# Patient Record
Sex: FEMALE | Race: BLACK OR AFRICAN AMERICAN | Employment: UNEMPLOYED | URBAN - METROPOLITAN AREA
[De-identification: names, ages, dates, MRNs, and addresses within clinical notes are randomized per-mention and may not be internally consistent; named-entity substitution may affect disease eponyms.]

---

## 2022-08-02 ENCOUNTER — TELEPHONE (OUTPATIENT)
Dept: ORTHOPEDICS | Facility: CLINIC | Age: 42
End: 2022-08-02
Payer: MEDICAID

## 2022-08-02 NOTE — TELEPHONE ENCOUNTER
Attempted to call pt x2 and each time a fax machine answered. Sent fax requesting call back with direct number. Fax attempt was abandoned. Unable to obtain records prior to visit with Dr Scott.

## 2022-08-03 ENCOUNTER — HOSPITAL ENCOUNTER (OUTPATIENT)
Dept: RADIOLOGY | Facility: HOSPITAL | Age: 42
Discharge: HOME | End: 2022-08-03
Attending: ORTHOPAEDIC SURGERY
Payer: MEDICAID

## 2022-08-03 ENCOUNTER — OFFICE VISIT (OUTPATIENT)
Dept: ORTHOPEDICS | Facility: CLINIC | Age: 42
End: 2022-08-03
Payer: MEDICAID

## 2022-08-03 VITALS — WEIGHT: 177.13 LBS | BODY MASS INDEX: 33.44 KG/M2 | HEIGHT: 61 IN

## 2022-08-03 DIAGNOSIS — M62.838 MUSCLE SPASMS OF BOTH LOWER EXTREMITIES: ICD-10-CM

## 2022-08-03 DIAGNOSIS — M25.572 BILATERAL ANKLE PAIN, UNSPECIFIED CHRONICITY: Primary | ICD-10-CM

## 2022-08-03 DIAGNOSIS — M25.571 BILATERAL ANKLE PAIN, UNSPECIFIED CHRONICITY: Primary | ICD-10-CM

## 2022-08-03 DIAGNOSIS — M79.672 BILATERAL FOOT PAIN: Primary | ICD-10-CM

## 2022-08-03 DIAGNOSIS — M25.571 BILATERAL ANKLE PAIN, UNSPECIFIED CHRONICITY: ICD-10-CM

## 2022-08-03 DIAGNOSIS — M79.672 BILATERAL FOOT PAIN: ICD-10-CM

## 2022-08-03 DIAGNOSIS — M79.671 BILATERAL FOOT PAIN: Primary | ICD-10-CM

## 2022-08-03 DIAGNOSIS — G57.93 NEUROPATHIC PAIN OF BOTH LEGS: ICD-10-CM

## 2022-08-03 DIAGNOSIS — M79.671 BILATERAL FOOT PAIN: ICD-10-CM

## 2022-08-03 DIAGNOSIS — M25.572 BILATERAL ANKLE PAIN, UNSPECIFIED CHRONICITY: ICD-10-CM

## 2022-08-03 PROCEDURE — 1160F RVW MEDS BY RX/DR IN RCRD: CPT | Mod: CPTII,,, | Performed by: ORTHOPAEDIC SURGERY

## 2022-08-03 PROCEDURE — 1160F PR REVIEW ALL MEDS BY PRESCRIBER/CLIN PHARMACIST DOCUMENTED: ICD-10-PCS | Mod: CPTII,,, | Performed by: ORTHOPAEDIC SURGERY

## 2022-08-03 PROCEDURE — 99999 PR PBB SHADOW E&M-EST. PATIENT-LVL III: ICD-10-PCS | Mod: PBBFAC,,, | Performed by: ORTHOPAEDIC SURGERY

## 2022-08-03 PROCEDURE — 73610 XR ANKLE COMPLETE 3 VIEW BILATERAL: ICD-10-PCS | Mod: 26,RT,, | Performed by: RADIOLOGY

## 2022-08-03 PROCEDURE — 73630 X-RAY EXAM OF FOOT: CPT | Mod: 26,LT,, | Performed by: RADIOLOGY

## 2022-08-03 PROCEDURE — 1159F PR MEDICATION LIST DOCUMENTED IN MEDICAL RECORD: ICD-10-PCS | Mod: CPTII,,, | Performed by: ORTHOPAEDIC SURGERY

## 2022-08-03 PROCEDURE — 3008F PR BODY MASS INDEX (BMI) DOCUMENTED: ICD-10-PCS | Mod: CPTII,,, | Performed by: ORTHOPAEDIC SURGERY

## 2022-08-03 PROCEDURE — 73630 X-RAY EXAM OF FOOT: CPT | Mod: TC,50

## 2022-08-03 PROCEDURE — 99203 OFFICE O/P NEW LOW 30 MIN: CPT | Mod: S$PBB,,, | Performed by: ORTHOPAEDIC SURGERY

## 2022-08-03 PROCEDURE — 99203 PR OFFICE/OUTPT VISIT, NEW, LEVL III, 30-44 MIN: ICD-10-PCS | Mod: S$PBB,,, | Performed by: ORTHOPAEDIC SURGERY

## 2022-08-03 PROCEDURE — 99213 OFFICE O/P EST LOW 20 MIN: CPT | Mod: PBBFAC | Performed by: ORTHOPAEDIC SURGERY

## 2022-08-03 PROCEDURE — 73610 X-RAY EXAM OF ANKLE: CPT | Mod: 26,RT,, | Performed by: RADIOLOGY

## 2022-08-03 PROCEDURE — 1159F MED LIST DOCD IN RCRD: CPT | Mod: CPTII,,, | Performed by: ORTHOPAEDIC SURGERY

## 2022-08-03 PROCEDURE — 3008F BODY MASS INDEX DOCD: CPT | Mod: CPTII,,, | Performed by: ORTHOPAEDIC SURGERY

## 2022-08-03 PROCEDURE — 73610 X-RAY EXAM OF ANKLE: CPT | Mod: TC,50

## 2022-08-03 PROCEDURE — 99999 PR PBB SHADOW E&M-EST. PATIENT-LVL III: CPT | Mod: PBBFAC,,, | Performed by: ORTHOPAEDIC SURGERY

## 2022-08-03 PROCEDURE — 73630 X-RAY EXAM OF FOOT: CPT | Mod: 26,RT,, | Performed by: RADIOLOGY

## 2022-08-03 PROCEDURE — 73610 X-RAY EXAM OF ANKLE: CPT | Mod: 26,LT,, | Performed by: RADIOLOGY

## 2022-08-03 PROCEDURE — 73630 PR  X-RAY FOOT 3+ VW: ICD-10-PCS | Mod: 26,LT,, | Performed by: RADIOLOGY

## 2022-08-03 NOTE — PROGRESS NOTES
"[facility]    SUBJECTIVE:     Chief Complaint: Bilateral foot pain     History of Present Illness:  Patient is a 42 y.o. female with a history of bilateral bunionectomies, who presents with bilateral foot pain and associated spasms.  Reports having these symptoms since she was around 20 y.o.  She describes pain that begins as pins/needles and progresses to tingling with burning pain.  The pain goes from the dorsum of her proximal foot up to her anterior knee.  Her pain is exacerbated by standing or walking long distances and has been progressively worsening over the past few years.  Associated with this pain are spasms that she describes as "locking up," which have also become more frequent during the past few years.  Also experiences similar spasms in her hands and arms bilaterally.  Certain movements will trigger these spasms, such as writing or stretching her feet at the end of a long workday, but they also sometimes occur randomly at night while she's sleeping.  Says her most recent episode was last weekend, and they occur sporadically with days or weeks in between episodes.  Reports also having lower back pain for years but believes it is due to multiple pregnancies and working on her feet all day.  She is able to ambulate unassisted, but feels her pain is limiting her ability to do daily tasks, such as cleaning her house, because she needs frequent breaks to alleviate her pain.  Denies taking NSAIDs or using inserts.         Review of patient's allergies indicates:  No Known Allergies    History reviewed. No pertinent past medical history.  History reviewed. No pertinent surgical history.  History reviewed. No pertinent family history.  Social History     Tobacco Use    Smoking status: Never Smoker    Smokeless tobacco: Never Used        Review of Systems:  Constitution: Negative for chills, fever  HENT: Negative for congestion  Cardiovascular: Negative for chest pain, " "palpitations  Respiratory: Negative for cough, shortness of breath  Hematologic/Lymphatic: Negative for easy bruising/bleeding  Skin: Negative for rash, suspicious lesions  Gastrointestinal: Negative for nausea, vomiting, bowel incontinence  Genitourinary: Negative for bladder incontinence  Neurological: Negative for numbness, paresthesias, sensory change  Musculoskeletal: see HPI      OBJECTIVE:     Vital Signs:  Ht 5' 1" (1.549 m)   Wt 80.3 kg (177 lb 2.2 oz)   BMI 33.47 kg/m²     Physical Exam:  General:  NAD, WDWN, Mood is pleasant  HENT:  NCAT, Bilateral ears/eyes normal  CV:  Normal pulses, color, and cap refill  Lungs:  Normal respiratory effort  Psych:  Alert and oriented x 3    MSK: Bilateral Foot and Ankle Exam:  Inspection: No swelling or ecchymosis present.  Standing examination demonstrates Valgus hindfoot/forefoot alignment. Medial longitudinal arch is pes planus, slightly pronated and symmetric.   Positive [id number]-[id number] sign symmetric.  Semi-flexible crossover toe present.   Palpation: No TTP in any specific area. Hindfoot is flexible.    ROM: Ankle ROM is normal; df15 deg, pf35 deg. and equal bilaterally   Neurovascular: Able to perform double limb heel rise. Unable to perform single limb heel rise.   4/5 PTT with pain. Otherwise fires TA/GSC/peroneals.  SILT SP/DP/PT and able to localize. Palpable DP and PT pulses.        XRAYS:  Standing 3v bilateral foot demonstrate    - Bilateral hallux valgus   - Bilateral pes planus   - No evidence of any fracture or dislocation.    - The osseous structures appear well mineralized and well aligned.   - No mortise displacement.    All other pertinent labs and imaging were reviewed.      ASSESSMENT:     Janay Mathis is a 42 y.o. old female with with a history of bilateral bunionectomies, who presents with bilateral foot pain and associated spasms.    1. Bilateral foot pain    2. Muscle spasms of both lower extremities    3. Neuropathic pain of both legs  "     .  PLAN:     - Seen and examined with Dr. Scott   Upper and lower extremity spasms concerning for Upper Motor Neuron pathology   Referring to neurology for continued evaluation   Follow up as needed       Signed:  SD Santana M.D.   Orthopaedic Surgery, PGY1    I have personally taken the history and examined this patient and agree with the residents note as stated above.  While this patient does have flat feet and recurrent bunions, I do not believe problems are the source of her symptoms.  I would recommend a Neurology, and or spine clinic evaluation to rule out other causes of her muscle spasms and other neuropathic symptoms

## 2022-08-08 ENCOUNTER — HOSPITAL ENCOUNTER (OUTPATIENT)
Facility: HOSPITAL | Age: 42
Discharge: LEFT BEFORE DISCHARGE | End: 2022-08-09
Attending: EMERGENCY MEDICINE | Admitting: EMERGENCY MEDICINE
Payer: MEDICAID

## 2022-08-08 DIAGNOSIS — D64.9 ANEMIA, UNSPECIFIED TYPE: Primary | ICD-10-CM

## 2022-08-08 DIAGNOSIS — R07.9 CHEST PAIN: ICD-10-CM

## 2022-08-08 PROBLEM — D50.9 MICROCYTIC ANEMIA: Status: ACTIVE | Noted: 2022-08-08

## 2022-08-08 PROBLEM — F31.9 BIPOLAR AFFECTIVE DISORDER, CURRENTLY ACTIVE: Status: ACTIVE | Noted: 2022-08-08

## 2022-08-08 PROBLEM — D62 ACUTE BLOOD LOSS ANEMIA: Status: ACTIVE | Noted: 2022-08-08

## 2022-08-08 LAB
ABO + RH BLD: NORMAL
ALBUMIN SERPL BCP-MCNC: 3.8 G/DL (ref 3.5–5.2)
ALP SERPL-CCNC: 85 U/L (ref 55–135)
ALT SERPL W/O P-5'-P-CCNC: 23 U/L (ref 10–44)
ANION GAP SERPL CALC-SCNC: 6 MMOL/L (ref 8–16)
AST SERPL-CCNC: 19 U/L (ref 10–40)
B-HCG UR QL: NEGATIVE
BASOPHILS # BLD AUTO: 0.02 K/UL (ref 0–0.2)
BASOPHILS NFR BLD: 0.3 % (ref 0–1.9)
BILIRUB SERPL-MCNC: 0.5 MG/DL (ref 0.1–1)
BLD GP AB SCN CELLS X3 SERPL QL: NORMAL
BLD PROD TYP BPU: NORMAL
BLOOD UNIT EXPIRATION DATE: NORMAL
BLOOD UNIT TYPE CODE: 5100
BLOOD UNIT TYPE: NORMAL
BUN SERPL-MCNC: 7 MG/DL (ref 6–20)
CALCIUM SERPL-MCNC: 8.7 MG/DL (ref 8.7–10.5)
CHLORIDE SERPL-SCNC: 106 MMOL/L (ref 95–110)
CO2 SERPL-SCNC: 25 MMOL/L (ref 23–29)
CODING SYSTEM: NORMAL
CREAT SERPL-MCNC: 0.7 MG/DL (ref 0.5–1.4)
CTP QC/QA: YES
DIFFERENTIAL METHOD: ABNORMAL
DISPENSE STATUS: NORMAL
EOSINOPHIL # BLD AUTO: 0 K/UL (ref 0–0.5)
EOSINOPHIL NFR BLD: 0.3 % (ref 0–8)
ERYTHROCYTE [DISTWIDTH] IN BLOOD BY AUTOMATED COUNT: 20.9 % (ref 11.5–14.5)
EST. GFR  (NO RACE VARIABLE): >60 ML/MIN/1.73 M^2
FERRITIN SERPL-MCNC: 5 NG/ML (ref 20–300)
GLUCOSE SERPL-MCNC: 87 MG/DL (ref 70–110)
HCT VFR BLD AUTO: 24.3 % (ref 37–48.5)
HGB BLD-MCNC: 6.2 G/DL (ref 12–16)
IMM GRANULOCYTES # BLD AUTO: 0.02 K/UL (ref 0–0.04)
IMM GRANULOCYTES NFR BLD AUTO: 0.3 % (ref 0–0.5)
INR PPP: 1 (ref 0.8–1.2)
IRON SERPL-MCNC: 11 UG/DL (ref 30–160)
LYMPHOCYTES # BLD AUTO: 1.6 K/UL (ref 1–4.8)
LYMPHOCYTES NFR BLD: 20.5 % (ref 18–48)
MCH RBC QN AUTO: 18 PG (ref 27–31)
MCHC RBC AUTO-ENTMCNC: 25.5 G/DL (ref 32–36)
MCV RBC AUTO: 70 FL (ref 82–98)
MONOCYTES # BLD AUTO: 0.6 K/UL (ref 0.3–1)
MONOCYTES NFR BLD: 8.1 % (ref 4–15)
NEUTROPHILS # BLD AUTO: 5.5 K/UL (ref 1.8–7.7)
NEUTROPHILS NFR BLD: 70.5 % (ref 38–73)
NRBC BLD-RTO: 0 /100 WBC
PLATELET # BLD AUTO: 260 K/UL (ref 150–450)
PMV BLD AUTO: 11 FL (ref 9.2–12.9)
POTASSIUM SERPL-SCNC: 3.6 MMOL/L (ref 3.5–5.1)
PROT SERPL-MCNC: 6.9 G/DL (ref 6–8.4)
PROTHROMBIN TIME: 10.4 SEC (ref 9–12.5)
RBC # BLD AUTO: 3.45 M/UL (ref 4–5.4)
SARS-COV-2 RDRP RESP QL NAA+PROBE: NEGATIVE
SATURATED IRON: 2 % (ref 20–50)
SODIUM SERPL-SCNC: 137 MMOL/L (ref 136–145)
TOTAL IRON BINDING CAPACITY: 558 UG/DL (ref 250–450)
TRANS ERYTHROCYTES VOL PATIENT: NORMAL ML
TRANSFERRIN SERPL-MCNC: 377 MG/DL (ref 200–375)
WBC # BLD AUTO: 7.79 K/UL (ref 3.9–12.7)

## 2022-08-08 PROCEDURE — 85610 PROTHROMBIN TIME: CPT | Performed by: STUDENT IN AN ORGANIZED HEALTH CARE EDUCATION/TRAINING PROGRAM

## 2022-08-08 PROCEDURE — P9021 RED BLOOD CELLS UNIT: HCPCS | Performed by: EMERGENCY MEDICINE

## 2022-08-08 PROCEDURE — 87389 HIV-1 AG W/HIV-1&-2 AB AG IA: CPT | Performed by: PHYSICIAN ASSISTANT

## 2022-08-08 PROCEDURE — 82728 ASSAY OF FERRITIN: CPT | Performed by: EMERGENCY MEDICINE

## 2022-08-08 PROCEDURE — 85025 COMPLETE CBC W/AUTO DIFF WBC: CPT | Performed by: EMERGENCY MEDICINE

## 2022-08-08 PROCEDURE — 99220 PR INITIAL OBSERVATION CARE,LEVL III: ICD-10-PCS | Mod: ,,, | Performed by: STUDENT IN AN ORGANIZED HEALTH CARE EDUCATION/TRAINING PROGRAM

## 2022-08-08 PROCEDURE — 25000242 PHARM REV CODE 250 ALT 637 W/ HCPCS: Performed by: STUDENT IN AN ORGANIZED HEALTH CARE EDUCATION/TRAINING PROGRAM

## 2022-08-08 PROCEDURE — 99284 EMERGENCY DEPT VISIT MOD MDM: CPT | Mod: ,,, | Performed by: EMERGENCY MEDICINE

## 2022-08-08 PROCEDURE — 36430 TRANSFUSION BLD/BLD COMPNT: CPT

## 2022-08-08 PROCEDURE — 99284 PR EMERGENCY DEPT VISIT,LEVEL IV: ICD-10-PCS | Mod: ,,, | Performed by: EMERGENCY MEDICINE

## 2022-08-08 PROCEDURE — 86920 COMPATIBILITY TEST SPIN: CPT | Performed by: EMERGENCY MEDICINE

## 2022-08-08 PROCEDURE — 99220 PR INITIAL OBSERVATION CARE,LEVL III: CPT | Mod: ,,, | Performed by: STUDENT IN AN ORGANIZED HEALTH CARE EDUCATION/TRAINING PROGRAM

## 2022-08-08 PROCEDURE — G0378 HOSPITAL OBSERVATION PER HR: HCPCS

## 2022-08-08 PROCEDURE — U0002 COVID-19 LAB TEST NON-CDC: HCPCS | Performed by: EMERGENCY MEDICINE

## 2022-08-08 PROCEDURE — 99285 EMERGENCY DEPT VISIT HI MDM: CPT | Mod: 25

## 2022-08-08 PROCEDURE — 80053 COMPREHEN METABOLIC PANEL: CPT | Performed by: EMERGENCY MEDICINE

## 2022-08-08 PROCEDURE — 86803 HEPATITIS C AB TEST: CPT | Performed by: PHYSICIAN ASSISTANT

## 2022-08-08 PROCEDURE — 81025 URINE PREGNANCY TEST: CPT | Performed by: EMERGENCY MEDICINE

## 2022-08-08 PROCEDURE — 84466 ASSAY OF TRANSFERRIN: CPT | Performed by: EMERGENCY MEDICINE

## 2022-08-08 PROCEDURE — 86850 RBC ANTIBODY SCREEN: CPT | Performed by: EMERGENCY MEDICINE

## 2022-08-08 RX ORDER — TALC
6 POWDER (GRAM) TOPICAL NIGHTLY PRN
Status: DISCONTINUED | OUTPATIENT
Start: 2022-08-08 | End: 2022-08-08

## 2022-08-08 RX ORDER — PRAZOSIN HYDROCHLORIDE 5 MG/1
5 CAPSULE ORAL NIGHTLY
Status: DISCONTINUED | OUTPATIENT
Start: 2022-08-08 | End: 2022-08-09 | Stop reason: HOSPADM

## 2022-08-08 RX ORDER — PROCHLORPERAZINE EDISYLATE 5 MG/ML
5 INJECTION INTRAMUSCULAR; INTRAVENOUS EVERY 6 HOURS PRN
Status: DISCONTINUED | OUTPATIENT
Start: 2022-08-08 | End: 2022-08-09 | Stop reason: HOSPADM

## 2022-08-08 RX ORDER — IBUPROFEN 200 MG
16 TABLET ORAL
Status: DISCONTINUED | OUTPATIENT
Start: 2022-08-08 | End: 2022-08-09 | Stop reason: HOSPADM

## 2022-08-08 RX ORDER — IPRATROPIUM BROMIDE AND ALBUTEROL SULFATE 2.5; .5 MG/3ML; MG/3ML
3 SOLUTION RESPIRATORY (INHALATION) EVERY 6 HOURS PRN
Status: DISCONTINUED | OUTPATIENT
Start: 2022-08-08 | End: 2022-08-09 | Stop reason: HOSPADM

## 2022-08-08 RX ORDER — ONDANSETRON 2 MG/ML
4 INJECTION INTRAMUSCULAR; INTRAVENOUS EVERY 8 HOURS PRN
Status: DISCONTINUED | OUTPATIENT
Start: 2022-08-08 | End: 2022-08-09 | Stop reason: HOSPADM

## 2022-08-08 RX ORDER — IBUPROFEN 200 MG
24 TABLET ORAL
Status: DISCONTINUED | OUTPATIENT
Start: 2022-08-08 | End: 2022-08-09 | Stop reason: HOSPADM

## 2022-08-08 RX ORDER — SODIUM CHLORIDE 0.9 % (FLUSH) 0.9 %
10 SYRINGE (ML) INJECTION
Status: DISCONTINUED | OUTPATIENT
Start: 2022-08-08 | End: 2022-08-09 | Stop reason: HOSPADM

## 2022-08-08 RX ORDER — FLUOXETINE 10 MG/1
10 CAPSULE ORAL DAILY
Status: DISCONTINUED | OUTPATIENT
Start: 2022-08-09 | End: 2022-08-09 | Stop reason: HOSPADM

## 2022-08-08 RX ORDER — SIMETHICONE 80 MG
1 TABLET,CHEWABLE ORAL 4 TIMES DAILY PRN
Status: DISCONTINUED | OUTPATIENT
Start: 2022-08-08 | End: 2022-08-09 | Stop reason: HOSPADM

## 2022-08-08 RX ORDER — AMOXICILLIN 250 MG
1 CAPSULE ORAL 2 TIMES DAILY
Status: DISCONTINUED | OUTPATIENT
Start: 2022-08-08 | End: 2022-08-09 | Stop reason: HOSPADM

## 2022-08-08 RX ORDER — MAG HYDROX/ALUMINUM HYD/SIMETH 200-200-20
30 SUSPENSION, ORAL (FINAL DOSE FORM) ORAL 4 TIMES DAILY PRN
Status: DISCONTINUED | OUTPATIENT
Start: 2022-08-08 | End: 2022-08-09 | Stop reason: HOSPADM

## 2022-08-08 RX ORDER — NALOXONE HCL 0.4 MG/ML
0.02 VIAL (ML) INJECTION
Status: DISCONTINUED | OUTPATIENT
Start: 2022-08-08 | End: 2022-08-09 | Stop reason: HOSPADM

## 2022-08-08 RX ORDER — HYDROCODONE BITARTRATE AND ACETAMINOPHEN 500; 5 MG/1; MG/1
TABLET ORAL
Status: DISCONTINUED | OUTPATIENT
Start: 2022-08-08 | End: 2022-08-09 | Stop reason: HOSPADM

## 2022-08-08 RX ORDER — SODIUM CHLORIDE 0.9 % (FLUSH) 0.9 %
10 SYRINGE (ML) INJECTION EVERY 8 HOURS PRN
Status: DISCONTINUED | OUTPATIENT
Start: 2022-08-08 | End: 2022-08-09 | Stop reason: HOSPADM

## 2022-08-08 RX ORDER — TALC
6 POWDER (GRAM) TOPICAL NIGHTLY PRN
Status: DISCONTINUED | OUTPATIENT
Start: 2022-08-08 | End: 2022-08-09 | Stop reason: HOSPADM

## 2022-08-08 RX ORDER — LANOLIN ALCOHOL/MO/W.PET/CERES
1 CREAM (GRAM) TOPICAL DAILY
Status: DISCONTINUED | OUTPATIENT
Start: 2022-08-09 | End: 2022-08-09 | Stop reason: HOSPADM

## 2022-08-08 RX ORDER — GLUCAGON 1 MG
1 KIT INJECTION
Status: DISCONTINUED | OUTPATIENT
Start: 2022-08-08 | End: 2022-08-09 | Stop reason: HOSPADM

## 2022-08-08 RX ORDER — ACETAMINOPHEN 325 MG/1
650 TABLET ORAL EVERY 8 HOURS PRN
Status: DISCONTINUED | OUTPATIENT
Start: 2022-08-08 | End: 2022-08-09 | Stop reason: HOSPADM

## 2022-08-08 RX ADMIN — PRAZOSIN HYDROCHLORIDE 5 MG: 5 CAPSULE ORAL at 11:08

## 2022-08-08 NOTE — ED PROVIDER NOTES
Encounter Date: 8/8/2022    SCRIBE #1 NOTE: I, Kelli Fernandez, am scribing for, and in the presence of,  Martha Sexton MD. I have scribed the following portions of the note - Other sections scribed: HPI, ROS, PE.       History     Chief Complaint   Patient presents with    Rectal Bleeding     Been having rectal bleeding but has increased     Time patient was seen by the provider: 3:52 PM      The patient is a 42 y.o. female with past medical history of rectal bleeding who presents to the ED with a complaint of rectal bleeding every day for years. She reports associated abdominal cramping and bloody diarrhea.    The history is provided by the patient and medical records. No  was used.     Review of patient's allergies indicates:  No Known Allergies  History reviewed. No pertinent past medical history.  History reviewed. No pertinent surgical history.  History reviewed. No pertinent family history.  Social History     Tobacco Use    Smoking status: Never Smoker    Smokeless tobacco: Never Used     Review of Systems   Gastrointestinal: Positive for abdominal pain, anal bleeding, blood in stool and diarrhea.       Physical Exam     Initial Vitals [08/08/22 1421]   BP Pulse Resp Temp SpO2   133/83 94 18 98.4 °F (36.9 °C) 100 %      MAP       --         Physical Exam    Nursing note and vitals reviewed.  Constitutional: Vital signs are normal. She appears well-developed and well-nourished.  Non-toxic appearance. She does not appear ill.   HENT:   Head: Normocephalic and atraumatic.   Mouth/Throat: Mucous membranes are normal. Mucous membranes are not dry.   Eyes: Conjunctivae and lids are normal.   No conjunctival pallor   Neck: Neck supple.   Normal range of motion.  Cardiovascular: Normal rate.   Abdominal: Abdomen is soft. There is no abdominal tenderness.   Genitourinary: Rectum:      Guaiac result negative.   Guaiac negative stool.    Genitourinary Comments: External non-bleeding  hemorrhoids      Musculoskeletal:      Cervical back: Normal range of motion and neck supple.     Neurological: She is alert and oriented to person, place, and time.   Skin: Skin is dry and intact. No pallor.   Psychiatric: She has a normal mood and affect. Her speech is normal and behavior is normal.         ED Course   Procedures  Labs Reviewed   CBC W/ AUTO DIFFERENTIAL - Abnormal; Notable for the following components:       Result Value    RBC 3.45 (*)     Hemoglobin 6.2 (*)     Hematocrit 24.3 (*)     MCV 70 (*)     MCH 18.0 (*)     MCHC 25.5 (*)     RDW 20.9 (*)     All other components within normal limits   COMPREHENSIVE METABOLIC PANEL - Abnormal; Notable for the following components:    Anion Gap 6 (*)     All other components within normal limits   FERRITIN - Abnormal; Notable for the following components:    Ferritin 5 (*)     All other components within normal limits    Narrative:     ADD ON IRONP SAMUEL ORD#[id number] PER MD BUCKLEY 08/08/22 @1726   IRON AND TIBC - Abnormal; Notable for the following components:    Iron 11 (*)     Transferrin 377 (*)     TIBC 558 (*)     Saturated Iron 2 (*)     All other components within normal limits    Narrative:     ADD ON IRONP SAMUEL ORD#[id number] PER MD BUCKLEY 08/08/22 @1726   SARS-COV-2 RNA AMPLIFICATION, QUAL   IRON AND TIBC   FERRITIN   PROTIME-INR   HIV 1 / 2 ANTIBODY   HEPATITIS C ANTIBODY   POCT URINE PREGNANCY   TYPE & SCREEN   PREPARE RBC SOFT          Imaging Results    None          Medications - No data to display  Medical Decision Making:   History:   Old Medical Records: I decided to obtain old medical records.  Initial Assessment:   Complicated past medical history, on on review of patient's EMR from [facility] it seems that she has recurrent admissions therefore severe anemia in the setting of heavy vaginal bleeding.  She has also noted hematochezia in that setting as well though it seems that OB and Medicine all feel that her severe anemia is secondary to  dysfunctional uterine bleeding.    On her exam today her vital signs are stable, she is not appear pale and her abdominal exam is benign and her rectal exam is negative for blood and guaiac negative.  Differential Diagnosis:   Possible lower GI bleed?  If present, patient reports that these symptoms have been present for years which would raise concern for something like inflammatory bowel disease or hemorrhoids.    General pelvic bleeding that is vaginal/uterine in origin?  Clinical Tests:   Lab Tests: Ordered and Reviewed  ED Management:  Will check labs given patient's history of severe anemia  If lab stable, patient is stable and could be further worked up for these symptoms and lower GI bleed as an outpatient with GI          Scribe Attestation:   Scribe #1: I performed the above scribed service and the documentation accurately describes the services I performed. I attest to the accuracy of the note.               I, Dr. Martha Sexton, personally performed the services described in this documentation. All medical record entries made by the scribe were at my direction and in my presence.  I have reviewed the chart and agree that the record reflects my personal performance and is accurate and complete. Martha Sexton MD.  4:15 PM 08/09/2022      Clinical Impression:   Final diagnoses:  [D64.9] Anemia, unspecified type (Primary)          ED Disposition Condition    JOHN Sexton MD  08/09/22 8003

## 2022-08-08 NOTE — ASSESSMENT & PLAN NOTE
Reports home meds are Prozac 10mg, Prazosin 5mg nightly, Ambien 10mg PRN. Was also recently prescribed Divalproex 750mg BID and risperidone 2mg but she states that she has no intentions of taking them  - Will restart prozac 10mg and prazosin 5mg nightly here

## 2022-08-08 NOTE — ED NOTES
Called at home upset states been coming to hosp for 2 yrs for same thing , keep loosing blood , I click out and they put in psych hosp, states returing

## 2022-08-08 NOTE — H&P
"[facility] - Emergency Dept  [facility]  History & Physical    Patient Name: Janay Mathis  MRN: [id number]  Patient Class: OP- Observation  Admission Date: 8/8/2022  Attending Physician: Pam Barrios MD  Primary Care Provider: Rahul Del Rio MD         Patient information was obtained from patient and ER records.     Subjective:     Principal Problem:<principal problem not specified>    Chief Complaint:   Chief Complaint   Patient presents with    Rectal Bleeding     Been having rectal bleeding but has increased        HPI: Ms. Mathis is a 41 y/o F with a PMH of anemia 2/2 menorrhagia, bipolar disorder, and hypertension who presents with complaints of rectal bleeding.     Though having a history of heavy mensis, patient lately has been complaining of a diarrhea-like feeling associated with bright red blood per rectum. She was recently hospitalized at [facility] around 4/29 with hemoglobin of 4.9, presumably secondary to uterine bleeding. She received 2u PRBC and IV iron and left AMA at that time since she felt hey were disregarding her concerns of a GIB. Since then she reports continuing to have blood occasionally gushing from her. She states that she will feel a rectal fullness, then run to the bathroom to have a BM and "its just blood" per her words. Denies any history of melena or hematemesis. Denies family history of IBD.     Received US of her uterus 12/21 and it was normal.       History reviewed. No pertinent past medical history.    History reviewed. No pertinent surgical history.    Review of patient's allergies indicates:  No Known Allergies    No current facility-administered medications on file prior to encounter.     No current outpatient medications on file prior to encounter.     Family History    None       Tobacco Use    Smoking status: Never Smoker    Smokeless tobacco: Never Used   Substance and Sexual Activity    Alcohol use: Not on file    Drug use: Not on file    Sexual activity: " Not on file     Review of Systems   Constitutional:  Positive for fatigue. Negative for activity change, appetite change, chills, diaphoresis, fever and unexpected weight change.   HENT:  Negative for congestion, dental problem, nosebleeds, rhinorrhea, sneezing and sore throat.    Eyes:  Negative for photophobia and visual disturbance.   Respiratory:  Negative for cough, chest tightness, shortness of breath, wheezing and stridor.    Cardiovascular:  Negative for chest pain, palpitations and leg swelling.   Gastrointestinal:  Positive for anal bleeding, blood in stool and diarrhea. Negative for abdominal distention, abdominal pain, constipation, nausea, rectal pain and vomiting.   Endocrine: Negative for cold intolerance, heat intolerance, polydipsia and polyuria.   Genitourinary:  Negative for dysuria, frequency, genital sores, hematuria, menstrual problem, pelvic pain, urgency, vaginal bleeding and vaginal discharge.   Musculoskeletal:  Negative for arthralgias, joint swelling, myalgias and neck stiffness.   Skin:  Negative for rash and wound.   Allergic/Immunologic: Negative for environmental allergies, food allergies and immunocompromised state.   Neurological:  Negative for dizziness, tremors, seizures, syncope, speech difficulty, weakness, light-headedness, numbness and headaches.   Hematological:  Negative for adenopathy. Does not bruise/bleed easily.   Psychiatric/Behavioral:  Negative for agitation, confusion, decreased concentration, dysphoric mood, hallucinations, sleep disturbance and suicidal ideas. The patient is not nervous/anxious.    Objective:     Vital Signs (Most Recent):  Temp: 98.4 °F (36.9 °C) (08/08/22 1421)  Pulse: 94 (08/08/22 1421)  Resp: 18 (08/08/22 1421)  BP: 133/83 (08/08/22 1421)  SpO2: 100 % (08/08/22 1421) Vital Signs (24h Range):  Temp:  [98.4 °F (36.9 °C)] 98.4 °F (36.9 °C)  Pulse:  [94] 94  Resp:  [18] 18  SpO2:  [100 %] 100 %  BP: (133)/(83) 133/83     Weight: 80.3 kg (177  lb)  Body mass index is 33.44 kg/m².    Physical Exam  Constitutional:       Appearance: She is well-developed.   HENT:      Head: Normocephalic and atraumatic.      Nose: Nose normal.   Eyes:      General: No scleral icterus.        Right eye: No discharge.         Left eye: No discharge.      Conjunctiva/sclera: Conjunctivae normal.      Pupils: Pupils are equal, round, and reactive to light.   Neck:      Thyroid: No thyromegaly.      Vascular: No JVD.      Trachea: No tracheal deviation.   Cardiovascular:      Rate and Rhythm: Normal rate and regular rhythm.      Heart sounds: Normal heart sounds. No murmur heard.    No friction rub. No gallop.   Pulmonary:      Effort: Pulmonary effort is normal. No respiratory distress.      Breath sounds: Normal breath sounds. No stridor. No wheezing or rales.   Abdominal:      General: Bowel sounds are normal. There is no distension.      Palpations: Abdomen is soft. There is no mass.      Tenderness: There is no abdominal tenderness. There is no guarding or rebound.      Hernia: No hernia is present.   Musculoskeletal:         General: No tenderness or deformity. Normal range of motion.      Cervical back: Neck supple.   Lymphadenopathy:      Cervical: No cervical adenopathy.   Skin:     General: Skin is warm and dry.      Findings: No erythema or rash.   Neurological:      Mental Status: She is alert and oriented to person, place, and time.      Cranial Nerves: No cranial nerve deficit.      Motor: No abnormal muscle tone.      Coordination: Coordination normal.      Deep Tendon Reflexes: Reflexes normal.   Psychiatric:         Behavior: Behavior normal.         Thought Content: Thought content normal.         Judgment: Judgment normal.         CRANIAL NERVES     CN III, IV, VI   Pupils are equal, round, and reactive to light.     Significant Labs: All pertinent labs within the past 24 hours have been reviewed.  CBC:   Recent Labs   Lab 08/08/22  1605   WBC 7.79   HGB 6.2*    HCT 24.3*        CMP:   Recent Labs   Lab 08/08/22  1605      K 3.6      CO2 25   GLU 87   BUN 7   CREATININE 0.7   CALCIUM 8.7   PROT 6.9   ALBUMIN 3.8   BILITOT 0.5   ALKPHOS 85   AST 19   ALT 23   ANIONGAP 6*       Significant Imaging: I have reviewed all pertinent imaging results/findings within the past 24 hours.    Assessment/Plan:     Microcytic anemia  Start iron supplementation this inpatient stay.     Bipolar affective disorder, currently active  Reports home meds are Prozac 10mg, Prazosin 5mg nightly, Ambien 10mg PRN. Was also recently prescribed Divalproex 750mg BID and risperidone 2mg but she states that she has no intentions of taking them  - Will restart prozac 10mg and prazosin 5mg nightly here        Acute blood loss anemia  Likely secondary to hemorrhoids, diverticulosis vs uterine bleeding. Patient's history is fairly confusing and she remains adamant that she is not having any uterine/vaginal bleeding at this time. Transfusion ordered in ED.  - Will consult GI for possible flex/sig. Make patient NPO @ [facility]  - Check iron/tibc/ferritin  - f/u hgb after transfusion        VTE Risk Mitigation (From admission, onward)         Ordered     IP VTE HIGH RISK PATIENT  Once         08/08/22 1702     Place sequential compression device  Until discontinued         08/08/22 1702     Place sequential compression device  Until discontinued         08/08/22 1702     Reason for No Pharmacological VTE Prophylaxis  Once        Question:  Reasons:  Answer:  Risk of Bleeding    08/08/22 1702                   Pam Barrios MD  Department of Hospital Medicine   [facility] - Emergency Dept

## 2022-08-08 NOTE — Clinical Note
Diagnosis: Anemia, unspecified type [[id number]]   Future Attending Provider: ANDREA BUCKLEY [[id number]]   Admitting Provider:: SAGAR CHIANG [[id number]]

## 2022-08-08 NOTE — ASSESSMENT & PLAN NOTE
Likely secondary to hemorrhoids, diverticulosis vs uterine bleeding. Patient's history is fairly confusing and she remains adamant that she is not having any uterine/vaginal bleeding at this time. Transfusion ordered in ED.  - Will consult GI for possible flex/sig. Make patient NPO @ MN  - Check iron/tibc/ferritin  - f/u hgb after transfusion

## 2022-08-08 NOTE — ED TRIAGE NOTES
"Janay Mathis, a 42 y.o. female presents to the ED w/ complaint of rectal bleeding. Pt states bleeding has been occurring for a couple months, has gradually gotten worse. States that when she goes to have a bowel movement, feels like she is having diarrhea but only "bright red blood is coming out." Hx of rectal bleeding, has required blood transfusions in the past.     Triage note:  Chief Complaint   Patient presents with    Rectal Bleeding     Been having rectal bleeding but has increased     Review of patient's allergies indicates:  No Known Allergies  No past medical history on file.    "

## 2022-08-08 NOTE — SUBJECTIVE & OBJECTIVE
History reviewed. No pertinent past medical history.    History reviewed. No pertinent surgical history.    Review of patient's allergies indicates:  No Known Allergies    No current facility-administered medications on file prior to encounter.     No current outpatient medications on file prior to encounter.     Family History    None       Tobacco Use    Smoking status: Never Smoker    Smokeless tobacco: Never Used   Substance and Sexual Activity    Alcohol use: Not on file    Drug use: Not on file    Sexual activity: Not on file     Review of Systems   Constitutional:  Positive for fatigue. Negative for activity change, appetite change, chills, diaphoresis, fever and unexpected weight change.   HENT:  Negative for congestion, dental problem, nosebleeds, rhinorrhea, sneezing and sore throat.    Eyes:  Negative for photophobia and visual disturbance.   Respiratory:  Negative for cough, chest tightness, shortness of breath, wheezing and stridor.    Cardiovascular:  Negative for chest pain, palpitations and leg swelling.   Gastrointestinal:  Positive for anal bleeding, blood in stool and diarrhea. Negative for abdominal distention, abdominal pain, constipation, nausea, rectal pain and vomiting.   Endocrine: Negative for cold intolerance, heat intolerance, polydipsia and polyuria.   Genitourinary:  Negative for dysuria, frequency, genital sores, hematuria, menstrual problem, pelvic pain, urgency, vaginal bleeding and vaginal discharge.   Musculoskeletal:  Negative for arthralgias, joint swelling, myalgias and neck stiffness.   Skin:  Negative for rash and wound.   Allergic/Immunologic: Negative for environmental allergies, food allergies and immunocompromised state.   Neurological:  Negative for dizziness, tremors, seizures, syncope, speech difficulty, weakness, light-headedness, numbness and headaches.   Hematological:  Negative for adenopathy. Does not bruise/bleed easily.   Psychiatric/Behavioral:  Negative for  agitation, confusion, decreased concentration, dysphoric mood, hallucinations, sleep disturbance and suicidal ideas. The patient is not nervous/anxious.    Objective:     Vital Signs (Most Recent):  Temp: 98.4 °F (36.9 °C) (08/08/22 1421)  Pulse: 94 (08/08/22 1421)  Resp: 18 (08/08/22 1421)  BP: 133/83 (08/08/22 1421)  SpO2: 100 % (08/08/22 1421) Vital Signs (24h Range):  Temp:  [98.4 °F (36.9 °C)] 98.4 °F (36.9 °C)  Pulse:  [94] 94  Resp:  [18] 18  SpO2:  [100 %] 100 %  BP: (133)/(83) 133/83     Weight: 80.3 kg (177 lb)  Body mass index is 33.44 kg/m².    Physical Exam  Constitutional:       Appearance: She is well-developed.   HENT:      Head: Normocephalic and atraumatic.      Nose: Nose normal.   Eyes:      General: No scleral icterus.        Right eye: No discharge.         Left eye: No discharge.      Conjunctiva/sclera: Conjunctivae normal.      Pupils: Pupils are equal, round, and reactive to light.   Neck:      Thyroid: No thyromegaly.      Vascular: No JVD.      Trachea: No tracheal deviation.   Cardiovascular:      Rate and Rhythm: Normal rate and regular rhythm.      Heart sounds: Normal heart sounds. No murmur heard.    No friction rub. No gallop.   Pulmonary:      Effort: Pulmonary effort is normal. No respiratory distress.      Breath sounds: Normal breath sounds. No stridor. No wheezing or rales.   Abdominal:      General: Bowel sounds are normal. There is no distension.      Palpations: Abdomen is soft. There is no mass.      Tenderness: There is no abdominal tenderness. There is no guarding or rebound.      Hernia: No hernia is present.   Musculoskeletal:         General: No tenderness or deformity. Normal range of motion.      Cervical back: Neck supple.   Lymphadenopathy:      Cervical: No cervical adenopathy.   Skin:     General: Skin is warm and dry.      Findings: No erythema or rash.   Neurological:      Mental Status: She is alert and oriented to person, place, and time.      Cranial Nerves:  No cranial nerve deficit.      Motor: No abnormal muscle tone.      Coordination: Coordination normal.      Deep Tendon Reflexes: Reflexes normal.   Psychiatric:         Behavior: Behavior normal.         Thought Content: Thought content normal.         Judgment: Judgment normal.         CRANIAL NERVES     CN III, IV, VI   Pupils are equal, round, and reactive to light.     Significant Labs: All pertinent labs within the past 24 hours have been reviewed.  CBC:   Recent Labs   Lab 08/08/22  1605   WBC 7.79   HGB 6.2*   HCT 24.3*        CMP:   Recent Labs   Lab 08/08/22  1605      K 3.6      CO2 25   GLU 87   BUN 7   CREATININE 0.7   CALCIUM 8.7   PROT 6.9   ALBUMIN 3.8   BILITOT 0.5   ALKPHOS 85   AST 19   ALT 23   ANIONGAP 6*       Significant Imaging: I have reviewed all pertinent imaging results/findings within the past 24 hours.   No

## 2022-08-08 NOTE — HPI
"Ms. Mathis is a 43 y/o F with a PMH of anemia 2/2 menorrhagia, bipolar disorder, and hypertension who presents with complaints of rectal bleeding.     Though having a history of heavy mensis, patient lately has been complaining of a diarrhea-like feeling associated with bright red blood per rectum. She was recently hospitalized at [facility] around 4/29 with hemoglobin of 4.9, presumably secondary to uterine bleeding. She received 2u PRBC and IV iron and left AMA at that time since she felt hey were disregarding her concerns of a GIB. Since then she reports continuing to have blood occasionally gushing from her. She states that she will feel a rectal fullness, then run to the bathroom to have a BM and "its just blood" per her words. Denies any history of melena or hematemesis. Denies family history of IBD.     Received US of her uterus 12/21 and it was normal.   "

## 2022-08-09 VITALS
HEART RATE: 66 BPM | DIASTOLIC BLOOD PRESSURE: 69 MMHG | WEIGHT: 177 LBS | OXYGEN SATURATION: 100 % | BODY MASS INDEX: 33.42 KG/M2 | RESPIRATION RATE: 18 BRPM | TEMPERATURE: 99 F | SYSTOLIC BLOOD PRESSURE: 135 MMHG | HEIGHT: 61 IN

## 2022-08-09 LAB
HCV AB SERPL QL IA: NEGATIVE
HIV 1+2 AB+HIV1 P24 AG SERPL QL IA: NEGATIVE

## 2022-08-09 PROCEDURE — G0378 HOSPITAL OBSERVATION PER HR: HCPCS

## 2022-08-09 NOTE — ED NOTES
"Patient ambulated to nurses station and stated "Get these IVs out, I'm ready to go." Pt advised of admission and MD hope to keep patient over night for observation. Pt states "I don't care, I'm leaving." AMA form printed, Med X paged.    Janay Mathis was triaged, educated on current status, & monitored. Dr. Perez notifed and report given. MD ordered for extended observation. POC discussed with Janay Mathis. After discussion, she verbalized understanding of POC, but now expresses desire to leave. Instructed her that if she leaves it would be ill advised and against medical advice. Janay Mathis verbalized understanding of these instructions.    The [facility] Refusal of Advised Medical Care/ Advice form was explained, discussed, & completed. Risks/ Complications of leaving the facility at this time were expressed & explained. Janay Mathis verbalized understanding of these instructions.    The patient signed the release portion of the form. She was instructed to return to [facility] if she were to change her mind or need further medical care.     Janay Mathis refused further care.  Condition when leaving: Stable    She left via ambulation at  08/09/2022 1:30 AM    Amilcar Hallman RN 8/9/2022     "

## 2023-03-21 ENCOUNTER — HOSPITAL ENCOUNTER (OUTPATIENT)
Facility: HOSPITAL | Age: 43
Discharge: LEFT BEFORE DISCHARGE | End: 2023-03-22
Attending: EMERGENCY MEDICINE | Admitting: EMERGENCY MEDICINE
Payer: MEDICAID

## 2023-03-21 DIAGNOSIS — R06.02 SOB (SHORTNESS OF BREATH): ICD-10-CM

## 2023-03-21 DIAGNOSIS — R07.9 CHEST PAIN: ICD-10-CM

## 2023-03-21 DIAGNOSIS — D64.9 SYMPTOMATIC ANEMIA: Primary | ICD-10-CM

## 2023-03-21 LAB
ABO + RH BLD: NORMAL
ALBUMIN SERPL BCP-MCNC: 3.7 G/DL (ref 3.5–5.2)
ALP SERPL-CCNC: 73 U/L (ref 55–135)
ALT SERPL W/O P-5'-P-CCNC: 13 U/L (ref 10–44)
ANION GAP SERPL CALC-SCNC: 6 MMOL/L (ref 8–16)
ANISOCYTOSIS BLD QL SMEAR: ABNORMAL
AST SERPL-CCNC: 14 U/L (ref 10–40)
B-HCG UR QL: NEGATIVE
BACTERIA #/AREA URNS AUTO: ABNORMAL /HPF
BASOPHILS # BLD AUTO: 0.01 K/UL (ref 0–0.2)
BASOPHILS # BLD AUTO: 0.02 K/UL (ref 0–0.2)
BASOPHILS NFR BLD: 0.2 % (ref 0–1.9)
BASOPHILS NFR BLD: 0.2 % (ref 0–1.9)
BILIRUB SERPL-MCNC: 0.5 MG/DL (ref 0.1–1)
BILIRUB UR QL STRIP: NEGATIVE
BLD GP AB SCN CELLS X3 SERPL QL: NORMAL
BLD PROD TYP BPU: NORMAL
BLD PROD TYP BPU: NORMAL
BLOOD UNIT EXPIRATION DATE: NORMAL
BLOOD UNIT EXPIRATION DATE: NORMAL
BLOOD UNIT TYPE CODE: 5100
BLOOD UNIT TYPE CODE: 5100
BLOOD UNIT TYPE: NORMAL
BLOOD UNIT TYPE: NORMAL
BUN SERPL-MCNC: 8 MG/DL (ref 6–20)
CALCIUM SERPL-MCNC: 8.9 MG/DL (ref 8.7–10.5)
CHLORIDE SERPL-SCNC: 108 MMOL/L (ref 95–110)
CLARITY UR REFRACT.AUTO: ABNORMAL
CO2 SERPL-SCNC: 24 MMOL/L (ref 23–29)
CODING SYSTEM: NORMAL
CODING SYSTEM: NORMAL
COLOR UR AUTO: YELLOW
CREAT SERPL-MCNC: 0.8 MG/DL (ref 0.5–1.4)
CROSSMATCH INTERPRETATION: NORMAL
CROSSMATCH INTERPRETATION: NORMAL
CTP QC/QA: YES
DACRYOCYTES BLD QL SMEAR: ABNORMAL
DIFFERENTIAL METHOD: ABNORMAL
DIFFERENTIAL METHOD: ABNORMAL
DISPENSE STATUS: NORMAL
DISPENSE STATUS: NORMAL
EOSINOPHIL # BLD AUTO: 0 K/UL (ref 0–0.5)
EOSINOPHIL # BLD AUTO: 0.1 K/UL (ref 0–0.5)
EOSINOPHIL NFR BLD: 0.5 % (ref 0–8)
EOSINOPHIL NFR BLD: 0.6 % (ref 0–8)
ERYTHROCYTE [DISTWIDTH] IN BLOOD BY AUTOMATED COUNT: 23.8 % (ref 11.5–14.5)
ERYTHROCYTE [DISTWIDTH] IN BLOOD BY AUTOMATED COUNT: 27.2 % (ref 11.5–14.5)
EST. GFR  (NO RACE VARIABLE): >60 ML/MIN/1.73 M^2
GLUCOSE SERPL-MCNC: 99 MG/DL (ref 70–110)
GLUCOSE UR QL STRIP: NEGATIVE
HCT VFR BLD AUTO: 18.7 % (ref 37–48.5)
HCT VFR BLD AUTO: 27.2 % (ref 37–48.5)
HGB BLD-MCNC: 4.7 G/DL (ref 12–16)
HGB BLD-MCNC: 7.7 G/DL (ref 12–16)
HGB UR QL STRIP: ABNORMAL
HYPOCHROMIA BLD QL SMEAR: ABNORMAL
IMM GRANULOCYTES # BLD AUTO: 0.01 K/UL (ref 0–0.04)
IMM GRANULOCYTES # BLD AUTO: 0.02 K/UL (ref 0–0.04)
IMM GRANULOCYTES NFR BLD AUTO: 0.2 % (ref 0–0.5)
IMM GRANULOCYTES NFR BLD AUTO: 0.2 % (ref 0–0.5)
INR PPP: 1 (ref 0.8–1.2)
KETONES UR QL STRIP: NEGATIVE
LEUKOCYTE ESTERASE UR QL STRIP: ABNORMAL
LYMPHOCYTES # BLD AUTO: 1.2 K/UL (ref 1–4.8)
LYMPHOCYTES # BLD AUTO: 1.3 K/UL (ref 1–4.8)
LYMPHOCYTES NFR BLD: 11.9 % (ref 18–48)
LYMPHOCYTES NFR BLD: 21.1 % (ref 18–48)
MCH RBC QN AUTO: 16.7 PG (ref 27–31)
MCH RBC QN AUTO: 20.3 PG (ref 27–31)
MCHC RBC AUTO-ENTMCNC: 25.1 G/DL (ref 32–36)
MCHC RBC AUTO-ENTMCNC: 28.3 G/DL (ref 32–36)
MCV RBC AUTO: 66 FL (ref 82–98)
MCV RBC AUTO: 72 FL (ref 82–98)
MICROSCOPIC COMMENT: ABNORMAL
MONOCYTES # BLD AUTO: 0.5 K/UL (ref 0.3–1)
MONOCYTES # BLD AUTO: 0.6 K/UL (ref 0.3–1)
MONOCYTES NFR BLD: 5.5 % (ref 4–15)
MONOCYTES NFR BLD: 8.3 % (ref 4–15)
NEUTROPHILS # BLD AUTO: 3.9 K/UL (ref 1.8–7.7)
NEUTROPHILS # BLD AUTO: 8.7 K/UL (ref 1.8–7.7)
NEUTROPHILS NFR BLD: 69.7 % (ref 38–73)
NEUTROPHILS NFR BLD: 81.6 % (ref 38–73)
NITRITE UR QL STRIP: NEGATIVE
NRBC BLD-RTO: 0 /100 WBC
NRBC BLD-RTO: 0 /100 WBC
OVALOCYTES BLD QL SMEAR: ABNORMAL
PH UR STRIP: 6 [PH] (ref 5–8)
PLATELET # BLD AUTO: 379 K/UL (ref 150–450)
PLATELET # BLD AUTO: 399 K/UL (ref 150–450)
PLATELET BLD QL SMEAR: ABNORMAL
PMV BLD AUTO: 9.4 FL (ref 9.2–12.9)
PMV BLD AUTO: 9.4 FL (ref 9.2–12.9)
POCT GLUCOSE: 127 MG/DL (ref 70–110)
POCT GLUCOSE: 85 MG/DL (ref 70–110)
POIKILOCYTOSIS BLD QL SMEAR: SLIGHT
POTASSIUM SERPL-SCNC: 4 MMOL/L (ref 3.5–5.1)
PROT SERPL-MCNC: 6.8 G/DL (ref 6–8.4)
PROT UR QL STRIP: NEGATIVE
PROTHROMBIN TIME: 10.7 SEC (ref 9–12.5)
RBC # BLD AUTO: 2.82 M/UL (ref 4–5.4)
RBC # BLD AUTO: 3.79 M/UL (ref 4–5.4)
RBC #/AREA URNS AUTO: 9 /HPF (ref 0–4)
SCHISTOCYTES BLD QL SMEAR: ABNORMAL
SCHISTOCYTES BLD QL SMEAR: PRESENT
SODIUM SERPL-SCNC: 138 MMOL/L (ref 136–145)
SP GR UR STRIP: 1.02 (ref 1–1.03)
SPECIMEN OUTDATE: NORMAL
SPHEROCYTES BLD QL SMEAR: ABNORMAL
SQUAMOUS #/AREA URNS AUTO: 52 /HPF
TRANS ERYTHROCYTES VOL PATIENT: NORMAL ML
TRANS ERYTHROCYTES VOL PATIENT: NORMAL ML
URN SPEC COLLECT METH UR: ABNORMAL
WBC # BLD AUTO: 10.67 K/UL (ref 3.9–12.7)
WBC # BLD AUTO: 5.65 K/UL (ref 3.9–12.7)
WBC #/AREA URNS AUTO: 23 /HPF (ref 0–5)

## 2023-03-21 PROCEDURE — 36430 TRANSFUSION BLD/BLD COMPNT: CPT

## 2023-03-21 PROCEDURE — 93010 ELECTROCARDIOGRAM REPORT: CPT | Mod: ,,, | Performed by: INTERNAL MEDICINE

## 2023-03-21 PROCEDURE — 86920 COMPATIBILITY TEST SPIN: CPT

## 2023-03-21 PROCEDURE — 99223 1ST HOSP IP/OBS HIGH 75: CPT | Mod: ,,,

## 2023-03-21 PROCEDURE — 99285 EMERGENCY DEPT VISIT HI MDM: CPT

## 2023-03-21 PROCEDURE — P9021 RED BLOOD CELLS UNIT: HCPCS | Performed by: EMERGENCY MEDICINE

## 2023-03-21 PROCEDURE — 99291 CRITICAL CARE FIRST HOUR: CPT | Mod: ,,, | Performed by: EMERGENCY MEDICINE

## 2023-03-21 PROCEDURE — 25000003 PHARM REV CODE 250

## 2023-03-21 PROCEDURE — 25000003 PHARM REV CODE 250: Performed by: NURSE PRACTITIONER

## 2023-03-21 PROCEDURE — G0378 HOSPITAL OBSERVATION PER HR: HCPCS

## 2023-03-21 PROCEDURE — 25000242 PHARM REV CODE 250 ALT 637 W/ HCPCS

## 2023-03-21 PROCEDURE — 99223 PR INITIAL HOSPITAL CARE,LEVL III: ICD-10-PCS | Mod: ,,,

## 2023-03-21 PROCEDURE — 86920 COMPATIBILITY TEST SPIN: CPT | Performed by: EMERGENCY MEDICINE

## 2023-03-21 PROCEDURE — 99291 PR CRITICAL CARE, E/M 30-74 MINUTES: ICD-10-PCS | Mod: ,,, | Performed by: EMERGENCY MEDICINE

## 2023-03-21 PROCEDURE — 85610 PROTHROMBIN TIME: CPT

## 2023-03-21 PROCEDURE — 86900 BLOOD TYPING SEROLOGIC ABO: CPT | Performed by: NURSE PRACTITIONER

## 2023-03-21 PROCEDURE — 94761 N-INVAS EAR/PLS OXIMETRY MLT: CPT

## 2023-03-21 PROCEDURE — 93005 ELECTROCARDIOGRAM TRACING: CPT

## 2023-03-21 PROCEDURE — 96374 THER/PROPH/DIAG INJ IV PUSH: CPT

## 2023-03-21 PROCEDURE — 85025 COMPLETE CBC W/AUTO DIFF WBC: CPT | Performed by: NURSE PRACTITIONER

## 2023-03-21 PROCEDURE — 81001 URINALYSIS AUTO W/SCOPE: CPT | Performed by: HOSPITALIST

## 2023-03-21 PROCEDURE — 80053 COMPREHEN METABOLIC PANEL: CPT | Performed by: NURSE PRACTITIONER

## 2023-03-21 PROCEDURE — 81025 URINE PREGNANCY TEST: CPT | Performed by: NURSE PRACTITIONER

## 2023-03-21 PROCEDURE — 82962 GLUCOSE BLOOD TEST: CPT

## 2023-03-21 PROCEDURE — 85025 COMPLETE CBC W/AUTO DIFF WBC: CPT | Mod: 91

## 2023-03-21 PROCEDURE — 93010 EKG 12-LEAD: ICD-10-PCS | Mod: ,,, | Performed by: INTERNAL MEDICINE

## 2023-03-21 RX ORDER — PRAZOSIN HYDROCHLORIDE 5 MG/1
5 CAPSULE ORAL NIGHTLY
COMMUNITY

## 2023-03-21 RX ORDER — ONDANSETRON 8 MG/1
8 TABLET, ORALLY DISINTEGRATING ORAL EVERY 8 HOURS PRN
Status: DISCONTINUED | OUTPATIENT
Start: 2023-03-21 | End: 2023-03-22 | Stop reason: HOSPADM

## 2023-03-21 RX ORDER — ZOLPIDEM TARTRATE 5 MG/1
5 TABLET ORAL NIGHTLY PRN
Status: DISCONTINUED | OUTPATIENT
Start: 2023-03-21 | End: 2023-03-22 | Stop reason: HOSPADM

## 2023-03-21 RX ORDER — FAMOTIDINE 10 MG/ML
20 INJECTION INTRAVENOUS 2 TIMES DAILY
Status: DISCONTINUED | OUTPATIENT
Start: 2023-03-21 | End: 2023-03-22 | Stop reason: HOSPADM

## 2023-03-21 RX ORDER — PRAZOSIN HYDROCHLORIDE 5 MG/1
5 CAPSULE ORAL NIGHTLY
Status: DISCONTINUED | OUTPATIENT
Start: 2023-03-21 | End: 2023-03-22 | Stop reason: HOSPADM

## 2023-03-21 RX ORDER — DEXTROSE 40 %
30 GEL (GRAM) ORAL
Status: DISCONTINUED | OUTPATIENT
Start: 2023-03-21 | End: 2023-03-22 | Stop reason: HOSPADM

## 2023-03-21 RX ORDER — HYDROCODONE BITARTRATE AND ACETAMINOPHEN 500; 5 MG/1; MG/1
TABLET ORAL
Status: DISCONTINUED | OUTPATIENT
Start: 2023-03-21 | End: 2023-03-22 | Stop reason: HOSPADM

## 2023-03-21 RX ORDER — SODIUM CHLORIDE 0.9 % (FLUSH) 0.9 %
10 SYRINGE (ML) INJECTION EVERY 8 HOURS PRN
Status: DISCONTINUED | OUTPATIENT
Start: 2023-03-21 | End: 2023-03-22 | Stop reason: HOSPADM

## 2023-03-21 RX ORDER — FLUOXETINE 10 MG/1
10 CAPSULE ORAL DAILY
Status: DISCONTINUED | OUTPATIENT
Start: 2023-03-21 | End: 2023-03-22 | Stop reason: HOSPADM

## 2023-03-21 RX ORDER — PROCHLORPERAZINE EDISYLATE 5 MG/ML
5 INJECTION INTRAMUSCULAR; INTRAVENOUS EVERY 6 HOURS PRN
Status: DISCONTINUED | OUTPATIENT
Start: 2023-03-21 | End: 2023-03-22 | Stop reason: HOSPADM

## 2023-03-21 RX ORDER — ACETAMINOPHEN 325 MG/1
650 TABLET ORAL EVERY 6 HOURS PRN
Status: DISCONTINUED | OUTPATIENT
Start: 2023-03-21 | End: 2023-03-22 | Stop reason: HOSPADM

## 2023-03-21 RX ORDER — POLYETHYLENE GLYCOL 3350 17 G/17G
17 POWDER, FOR SOLUTION ORAL DAILY PRN
Status: DISCONTINUED | OUTPATIENT
Start: 2023-03-21 | End: 2023-03-22 | Stop reason: HOSPADM

## 2023-03-21 RX ORDER — FLUOXETINE 10 MG/1
10 CAPSULE ORAL
COMMUNITY

## 2023-03-21 RX ORDER — GLUCAGON 1 MG
1 KIT INJECTION
Status: DISCONTINUED | OUTPATIENT
Start: 2023-03-21 | End: 2023-03-22 | Stop reason: HOSPADM

## 2023-03-21 RX ORDER — MAG HYDROX/ALUMINUM HYD/SIMETH 200-200-20
30 SUSPENSION, ORAL (FINAL DOSE FORM) ORAL 4 TIMES DAILY PRN
Status: DISCONTINUED | OUTPATIENT
Start: 2023-03-21 | End: 2023-03-22 | Stop reason: HOSPADM

## 2023-03-21 RX ORDER — ZOLPIDEM TARTRATE 5 MG/1
5 TABLET ORAL NIGHTLY PRN
COMMUNITY
Start: 2023-03-21

## 2023-03-21 RX ORDER — LANOLIN ALCOHOL/MO/W.PET/CERES
1 CREAM (GRAM) TOPICAL DAILY
Status: DISCONTINUED | OUTPATIENT
Start: 2023-03-22 | End: 2023-03-22 | Stop reason: HOSPADM

## 2023-03-21 RX ORDER — NALOXONE HCL 0.4 MG/ML
0.02 VIAL (ML) INJECTION
Status: DISCONTINUED | OUTPATIENT
Start: 2023-03-21 | End: 2023-03-22 | Stop reason: HOSPADM

## 2023-03-21 RX ORDER — DEXTROSE 40 %
15 GEL (GRAM) ORAL
Status: DISCONTINUED | OUTPATIENT
Start: 2023-03-21 | End: 2023-03-22 | Stop reason: HOSPADM

## 2023-03-21 RX ADMIN — ONDANSETRON 8 MG: 8 TABLET, ORALLY DISINTEGRATING ORAL at 10:03

## 2023-03-21 RX ADMIN — FAMOTIDINE 20 MG: 10 INJECTION, SOLUTION INTRAVENOUS at 11:03

## 2023-03-21 RX ADMIN — PRAZOSIN HYDROCHLORIDE 5 MG: 5 CAPSULE ORAL at 09:03

## 2023-03-21 RX ADMIN — ACETAMINOPHEN 650 MG: 325 TABLET ORAL at 10:03

## 2023-03-21 RX ADMIN — FLUOXETINE 10 MG: 10 CAPSULE ORAL at 09:03

## 2023-03-21 NOTE — ASSESSMENT & PLAN NOTE
Recurrent admissions for acute blood loss anemia suspected 2/2 to menorrhagia. Symptoms of dyspnea and fatigue  - Hgb 4.7 on presentation to ED. HDS.   - T&S performed, consents obtained for blood transfusion   - 3U PRBC ordered  - BRANDON negative, FOBT negative in ED  - LMP 3/1/23, reports it was 5-7 days, denies heavy flow  - Denies hematemesis, hematochezia, melena  - Trend CBC q8 hours, then daily  - Gyn consulted, appreciate recs  - Will recommend Hematology follow up after discharge for IV iron infusions

## 2023-03-21 NOTE — ED PROVIDER NOTES
Encounter Date: 3/21/2023       History     Chief Complaint   Patient presents with    Abnormal Lab     Sent for low H&H. Hx of transfusions. Pt complaining of SOB     42-year-old female with history of anemia requiring transfusions, bipolar disorder presents with dyspnea on exertion and feeling weak for the last several weeks.  She went to her doctor and had blood tests drawn.  She was noted to be markedly anemic.  She states she has heavy periods but these have been constant and thinks that she has more of a GI problem.  She had GI bleeding a year ago but has not noticed any blood in her stool.    Review of patient's allergies indicates:   Allergen Reactions    Tramadol Itching and Swelling     No past medical history on file.  No past surgical history on file.  No family history on file.  Social History     Tobacco Use    Smoking status: Never    Smokeless tobacco: Never     Review of Systems    Physical Exam     Initial Vitals [03/21/23 1141]   BP Pulse Resp Temp SpO2   130/61 89 16 98.5 °F (36.9 °C) 100 %      MAP       --         Physical Exam    Constitutional: She appears well-developed and well-nourished.   HENT:   Head: Normocephalic and atraumatic.   Eyes:   Pale conjunctiva   Neck: Neck supple.   Normal range of motion.  Cardiovascular:  Normal rate, regular rhythm and normal heart sounds.           Pulmonary/Chest: Breath sounds normal. No respiratory distress. She has no wheezes. She has no rales.   Abdominal: Abdomen is soft. Bowel sounds are normal. She exhibits no distension. There is no abdominal tenderness.   Genitourinary: Rectum:      Guaiac result negative.   Guaiac negative stool.    Genitourinary Comments: Rectal - no tenderness or masses.  No blood     Musculoskeletal:         General: No edema. Normal range of motion.      Cervical back: Normal range of motion and neck supple.     Neurological: She is alert. She has normal strength.   Skin: Capillary refill takes less than 2 seconds.    Psychiatric: She has a normal mood and affect.       ED Course   Procedures  Labs Reviewed   CBC W/ AUTO DIFFERENTIAL - Abnormal; Notable for the following components:       Result Value    RBC 2.82 (*)     Hemoglobin 4.7 (*)     Hematocrit 18.7 (*)     MCV 66 (*)     MCH 16.7 (*)     MCHC 25.1 (*)     RDW 23.8 (*)     All other components within normal limits    Narrative:     Release to patient->Immediate  H and H critical SECURE MESSAGED and confirmation received by   THOMAS MORA in [facility]-ED by ANN-MARIE 03/21/2023 12:29   COMPREHENSIVE METABOLIC PANEL - Abnormal; Notable for the following components:    Anion Gap 6 (*)     All other components within normal limits    Narrative:     Release to patient->Immediate   URINALYSIS - Abnormal; Notable for the following components:    Appearance, UA Hazy (*)     Occult Blood UA Trace (*)     Leukocytes, UA 2+ (*)     All other components within normal limits   URINALYSIS MICROSCOPIC - Abnormal; Notable for the following components:    RBC, UA 9 (*)     WBC, UA 23 (*)     Bacteria Few (*)     All other components within normal limits   POCT GLUCOSE - Abnormal; Notable for the following components:    POCT Glucose 127 (*)     All other components within normal limits   HIV 1 / 2 ANTIBODY   HEPATITIS C ANTIBODY   PROTIME-INR   CBC W/ AUTO DIFFERENTIAL   POCT URINE PREGNANCY   POCT GLUCOSE, HAND-HELD DEVICE   POCT GLUCOSE, HAND-HELD DEVICE   TYPE & SCREEN   POCT GLUCOSE   PREPARE RBC SOFT   PREPARE RBC SOFT     EKG Readings: (Independently Interpreted)   Normal sinus rhythm at 77 without ischemic changes   ECG Results              EKG 12-lead (Final result)  Result time 03/21/23 15:06:52      Final result by Interface, Lab In [facility] (03/21/23 15:06:52)                   Narrative:    Test Reason : R06.02,    Vent. Rate : 077 BPM     Atrial Rate : 077 BPM     P-R Int : 142 ms          QRS Dur : 102 ms      QT Int : 392 ms       P-R-T Axes : 057 045 030 degrees      QTc Int : 443 ms    Normal sinus rhythm  Possible Left atrial enlargement  Incomplete right bundle branch block  Borderline Abnormal ECG  No previous ECGs available  Confirmed by Amber LANDA, Kerri ([id number]) on 3/21/2023 3:06:41 PM    Referred By: ATTILA   SELF           Confirmed By:Kerri Clark MD                                  Imaging Results    None          Medications   0.9%  NaCl infusion (for blood administration) (has no administration in time range)   sodium chloride 0.9% flush 10 mL (has no administration in time range)   ondansetron disintegrating tablet 8 mg (has no administration in time range)   prochlorperazine injection Soln 5 mg (has no administration in time range)   polyethylene glycol packet 17 g (has no administration in time range)   acetaminophen tablet 650 mg (has no administration in time range)   aluminum-magnesium hydroxide-simethicone 200-200-20 mg/5 mL suspension 30 mL (has no administration in time range)   naloxone 0.4 mg/mL injection 0.02 mg (has no administration in time range)   glucagon (human recombinant) injection 1 mg (has no administration in time range)   dextrose 10% bolus 125 mL 125 mL (has no administration in time range)   dextrose 10% bolus 250 mL 250 mL (has no administration in time range)   dextrose 40 % gel 15,000 mg (has no administration in time range)   dextrose 40 % gel 30,000 mg (has no administration in time range)   0.9%  NaCl infusion (for blood administration) (has no administration in time range)   FLUoxetine capsule 10 mg (has no administration in time range)   prazosin capsule 5 mg (has no administration in time range)   zolpidem tablet 5 mg (has no administration in time range)   ferrous sulfate tablet 1 each (has no administration in time range)     Medical Decision Making:   Initial Assessment:   Patient with significant anemia.  Hemoglobin is 4.7.  I consented patient for blood transfusion.  .  Will initiate emergent blood transfusion in the ED.   Will consult Hospital Medicine          Attending Attestation:         Attending Critical Care:   Critical Care Times:   Direct Patient Care (initial evaluation, reassessments, and time considering the case)................................................................22 minutes.   Additional History from reviewing old medical records or taking additional history from the family, EMS, PCP, etc.......................3 minutes.   Ordering, Reviewing, and Interpreting Diagnostic Studies...............................................................................................................3 minutes.   Documentation..................................................................................................................................................................................3 minutes.   Consultation with other Physicians. .................................................................................................................................................3 minutes.   ==============================================================  Total Critical Care Time - exclusive of procedural time: 34 minutes.  ==============================================================                     Clinical Impression:   Final diagnoses:  [R06.02] SOB (shortness of breath)  [D64.9] Symptomatic anemia (Primary)        ED Disposition Condition    Observation                 Rocco Cuenca MD  03/21/23 2040

## 2023-03-21 NOTE — Clinical Note
Diagnosis: Symptomatic anemia [[id number]]   Future Attending Provider: LARRY RAPP [[id number]]   Admitting Provider:: EBONI COWAN [[id number]]

## 2023-03-21 NOTE — FIRST PROVIDER EVALUATION
Emergency Department TeleTriage Encounter Note      CHIEF COMPLAINT    Chief Complaint   Patient presents with    Abnormal Lab     Sent for low H&H. Hx of transfusions. Pt complaining of SOB       VITAL SIGNS   Initial Vitals [03/21/23 1141]   BP Pulse Resp Temp SpO2   130/61 89 16 98.5 °F (36.9 °C) 100 %      MAP       --            ALLERGIES    Review of patient's allergies indicates:  No Known Allergies    PROVIDER TRIAGE NOTE  This is a teletriage evaluation of a 42 y.o. female presenting to the ED with c/o low H/H on labs - 3/16//23 - ?4 and ?. Reports shortness of breath and nausea/vomiting.  Limited physical exam via telehealth: The patient is awake, alert, answering questions appropriately and is not in respiratory distress. Initial orders will be placed and care will be transferred to an alternate provider when patient is roomed for a full evaluation. Any additional orders and the final disposition will be determined by that provider.         ORDERS  Labs Reviewed   HIV 1 / 2 ANTIBODY   HEPATITIS C ANTIBODY       ED Orders (720h ago, onward)      Start Ordered     Status Ordering Provider    03/21/23 1153 03/21/23 1152  POCT urine pregnancy  Once         Ordered TRAMAINE LOZOYA P.    03/21/23 1152 03/21/23 1152  Saline lock IV (18 ga. or larger)  Once         Ordered TRAMAINE LOZOYA P.    03/21/23 1152 03/21/23 1152  CBC auto differential  STAT         Ordered TRAMAINE LOZOYA P.    03/21/23 1152 03/21/23 1152  Comprehensive metabolic panel  STAT         Ordered TRAMAINE LOZOYA P.    03/21/23 1152 03/21/23 1152  Type & Screen  STAT         Ordered TRAMAINE LOZOYA P.    03/21/23 1152 03/21/23 1152  Vital signs  Once         Ordered TRAMAINE LOZOYA P.    03/21/23 1152 03/21/23 1152  Cardiac Monitoring - Adult  Continuous        Comments: Notify Physician If:    Ordered TRAMAINE LOZOYA P.    03/21/23 1152 03/21/23 1152  Pulse Oximetry Continuous  Continuous         Ordered TRAMAINE LOZOYA P.    03/21/23 1152 03/21/23 1152   Possible Hospitalization  Once        Comments: For further elaboration on reason for hospitalization.    Ordered TRAMAINE LOZOYA P.    03/21/23 1144 03/21/23 1143  EKG 12-lead  Once         Ordered BREANA SHOEMAKER.    03/21/23 1143 03/21/23 1143  HIV 1/2 Ag/Ab (4th Gen)  STAT         Ordered BREANNE COATES    03/21/23 1143 03/21/23 1143  Hepatitis C Antibody  STAT         Ordered BREANNE COATES              Virtual Visit Note: The provider triage portion of this emergency department evaluation and documentation was performed via [employer], a HIPAA-compliant telemedicine application, in concert with a tele-presenter in the room. A face to face patient evaluation with one of my colleagues will occur once the patient is placed in an emergency department room.      DISCLAIMER: This note was prepared with [employer] voice recognition transcription software. Garbled syntax, mangled pronouns, and other bizarre constructions may be attributed to that software system.

## 2023-03-21 NOTE — SUBJECTIVE & OBJECTIVE
No past medical history on file.    No past surgical history on file.    Review of patient's allergies indicates:   Allergen Reactions    Tramadol Itching and Swelling       No current facility-administered medications on file prior to encounter.     Current Outpatient Medications on File Prior to Encounter   Medication Sig    FLUoxetine 10 MG capsule Take 10 mg by mouth.    prazosin (MINIPRESS) 5 MG capsule Take 5 mg by mouth every evening.    zolpidem (AMBIEN) 5 MG Tab Take 5 mg by mouth nightly as needed (INSOMMNIA).     Family History    None       Tobacco Use    Smoking status: Never    Smokeless tobacco: Never   Substance and Sexual Activity    Alcohol use: Not on file    Drug use: Not on file    Sexual activity: Not on file     Review of Systems   Constitutional:  Positive for fatigue. Negative for activity change, chills and fever.   HENT:  Negative for trouble swallowing.    Eyes:  Negative for photophobia and visual disturbance.   Respiratory:  Positive for shortness of breath. Negative for cough and chest tightness.    Cardiovascular:  Negative for chest pain, palpitations and leg swelling.   Gastrointestinal:  Positive for abdominal pain (BUQ burning pain), nausea and vomiting. Negative for constipation and diarrhea.   Genitourinary:  Negative for dysuria, frequency and hematuria.   Musculoskeletal:  Negative for back pain, gait problem and neck pain.   Skin:  Negative for rash and wound.   Neurological:  Positive for weakness. Negative for dizziness, syncope, speech difficulty and light-headedness.   Psychiatric/Behavioral:  Negative for agitation and confusion. The patient is not nervous/anxious.    Objective:     Vital Signs (Most Recent):  Temp: 98.4 °F (36.9 °C) (03/21/23 1546)  Pulse: 67 (03/21/23 1546)  Resp: 16 (03/21/23 1546)  BP: 128/62 (03/21/23 1546)  SpO2: 100 % (03/21/23 1546) Vital Signs (24h Range):  Temp:  [98.1 °F (36.7 °C)-98.6 °F (37 °C)] 98.4 °F (36.9 °C)  Pulse:  [67-89] 67  Resp:   [14-18] 16  SpO2:  [100 %] 100 %  BP: (122-145)/(61-75) 128/62     Weight: 80.3 kg (177 lb)  Body mass index is 33.44 kg/m².    Physical Exam  Vitals and nursing note reviewed.   Constitutional:       General: She is not in acute distress.     Appearance: She is well-developed.   HENT:      Head: Normocephalic and atraumatic.      Mouth/Throat:      Pharynx: No oropharyngeal exudate.   Eyes:      Conjunctiva/sclera: Conjunctivae normal.      Pupils: Pupils are equal, round, and reactive to light.   Cardiovascular:      Rate and Rhythm: Normal rate and regular rhythm.      Heart sounds: Normal heart sounds.   Pulmonary:      Effort: Pulmonary effort is normal. No respiratory distress.      Breath sounds: Normal breath sounds. No wheezing.   Abdominal:      General: Bowel sounds are normal. There is no distension.      Palpations: Abdomen is soft.      Tenderness: There is no abdominal tenderness.   Musculoskeletal:         General: No tenderness. Normal range of motion.      Cervical back: Normal range of motion and neck supple.   Lymphadenopathy:      Cervical: No cervical adenopathy.   Skin:     General: Skin is warm and dry.      Capillary Refill: Capillary refill takes less than 2 seconds.      Findings: No rash.   Neurological:      Mental Status: She is alert and oriented to person, place, and time.      Cranial Nerves: No cranial nerve deficit.      Sensory: No sensory deficit.      Coordination: Coordination normal.   Psychiatric:         Behavior: Behavior normal.         Thought Content: Thought content normal.         Judgment: Judgment normal.         CRANIAL NERVES     CN III, IV, VI   Pupils are equal, round, and reactive to light.     Significant Labs: All pertinent labs within the past 24 hours have been reviewed.  CBC:   Recent Labs   Lab 03/21/23  1157   WBC 5.65   HGB 4.7*   HCT 18.7*        CMP:   Recent Labs   Lab 03/21/23  1157      K 4.0      CO2 24   GLU 99   BUN 8    CREATININE 0.8   CALCIUM 8.9   PROT 6.8   ALBUMIN 3.7   BILITOT 0.5   ALKPHOS 73   AST 14   ALT 13   ANIONGAP 6*       Significant Imaging: I have reviewed all pertinent imaging results/findings within the past 24 hours.

## 2023-03-21 NOTE — HPI
"Janay Mathis is a 42 y.o. female with a PMHx of anemia requiring transfusions and bipolar disorder who presents to the ED c/o dyspnea on exertion and generalized weakness for several weeks. Found to have a Hgb of 4.7 in ED. Patient reports history of severe anemia requiring transfusions that she reports unknown source of blood loss. She was seen in 02/2023 at [facility] for symptomatic anemia but left AMA after receiving 3 U PRBCs. She was advised to have OB and GI follow up. Patient reports LMP was 03/01/2023. States her cycles have always been irregular, typically lasting 5-7 days. Reports her last few cycles have been lighter, stating cramps were mild and did not pass clots like she used to. She does not take OCP. Was seen in ED in 08/2022 for CC of "rectal bleeding", but provider noted benign abdominal exam, rectal exam negative for blood, and guaiac negative. She does reports intermittent BUQ abdominal "burning" pain that waxes and wanes. She denies any hematemesis, hematochezia/BRBPR, or melena. She does endorse occasional N/V that she feels is associated to the severe anemia. She denies any HA, F/C, chest pain, abdominal pain, vaginal bleeding (since LMP), hematuria, dysuria, diarrhea, or constipation. Last BM was yesterday, reports it was normal.       ED: [facility]. CBC with H/H 4.7/18.7. . CMP unremarkable. T&S and consent obtained. 3U PRBC ordered for transfusion. EKG NSR, possible LA enlargement, incomplete RBBB. Stool guaiac negative. BRANDON no tenderness, masses, or blood. Admitted to Hospital Medicine for further management. UPT pending.  "

## 2023-03-21 NOTE — H&P
"[facility] - Emergency Dept  [facility] Medicine  History & Physical    Patient Name: Janay Mathis  MRN: [id number]  Patient Class: OP- Observation  Admission Date: 3/21/2023  Attending Physician: Kamille Gross MD   Primary Care Provider: Rahul Del Rio MD         Patient information was obtained from patient, past medical records and ER records.     Subjective:     Principal Problem:Acute blood loss anemia    Chief Complaint:   Chief Complaint   Patient presents with    Abnormal Lab     Sent for low H&H. Hx of transfusions. Pt complaining of SOB        HPI: Janay Mathis is a 42 y.o. female with a PMHx of anemia requiring transfusions and bipolar disorder who presents to the ED c/o dyspnea on exertion and generalized weakness for several weeks. Found to have a Hgb of 4.7 in ED. Patient reports history of severe anemia requiring transfusions that she reports unknown source of blood loss. She was seen in 02/2023 at [facility] for symptomatic anemia but left AMA after receiving 3 U PRBCs. She was advised to have OB and GI follow up. Patient reports LMP was 03/01/2023. States her cycles have always been irregular, typically lasting 5-7 days. Reports her last few cycles have been lighter, stating cramps were mild and did not pass clots like she used to. She does not take OCP. Was seen in ED in 08/2022 for CC of "rectal bleeding", but provider noted benign abdominal exam, rectal exam negative for blood, and guaiac negative. She does reports intermittent BUQ abdominal "burning" pain that waxes and wanes. She denies any hematemesis, hematochezia/BRBPR, or melena. She does endorse occasional N/V that she feels is associated to the severe anemia. She denies any HA, F/C, chest pain, abdominal pain, vaginal bleeding (since LMP), hematuria, dysuria, diarrhea, or constipation. Last BM was yesterday, reports it was normal.       ED: AFVSS. CBC with H/H 4.7/18.7. . CMP unremarkable. T&S and consent obtained. 3U PRBC ordered for " transfusion. EKG NSR, possible LA enlargement, incomplete RBBB. Stool guaiac negative. BRANDON no tenderness, masses, or blood. Admitted to Hospital Medicine for further management. UPT pending.      No past medical history on file.    No past surgical history on file.    Review of patient's allergies indicates:   Allergen Reactions    Tramadol Itching and Swelling       No current facility-administered medications on file prior to encounter.     Current Outpatient Medications on File Prior to Encounter   Medication Sig    FLUoxetine 10 MG capsule Take 10 mg by mouth.    prazosin (MINIPRESS) 5 MG capsule Take 5 mg by mouth every evening.    zolpidem (AMBIEN) 5 MG Tab Take 5 mg by mouth nightly as needed (INSOMMNIA).     Family History    None       Tobacco Use    Smoking status: Never    Smokeless tobacco: Never   Substance and Sexual Activity    Alcohol use: Not on file    Drug use: Not on file    Sexual activity: Not on file     Review of Systems   Constitutional:  Positive for fatigue. Negative for activity change, chills and fever.   HENT:  Negative for trouble swallowing.    Eyes:  Negative for photophobia and visual disturbance.   Respiratory:  Positive for shortness of breath. Negative for cough and chest tightness.    Cardiovascular:  Negative for chest pain, palpitations and leg swelling.   Gastrointestinal:  Positive for abdominal pain (BUQ burning pain), nausea and vomiting. Negative for constipation and diarrhea.   Genitourinary:  Negative for dysuria, frequency and hematuria.   Musculoskeletal:  Negative for back pain, gait problem and neck pain.   Skin:  Negative for rash and wound.   Neurological:  Positive for weakness. Negative for dizziness, syncope, speech difficulty and light-headedness.   Psychiatric/Behavioral:  Negative for agitation and confusion. The patient is not nervous/anxious.    Objective:     Vital Signs (Most Recent):  Temp: 98.4 °F (36.9 °C) (03/21/23 1546)  Pulse: 67 (03/21/23  1546)  Resp: 16 (03/21/23 1546)  BP: 128/62 (03/21/23 1546)  SpO2: 100 % (03/21/23 1546) Vital Signs (24h Range):  Temp:  [98.1 °F (36.7 °C)-98.6 °F (37 °C)] 98.4 °F (36.9 °C)  Pulse:  [67-89] 67  Resp:  [14-18] 16  SpO2:  [100 %] 100 %  BP: (122-145)/(61-75) 128/62     Weight: 80.3 kg (177 lb)  Body mass index is 33.44 kg/m².    Physical Exam  Vitals and nursing note reviewed.   Constitutional:       General: She is not in acute distress.     Appearance: She is well-developed.   HENT:      Head: Normocephalic and atraumatic.      Mouth/Throat:      Pharynx: No oropharyngeal exudate.   Eyes:      Conjunctiva/sclera: Conjunctivae normal.      Pupils: Pupils are equal, round, and reactive to light.   Cardiovascular:      Rate and Rhythm: Normal rate and regular rhythm.      Heart sounds: Normal heart sounds.   Pulmonary:      Effort: Pulmonary effort is normal. No respiratory distress.      Breath sounds: Normal breath sounds. No wheezing.   Abdominal:      General: Bowel sounds are normal. There is no distension.      Palpations: Abdomen is soft.      Tenderness: There is no abdominal tenderness.   Musculoskeletal:         General: No tenderness. Normal range of motion.      Cervical back: Normal range of motion and neck supple.   Lymphadenopathy:      Cervical: No cervical adenopathy.   Skin:     General: Skin is warm and dry.      Capillary Refill: Capillary refill takes less than 2 seconds.      Findings: No rash.   Neurological:      Mental Status: She is alert and oriented to person, place, and time.      Cranial Nerves: No cranial nerve deficit.      Sensory: No sensory deficit.      Coordination: Coordination normal.   Psychiatric:         Behavior: Behavior normal.         Thought Content: Thought content normal.         Judgment: Judgment normal.         CRANIAL NERVES     CN III, IV, VI   Pupils are equal, round, and reactive to light.     Significant Labs: All pertinent labs within the past 24 hours have  been reviewed.  CBC:   Recent Labs   Lab 03/21/23  1157   WBC 5.65   HGB 4.7*   HCT 18.7*        CMP:   Recent Labs   Lab 03/21/23  1157      K 4.0      CO2 24   GLU 99   BUN 8   CREATININE 0.8   CALCIUM 8.9   PROT 6.8   ALBUMIN 3.7   BILITOT 0.5   ALKPHOS 73   AST 14   ALT 13   ANIONGAP 6*       Significant Imaging: I have reviewed all pertinent imaging results/findings within the past 24 hours.        Assessment/Plan:     * Acute blood loss anemia  Recurrent admissions for acute blood loss anemia suspected 2/2 to menorrhagia. Symptoms of dyspnea and fatigue  - Hgb 4.7 on presentation to ED. HDS.   - T&S performed, consents obtained for blood transfusion   - 3U PRBC ordered  - BRANDON negative, FOBT negative in ED  - LMP 3/1/23, reports it was 5-7 days, denies heavy flow  - UPT pending  - Denies hematemesis, hematochezia, melena  - Trend CBC q8 hours, then daily  - Gyn consulted, appreciate recs  - Will recommend Hematology follow up after discharge for IV iron infusions    Bipolar affective disorder, currently active  - Continue Prozac, Prazosin qhs, Ambien prn    VTE Risk Mitigation (From admission, onward)           Ordered     IP VTE HIGH RISK PATIENT  Once         03/21/23 1424     Place sequential compression device  Until discontinued         03/21/23 1424                         On 03/21/2023, patient should be placed in hospital observation services under my care in collaboration with Kamille Gross MD.      Jose Vanegas PA-C  Department of Hospital Medicine  [facility] - Emergency Dept

## 2023-03-21 NOTE — ASSESSMENT & PLAN NOTE
Recurrent admissions for acute blood loss anemia suspected 2/2 to menorrhagia. Symptoms of dyspnea and fatigue  - Hgb 4.7 on presentation to ED. HDS.   - T&S performed, consents obtained for blood transfusion   - 3U PRBC ordered  - BRANDON negative, FOBT negative in ED  - LMP 3/1/23, reports it was 5-7 days, denies heavy flow  - UPT negative  - Denies hematemesis, hematochezia, melena  - Trend CBC q8 hours, then daily  - Gyn consulted, appreciate recs  - Will recommend Hematology follow up after discharge for IV iron infusions

## 2023-03-21 NOTE — NURSING
Pt arrived to EDOU 05 Aox4 with no signs of distress noted. VSS, blood transfusing with 53 mls remaining. Bed locked in lowest position with bed rails x2 up. Call light and bedside table in reach. Pt reminded of the need to collect urine. Urine specimen cup provided. Will continue to monitor.

## 2023-03-21 NOTE — PHARMACY MED REC
"Admission Medication History     The home medication history was taken by Archie Arzate.    You may go to "Admission" then "Reconcile Home Medications" tabs to review and/or act upon these items.     The home medication list has been updated by the Pharmacy department.   Please read ALL comments highlighted in yellow.   Please address this information as you see fit.    Feel free to contact us if you have any questions or require assistance.        Current Outpatient Medications on File Prior to Encounter   Medication Sig    FLUoxetine 10 MG capsule   Take 10 mg by mouth.    prazosin (MINIPRESS) 5 MG capsule   Take 5 mg by mouth every evening.    zolpidem (AMBIEN) 5 MG Tab   Take 1 tablet by mouth at bedtime as needed for insomnia         Potential issues to be addressed PRIOR TO DISCHARGE  Patient requires education regarding drug therapies     Archie Arzate  [id number]                  .        "

## 2023-03-21 NOTE — ED TRIAGE NOTES
Janay RAMOS Del, a 42 y.o. female presents to the ED w/ complaint of low H&H, SOB, nausea and stomach pain.     Hx- blood transfusions.     Triage note:  Chief Complaint   Patient presents with    Abnormal Lab     Sent for low H&H. Hx of transfusions. Pt complaining of SOB     Review of patient's allergies indicates:  No Known Allergies  No past medical history on file.

## 2023-03-22 VITALS
BODY MASS INDEX: 32.42 KG/M2 | WEIGHT: 171.75 LBS | RESPIRATION RATE: 18 BRPM | HEART RATE: 71 BPM | OXYGEN SATURATION: 100 % | SYSTOLIC BLOOD PRESSURE: 137 MMHG | TEMPERATURE: 98 F | HEIGHT: 61 IN | DIASTOLIC BLOOD PRESSURE: 67 MMHG

## 2023-03-22 LAB
ANION GAP SERPL CALC-SCNC: 10 MMOL/L (ref 8–16)
BASOPHILS # BLD AUTO: 0.02 K/UL (ref 0–0.2)
BASOPHILS NFR BLD: 0.3 % (ref 0–1.9)
BUN SERPL-MCNC: 8 MG/DL (ref 6–20)
CALCIUM SERPL-MCNC: 9 MG/DL (ref 8.7–10.5)
CHLORIDE SERPL-SCNC: 107 MMOL/L (ref 95–110)
CO2 SERPL-SCNC: 21 MMOL/L (ref 23–29)
CREAT SERPL-MCNC: 0.8 MG/DL (ref 0.5–1.4)
DIFFERENTIAL METHOD: ABNORMAL
EOSINOPHIL # BLD AUTO: 0 K/UL (ref 0–0.5)
EOSINOPHIL NFR BLD: 0.6 % (ref 0–8)
ERYTHROCYTE [DISTWIDTH] IN BLOOD BY AUTOMATED COUNT: 26.7 % (ref 11.5–14.5)
EST. GFR  (NO RACE VARIABLE): >60 ML/MIN/1.73 M^2
GLUCOSE SERPL-MCNC: 76 MG/DL (ref 70–110)
HCT VFR BLD AUTO: 24.7 % (ref 37–48.5)
HGB BLD-MCNC: 6.9 G/DL (ref 12–16)
IMM GRANULOCYTES # BLD AUTO: 0.01 K/UL (ref 0–0.04)
IMM GRANULOCYTES NFR BLD AUTO: 0.2 % (ref 0–0.5)
LYMPHOCYTES # BLD AUTO: 1.2 K/UL (ref 1–4.8)
LYMPHOCYTES NFR BLD: 19.1 % (ref 18–48)
MAGNESIUM SERPL-MCNC: 1.8 MG/DL (ref 1.6–2.6)
MCH RBC QN AUTO: 20.1 PG (ref 27–31)
MCHC RBC AUTO-ENTMCNC: 27.9 G/DL (ref 32–36)
MCV RBC AUTO: 72 FL (ref 82–98)
MONOCYTES # BLD AUTO: 0.4 K/UL (ref 0.3–1)
MONOCYTES NFR BLD: 7 % (ref 4–15)
NEUTROPHILS # BLD AUTO: 4.6 K/UL (ref 1.8–7.7)
NEUTROPHILS NFR BLD: 72.8 % (ref 38–73)
NRBC BLD-RTO: 0 /100 WBC
PHOSPHATE SERPL-MCNC: 3.7 MG/DL (ref 2.7–4.5)
PLATELET # BLD AUTO: 335 K/UL (ref 150–450)
PMV BLD AUTO: 10.1 FL (ref 9.2–12.9)
POCT GLUCOSE: 88 MG/DL (ref 70–110)
POTASSIUM SERPL-SCNC: 4.3 MMOL/L (ref 3.5–5.1)
RBC # BLD AUTO: 3.44 M/UL (ref 4–5.4)
SODIUM SERPL-SCNC: 138 MMOL/L (ref 136–145)
WBC # BLD AUTO: 6.27 K/UL (ref 3.9–12.7)

## 2023-03-22 PROCEDURE — 83735 ASSAY OF MAGNESIUM: CPT

## 2023-03-22 PROCEDURE — 36415 COLL VENOUS BLD VENIPUNCTURE: CPT

## 2023-03-22 PROCEDURE — G0378 HOSPITAL OBSERVATION PER HR: HCPCS

## 2023-03-22 PROCEDURE — 80048 BASIC METABOLIC PNL TOTAL CA: CPT

## 2023-03-22 PROCEDURE — 85025 COMPLETE CBC W/AUTO DIFF WBC: CPT

## 2023-03-22 PROCEDURE — 84100 ASSAY OF PHOSPHORUS: CPT

## 2023-03-22 PROCEDURE — 99238 HOSP IP/OBS DSCHRG MGMT 30/<: CPT | Mod: ,,,

## 2023-03-22 PROCEDURE — 99238 PR HOSPITAL DISCHARGE DAY,<30 MIN: ICD-10-PCS | Mod: ,,,

## 2023-03-22 RX ORDER — HYDROCODONE BITARTRATE AND ACETAMINOPHEN 500; 5 MG/1; MG/1
TABLET ORAL
Status: DISCONTINUED | OUTPATIENT
Start: 2023-03-22 | End: 2023-03-22 | Stop reason: HOSPADM

## 2023-03-22 NOTE — ED NOTES
Telemetry Verification   Patient placed on Telemetry Box  Verified with War Room  Box # [id number]   Monitor Tech    Rate 77   Rhythm NS

## 2023-03-22 NOTE — NURSING
Went in to introduce myself to the pt. Pt is not in the room at this time.     Pt told charge nurse she was going shower. RN went to look for pt but she wasn't in any of the showers. Pt left AMA. Charge nurse notified LINDY Ross.

## 2023-03-24 LAB
BLD PROD TYP BPU: NORMAL
BLD PROD TYP BPU: NORMAL
BLOOD UNIT EXPIRATION DATE: NORMAL
BLOOD UNIT EXPIRATION DATE: NORMAL
BLOOD UNIT TYPE CODE: 5100
BLOOD UNIT TYPE CODE: 5100
BLOOD UNIT TYPE: NORMAL
BLOOD UNIT TYPE: NORMAL
CODING SYSTEM: NORMAL
CODING SYSTEM: NORMAL
CROSSMATCH INTERPRETATION: NORMAL
CROSSMATCH INTERPRETATION: NORMAL
DISPENSE STATUS: NORMAL
DISPENSE STATUS: NORMAL
TRANS ERYTHROCYTES VOL PATIENT: NORMAL ML
TRANS ERYTHROCYTES VOL PATIENT: NORMAL ML

## 2023-03-24 NOTE — HOSPITAL COURSE
Janay Mathis was admitted to Hospital Medicine for symptomatic anemia. Patient was transfused with 3 units of pRBCs. Hgb trended, 4.7 >> 7.7 >> 6.9. Prior to receiving 4th unit, patient left AMA. Unable to discuss risks as patient left without notifying any providers. Discussed case with OB/GYN and Heme/Onc. Referrals placed for outpatient follow up.

## 2023-03-24 NOTE — DISCHARGE SUMMARY
"[facility] - Observation [address] [place] [address] Medicine  Discharge Summary      Patient Name: Janay Mathis  MRN: [id number]  SOPHIE: [id number]  Patient Class: OP- Observation  Admission Date: 3/21/2023  Hospital Length of Stay: 0 days  Discharge Date and Time: 3/22/2023 11:55 AM  Attending Physician: Becky att. providers found   Discharging Provider: Jose Vanegas PA-C  Primary Care Provider: Rahul Del Rio MD  [facility] Medicine Team: [facility] MED F Jose Vanegas PA-C  Primary Care Team: [facility] MED F    HPI:   Janay Mathis is a 42 y.o. female with a PMHx of anemia requiring transfusions and bipolar disorder who presents to the ED c/o dyspnea on exertion and generalized weakness for several weeks. Found to have a Hgb of 4.7 in ED. Patient reports history of severe anemia requiring transfusions that she reports unknown source of blood loss. She was seen in 02/2023 at [facility] for symptomatic anemia but left AMA after receiving 3 U PRBCs. She was advised to have OB and GI follow up. Patient reports LMP was 03/01/2023. States her cycles have always been irregular, typically lasting 5-7 days. Reports her last few cycles have been lighter, stating cramps were mild and did not pass clots like she used to. She does not take OCP. Was seen in ED in 08/2022 for CC of "rectal bleeding", but provider noted benign abdominal exam, rectal exam negative for blood, and guaiac negative. She does reports intermittent BUQ abdominal "burning" pain that waxes and wanes. She denies any hematemesis, hematochezia/BRBPR, or melena. She does endorse occasional N/V that she feels is associated to the severe anemia. She denies any HA, F/C, chest pain, abdominal pain, vaginal bleeding (since LMP), hematuria, dysuria, diarrhea, or constipation. Last BM was yesterday, reports it was normal.       ED: AFVSS. CBC with H/H 4.7/18.7. . CMP unremarkable. T&S and consent obtained. 3U PRBC ordered for transfusion. EKG NSR, possible LA enlargement, " incomplete RBBB. Stool guaiac negative. BRANDON no tenderness, masses, or blood. Admitted to Hospital Medicine for further management. UPT pending.      * No surgery found *      Hospital Course:   Janay Mathis was admitted to Hospital Medicine for symptomatic anemia. Patient was transfused with 3 units of pRBCs. Hgb trended, 4.7 >> 7.7 >> 6.9. Prior to receiving 4th unit, patient left AMA. Unable to discuss risks as patient left without notifying any providers. Discussed case with OB/GYN and Heme/Onc. Referrals placed for outpatient follow up.       Goals of Care Treatment Preferences:  Code Status: Full Code      Consults:     Psychiatric  Bipolar affective disorder, currently active  - Continue Prozac, Prazosin qhs, Ambien prn    Oncology  * Acute blood loss anemia  Recurrent admissions for acute blood loss anemia suspected 2/2 to menorrhagia. Symptoms of dyspnea and fatigue  - Hgb 4.7 on presentation to ED. HDS.   - T&S performed, consents obtained for blood transfusion   - 3U PRBC ordered  - BRANDON negative, FOBT negative in ED  - LMP 3/1/23, reports it was 5-7 days, denies heavy flow  - UPT negative  - Denies hematemesis, hematochezia, melena  - Trend CBC q8 hours, then daily  - Gyn consulted, appreciate recs  - Will recommend Hematology follow up after discharge for IV iron infusions      Final Active Diagnoses:    Diagnosis Date Noted POA    PRINCIPAL PROBLEM:  Acute blood loss anemia [D62] 08/08/2022 Yes    Bipolar affective disorder, currently active [F31.9] 08/08/2022 Yes      Problems Resolved During this Admission:       Discharged Condition: against medical advice    Disposition: Left Against Medical Adv*    Follow Up:   Follow-up Information     Rahul Del Rio MD Follow up in 1 week(s).    Specialty: Family Medicine  Contact information:  [address] [facility] [address]  [phone]                       Patient Instructions:      Ambulatory referral/consult to Hematology / Oncology    Standing Status: Future   Referral Priority: Routine Referral Type: Consultation   Referral Reason: Specialty Services Required   Requested Specialty: Hematology and Oncology   Number of Visits Requested: 1     Diet Adult Regular     Notify your health care provider if you experience any of the following:  temperature >100.4     Notify your health care provider if you experience any of the following:  redness, tenderness, or signs of infection (pain, swelling, redness, odor or green/yellow discharge around incision site)     Notify your health care provider if you experience any of the following:  severe uncontrolled pain     Notify your health care provider if you experience any of the following:  persistent dizziness, light-headedness, or visual disturbances     Notify your health care provider if you experience any of the following:  increased confusion or weakness     Activity as tolerated       Pending Diagnostic Studies:     None         Medications:  Reconciled Home Medications:      Medication List      CONTINUE taking these medications    FLUoxetine 10 MG capsule  Take 10 mg by mouth.     prazosin 5 MG capsule  Commonly known as: MINIPRESS  Take 5 mg by mouth every evening.     zolpidem 5 MG Tab  Commonly known as: AMBIEN  Take 5 mg by mouth nightly as needed (INSOMMNIA).            Indwelling Lines/Drains at time of discharge:   Lines/Drains/Airways     None                 Time spent on the discharge of patient: 0 minutes         Jose Vanegas PA-C  Department of Hospital Medicine  [facility] - Observation 11H

## 2025-01-30 ENCOUNTER — HOSPITAL ENCOUNTER (INPATIENT)
Facility: HOSPITAL | Age: 45
LOS: 4 days | Discharge: HOME | DRG: 394 | End: 2025-02-03
Attending: STUDENT IN AN ORGANIZED HEALTH CARE EDUCATION/TRAINING PROGRAM | Admitting: HOSPITALIST
Payer: MEDICAID

## 2025-01-30 DIAGNOSIS — D64.9 ANEMIA: ICD-10-CM

## 2025-01-30 DIAGNOSIS — K92.2 GI BLEED: ICD-10-CM

## 2025-01-30 DIAGNOSIS — R06.02 SOB (SHORTNESS OF BREATH): ICD-10-CM

## 2025-01-30 PROBLEM — K21.9 GASTROESOPHAGEAL REFLUX DISEASE WITHOUT ESOPHAGITIS: Status: ACTIVE | Noted: 2024-09-26

## 2025-01-30 PROBLEM — K62.5 RECTAL BLEEDING: Status: ACTIVE | Noted: 2025-01-30

## 2025-01-30 PROBLEM — I10 ESSENTIAL HYPERTENSION: Status: ACTIVE | Noted: 2019-02-21

## 2025-01-30 PROBLEM — F43.10 PTSD (POST-TRAUMATIC STRESS DISORDER): Status: ACTIVE | Noted: 2025-01-30

## 2025-01-30 PROBLEM — E53.8 B12 DEFICIENCY: Status: ACTIVE | Noted: 2024-09-26

## 2025-01-30 LAB
ABO + RH BLD: NORMAL
ALBUMIN SERPL BCP-MCNC: 3.8 G/DL (ref 3.5–5.2)
ALP SERPL-CCNC: 92 U/L (ref 40–150)
ALT SERPL W/O P-5'-P-CCNC: 14 U/L (ref 10–44)
ANION GAP SERPL CALC-SCNC: 7 MMOL/L (ref 8–16)
ANISOCYTOSIS BLD QL SMEAR: SLIGHT
APTT PPP: 21.1 SEC (ref 21–32)
AST SERPL-CCNC: 14 U/L (ref 10–40)
B-HCG UR QL: NEGATIVE
BASOPHILS # BLD AUTO: 0.02 K/UL (ref 0–0.2)
BASOPHILS NFR BLD: 0.3 % (ref 0–1.9)
BILIRUB SERPL-MCNC: 0.4 MG/DL (ref 0.1–1)
BLD GP AB SCN CELLS X3 SERPL QL: NORMAL
BNP SERPL-MCNC: 38 PG/ML (ref 0–99)
BUN SERPL-MCNC: 9 MG/DL (ref 6–20)
CALCIUM SERPL-MCNC: 8.9 MG/DL (ref 8.7–10.5)
CHLORIDE SERPL-SCNC: 113 MMOL/L (ref 95–110)
CO2 SERPL-SCNC: 21 MMOL/L (ref 23–29)
CREAT SERPL-MCNC: 0.8 MG/DL (ref 0.5–1.4)
CTP QC/QA: YES
DACRYOCYTES BLD QL SMEAR: ABNORMAL
DIFFERENTIAL METHOD BLD: ABNORMAL
EOSINOPHIL # BLD AUTO: 0 K/UL (ref 0–0.5)
EOSINOPHIL NFR BLD: 0.4 % (ref 0–8)
ERYTHROCYTE [DISTWIDTH] IN BLOOD BY AUTOMATED COUNT: 24.1 % (ref 11.5–14.5)
EST. GFR  (NO RACE VARIABLE): >60 ML/MIN/1.73 M^2
FERRITIN SERPL-MCNC: 7 NG/ML (ref 20–300)
FOLATE SERPL-MCNC: 11.6 NG/ML (ref 4–24)
GLUCOSE SERPL-MCNC: 92 MG/DL (ref 70–110)
HCT VFR BLD AUTO: 14.8 % (ref 37–48.5)
HCV AB SERPL QL IA: NORMAL
HGB BLD-MCNC: 3.9 G/DL (ref 12–16)
HIV 1+2 AB+HIV1 P24 AG SERPL QL IA: NORMAL
HYPOCHROMIA BLD QL SMEAR: ABNORMAL
IMM GRANULOCYTES # BLD AUTO: 0.03 K/UL (ref 0–0.04)
IMM GRANULOCYTES NFR BLD AUTO: 0.4 % (ref 0–0.5)
INR PPP: 1 (ref 0.8–1.2)
LIPASE SERPL-CCNC: 18 U/L (ref 4–60)
LYMPHOCYTES # BLD AUTO: 1.3 K/UL (ref 1–4.8)
LYMPHOCYTES NFR BLD: 16 % (ref 18–48)
MAGNESIUM SERPL-MCNC: 1.9 MG/DL (ref 1.6–2.6)
MCH RBC QN AUTO: 18.9 PG (ref 27–31)
MCHC RBC AUTO-ENTMCNC: 26.4 G/DL (ref 32–36)
MCV RBC AUTO: 72 FL (ref 82–98)
MONOCYTES # BLD AUTO: 0.4 K/UL (ref 0.3–1)
MONOCYTES NFR BLD: 5.6 % (ref 4–15)
NEUTROPHILS # BLD AUTO: 6.1 K/UL (ref 1.8–7.7)
NEUTROPHILS NFR BLD: 77.3 % (ref 38–73)
NRBC BLD-RTO: 0 /100 WBC
OHS QRS DURATION: 108 MS
OHS QTC CALCULATION: 472 MS
OVALOCYTES BLD QL SMEAR: ABNORMAL
PLATELET # BLD AUTO: 144 K/UL (ref 150–450)
PLATELET BLD QL SMEAR: ABNORMAL
PMV BLD AUTO: ABNORMAL FL (ref 9.2–12.9)
POIKILOCYTOSIS BLD QL SMEAR: SLIGHT
POLYCHROMASIA BLD QL SMEAR: ABNORMAL
POTASSIUM SERPL-SCNC: 3.9 MMOL/L (ref 3.5–5.1)
PROT SERPL-MCNC: 7 G/DL (ref 6–8.4)
PROTHROMBIN TIME: 10.9 SEC (ref 9–12.5)
RBC # BLD AUTO: 2.06 M/UL (ref 4–5.4)
SODIUM SERPL-SCNC: 141 MMOL/L (ref 136–145)
SPECIMEN OUTDATE: NORMAL
SPHEROCYTES BLD QL SMEAR: ABNORMAL
TROPONIN I SERPL DL<=0.01 NG/ML-MCNC: <3 NG/L (ref 0–14)
VIT B12 SERPL-MCNC: 639 PG/ML (ref 210–950)
WBC # BLD AUTO: 7.86 K/UL (ref 3.9–12.7)

## 2025-01-30 PROCEDURE — 83735 ASSAY OF MAGNESIUM: CPT

## 2025-01-30 PROCEDURE — 12000002 HC ACUTE/MED SURGE SEMI-PRIVATE ROOM

## 2025-01-30 PROCEDURE — 85025 COMPLETE CBC W/AUTO DIFF WBC: CPT | Performed by: EMERGENCY MEDICINE

## 2025-01-30 PROCEDURE — 84484 ASSAY OF TROPONIN QUANT: CPT

## 2025-01-30 PROCEDURE — 86803 HEPATITIS C AB TEST: CPT | Performed by: PHYSICIAN ASSISTANT

## 2025-01-30 PROCEDURE — 25500020 PHARM REV CODE 255: Performed by: STUDENT IN AN ORGANIZED HEALTH CARE EDUCATION/TRAINING PROGRAM

## 2025-01-30 PROCEDURE — 82728 ASSAY OF FERRITIN: CPT | Performed by: EMERGENCY MEDICINE

## 2025-01-30 PROCEDURE — 93010 ELECTROCARDIOGRAM REPORT: CPT | Mod: ,,, | Performed by: INTERNAL MEDICINE

## 2025-01-30 PROCEDURE — P9021 RED BLOOD CELLS UNIT: HCPCS

## 2025-01-30 PROCEDURE — 87389 HIV-1 AG W/HIV-1&-2 AB AG IA: CPT | Performed by: PHYSICIAN ASSISTANT

## 2025-01-30 PROCEDURE — 81025 URINE PREGNANCY TEST: CPT

## 2025-01-30 PROCEDURE — 30233N1 TRANSFUSION OF NONAUTOLOGOUS RED BLOOD CELLS INTO PERIPHERAL VEIN, PERCUTANEOUS APPROACH: ICD-10-PCS | Performed by: STUDENT IN AN ORGANIZED HEALTH CARE EDUCATION/TRAINING PROGRAM

## 2025-01-30 PROCEDURE — 86850 RBC ANTIBODY SCREEN: CPT | Performed by: EMERGENCY MEDICINE

## 2025-01-30 PROCEDURE — 80053 COMPREHEN METABOLIC PANEL: CPT | Performed by: EMERGENCY MEDICINE

## 2025-01-30 PROCEDURE — 99285 EMERGENCY DEPT VISIT HI MDM: CPT | Mod: 25

## 2025-01-30 PROCEDURE — 93005 ELECTROCARDIOGRAM TRACING: CPT

## 2025-01-30 PROCEDURE — 82746 ASSAY OF FOLIC ACID SERUM: CPT | Performed by: PHYSICIAN ASSISTANT

## 2025-01-30 PROCEDURE — 85730 THROMBOPLASTIN TIME PARTIAL: CPT | Performed by: EMERGENCY MEDICINE

## 2025-01-30 PROCEDURE — 82607 VITAMIN B-12: CPT | Performed by: PHYSICIAN ASSISTANT

## 2025-01-30 PROCEDURE — 83690 ASSAY OF LIPASE: CPT

## 2025-01-30 PROCEDURE — 96374 THER/PROPH/DIAG INJ IV PUSH: CPT

## 2025-01-30 PROCEDURE — 96361 HYDRATE IV INFUSION ADD-ON: CPT

## 2025-01-30 PROCEDURE — 63600175 PHARM REV CODE 636 W HCPCS

## 2025-01-30 PROCEDURE — 85610 PROTHROMBIN TIME: CPT | Performed by: EMERGENCY MEDICINE

## 2025-01-30 PROCEDURE — 83880 ASSAY OF NATRIURETIC PEPTIDE: CPT

## 2025-01-30 PROCEDURE — 25000003 PHARM REV CODE 250

## 2025-01-30 PROCEDURE — 86920 COMPATIBILITY TEST SPIN: CPT

## 2025-01-30 PROCEDURE — 36430 TRANSFUSION BLD/BLD COMPNT: CPT

## 2025-01-30 RX ORDER — CYANOCOBALAMIN (VITAMIN B-12) 250 MCG
500 TABLET ORAL DAILY
Status: DISCONTINUED | OUTPATIENT
Start: 2025-01-31 | End: 2025-02-03 | Stop reason: HOSPADM

## 2025-01-30 RX ORDER — METHOCARBAMOL 500 MG/1
500 TABLET, FILM COATED ORAL 4 TIMES DAILY PRN
Status: DISCONTINUED | OUTPATIENT
Start: 2025-01-30 | End: 2025-02-03 | Stop reason: HOSPADM

## 2025-01-30 RX ORDER — PRAZOSIN HYDROCHLORIDE 5 MG/1
5 CAPSULE ORAL NIGHTLY
Status: DISCONTINUED | OUTPATIENT
Start: 2025-01-30 | End: 2025-02-03 | Stop reason: HOSPADM

## 2025-01-30 RX ORDER — GLUCAGON 1 MG
1 KIT INJECTION
Status: DISCONTINUED | OUTPATIENT
Start: 2025-01-30 | End: 2025-02-03 | Stop reason: HOSPADM

## 2025-01-30 RX ORDER — PANTOPRAZOLE SODIUM 40 MG/10ML
40 INJECTION, POWDER, LYOPHILIZED, FOR SOLUTION INTRAVENOUS 2 TIMES DAILY
Status: DISCONTINUED | OUTPATIENT
Start: 2025-01-31 | End: 2025-02-03 | Stop reason: HOSPADM

## 2025-01-30 RX ORDER — PANTOPRAZOLE SODIUM 40 MG/10ML
80 INJECTION, POWDER, LYOPHILIZED, FOR SOLUTION INTRAVENOUS
Status: COMPLETED | OUTPATIENT
Start: 2025-01-30 | End: 2025-01-30

## 2025-01-30 RX ORDER — FERROUS SULFATE 300 MG/5ML
300 LIQUID (ML) ORAL DAILY
Status: DISCONTINUED | OUTPATIENT
Start: 2025-01-31 | End: 2025-02-03 | Stop reason: HOSPADM

## 2025-01-30 RX ORDER — IBUPROFEN 200 MG
16 TABLET ORAL
Status: DISCONTINUED | OUTPATIENT
Start: 2025-01-30 | End: 2025-02-03 | Stop reason: HOSPADM

## 2025-01-30 RX ORDER — OLANZAPINE 2.5 MG/1
1 TABLET ORAL NIGHTLY
COMMUNITY

## 2025-01-30 RX ORDER — PANTOPRAZOLE SODIUM 40 MG/1
40 TABLET, DELAYED RELEASE ORAL 2 TIMES DAILY
Status: ON HOLD | COMMUNITY
Start: 2024-10-23 | End: 2025-02-03

## 2025-01-30 RX ORDER — OLANZAPINE 2.5 MG/1
2.5 TABLET ORAL NIGHTLY
Status: DISCONTINUED | OUTPATIENT
Start: 2025-01-30 | End: 2025-01-31

## 2025-01-30 RX ORDER — ACETAMINOPHEN 325 MG/1
650 TABLET ORAL EVERY 4 HOURS PRN
Status: DISCONTINUED | OUTPATIENT
Start: 2025-01-30 | End: 2025-02-03 | Stop reason: HOSPADM

## 2025-01-30 RX ORDER — FLUOXETINE 10 MG/1
10 CAPSULE ORAL DAILY
Status: DISCONTINUED | OUTPATIENT
Start: 2025-01-31 | End: 2025-01-30

## 2025-01-30 RX ORDER — HYDROCODONE BITARTRATE AND ACETAMINOPHEN 500; 5 MG/1; MG/1
TABLET ORAL
Status: DISCONTINUED | OUTPATIENT
Start: 2025-01-30 | End: 2025-02-03 | Stop reason: HOSPADM

## 2025-01-30 RX ORDER — IBUPROFEN 200 MG
24 TABLET ORAL
Status: DISCONTINUED | OUTPATIENT
Start: 2025-01-30 | End: 2025-02-03 | Stop reason: HOSPADM

## 2025-01-30 RX ORDER — OXCARBAZEPINE 300 MG/1
1 TABLET, FILM COATED ORAL 2 TIMES DAILY
COMMUNITY

## 2025-01-30 RX ORDER — ONDANSETRON HYDROCHLORIDE 2 MG/ML
4 INJECTION, SOLUTION INTRAVENOUS EVERY 12 HOURS PRN
Status: DISCONTINUED | OUTPATIENT
Start: 2025-01-30 | End: 2025-02-03 | Stop reason: HOSPADM

## 2025-01-30 RX ORDER — FERROUS SULFATE 300 MG/5ML
300 LIQUID (ML) ORAL DAILY
COMMUNITY
Start: 2024-09-30

## 2025-01-30 RX ORDER — VIT C/E/ZN/COPPR/LUTEIN/ZEAXAN 250MG-90MG
500 CAPSULE ORAL DAILY
COMMUNITY

## 2025-01-30 RX ORDER — OXCARBAZEPINE 150 MG/1
300 TABLET, FILM COATED ORAL 2 TIMES DAILY
Status: DISCONTINUED | OUTPATIENT
Start: 2025-01-30 | End: 2025-02-03 | Stop reason: HOSPADM

## 2025-01-30 RX ADMIN — SODIUM CHLORIDE 1000 ML: 9 INJECTION, SOLUTION INTRAVENOUS at 04:01

## 2025-01-30 RX ADMIN — IOHEXOL 100 ML: 350 INJECTION, SOLUTION INTRAVENOUS at 06:01

## 2025-01-30 RX ADMIN — PANTOPRAZOLE SODIUM 80 MG: 40 INJECTION, POWDER, LYOPHILIZED, FOR SOLUTION INTRAVENOUS at 04:01

## 2025-01-30 NOTE — ED NOTES
I-STAT Chem-8+ Results:   Value Reference Range   Sodium 142 136-145 mmol/L   Potassium  3.9 3.5-5.1 mmol/L   Chloride 111  mmol/L   Ionized Calcium 1.15 1.06-1.42 mmol/L   CO2 (measured) 21 23-29 mmol/L   Glucose 94  mg/dL   BUN 9 6-30 mg/dL   Creatinine 0.9 0.5-1.4 mg/dL   Hematocrit <15 36-54%

## 2025-01-30 NOTE — ED TRIAGE NOTES
Janay Mathis, a 44 y.o. female presents to the ED w/ complaint of low hemoglobin that started a few weeks ago. Patient states that she has been lightheaded and nauseous.     Triage note:  Chief Complaint   Patient presents with    Abnormal Lab    Rectal Bleeding     Think hgb is low again, rectal bleeding again, has had numerous transfusions     Review of patient's allergies indicates:   Allergen Reactions    Tramadol Itching and Swelling     No past medical history on file.

## 2025-01-30 NOTE — ED PROVIDER NOTES
Encounter Date: 1/30/2025       History     Chief Complaint   Patient presents with    Abnormal Lab    Rectal Bleeding     Think hgb is low again, rectal bleeding again, has had numerous transfusions     44-year-old female with past medical history significant for anemia requiring blood transfusion and bipolar disorder presents for evaluation of rectal bleeding which has persisted for the last several weeks.  She reports daily bloody stools.  She notes an associated fatigue, lightheadedness/near syncope, and shortness of breath.  She states symptoms worsen with exertion.  No fever, chills, chest pain, nausea, abdominal pain.  Patient has a history of similar symptoms that have required numerous blood transfusions in the past    The history is provided by the patient.     Review of patient's allergies indicates:   Allergen Reactions    Tramadol Itching and Swelling     History reviewed. No pertinent past medical history.  History reviewed. No pertinent surgical history.  No family history on file.  Social History     Tobacco Use    Smoking status: Never    Smokeless tobacco: Never     Review of Systems    Physical Exam     Initial Vitals [01/30/25 1218]   BP Pulse Resp Temp SpO2   (!) 148/72 106 20 98.5 °F (36.9 °C) 100 %      MAP       --         Physical Exam    Nursing note and vitals reviewed.  Constitutional: She appears well-developed and well-nourished. No distress.   HENT:   Head: Normocephalic and atraumatic.   Eyes: EOM are normal. Pupils are equal, round, and reactive to light. No scleral icterus.   Conjunctival pallor   Neck: Neck supple.   Normal range of motion.  Cardiovascular:  Regular rhythm and intact distal pulses.   Tachycardia present.         No murmur heard.  Pulmonary/Chest: Breath sounds normal. No respiratory distress. She has no wheezes. She has no rales.   Abdominal: Abdomen is soft. She exhibits no distension. There is no abdominal tenderness.   Musculoskeletal:         General: No edema.  Normal range of motion.      Cervical back: Normal range of motion and neck supple.     Neurological: She is alert and oriented to person, place, and time.   Skin: Skin is warm and dry. There is pallor.         ED Course   Procedures  Labs Reviewed   CBC W/ AUTO DIFFERENTIAL - Abnormal       Result Value    WBC 7.86      RBC 2.06 (*)     Hemoglobin 3.9 (*)     Hematocrit 14.8 (*)     MCV 72 (*)     MCH 18.9 (*)     MCHC 26.4 (*)     RDW 24.1 (*)     Platelets 144 (*)     MPV SEE COMMENT      Immature Granulocytes 0.4      Gran # (ANC) 6.1      Immature Grans (Abs) 0.03      Lymph # 1.3      Mono # 0.4      Eos # 0.0      Baso # 0.02      nRBC 0      Gran % 77.3 (*)     Lymph % 16.0 (*)     Mono % 5.6      Eosinophil % 0.4      Basophil % 0.3      Platelet Estimate Decreased (*)     Aniso Slight      Poik Slight      Poly Occasional      Hypo Occasional      Ovalocytes Occasional      Tear Drop Cells Occasional      Spherocytes Occasional      Differential Method Automated      Narrative:     H&H   critical result(s) called and verbal readback obtained from   Aby Lorenzo RN. by [facility] 01/30/2025 18:18   COMPREHENSIVE METABOLIC PANEL - Abnormal    Sodium 141      Potassium 3.9      Chloride 113 (*)     CO2 21 (*)     Glucose 92      BUN 9      Creatinine 0.8      Calcium 8.9      Total Protein 7.0      Albumin 3.8      Total Bilirubin 0.4      Alkaline Phosphatase 92      AST 14      ALT 14      eGFR >60.0      Anion Gap 7 (*)    FERRITIN - Abnormal    Ferritin 7 (*)     Narrative:     ADD ON FERRITIN PER DR THIAGO MASTERS/ORDER# [id number]   IRON AND TIBC - Abnormal    Iron 193 (*)     Transferrin 299      TIBC 443      Saturated Iron 44     CBC W/ AUTO DIFFERENTIAL - Abnormal    WBC 12.20      RBC 2.63 (*)     Hemoglobin 5.9 (*)     Hematocrit 20.5 (*)     MCV 78 (*)     MCH 22.4 (*)     MCHC 28.8 (*)     RDW 24.1 (*)     Platelets 111 (*)     MPV SEE COMMENT      Immature Granulocytes 0.7 (*)     Gran # (ANC) 10.6 (*)      Immature Grans (Abs) 0.08 (*)     Lymph # 1.0      Mono # 0.5      Eos # 0.0      Baso # 0.04      nRBC 0      Gran % 86.5 (*)     Lymph % 8.4 (*)     Mono % 3.8 (*)     Eosinophil % 0.3      Basophil % 0.3      Differential Method Automated      Narrative:     hgb    critical result(s) called and verbal readback obtained from   MD leandra by MRV1 01/31/2025 07:52   COMPREHENSIVE METABOLIC PANEL - Abnormal    Sodium 140      Potassium 3.8      Chloride 113 (*)     CO2 19 (*)     Glucose 98      BUN 10      Creatinine 0.8      Calcium 7.9 (*)     Total Protein 6.0      Albumin 3.3 (*)     Total Bilirubin 2.4 (*)     Alkaline Phosphatase 79      AST 14      ALT 13      eGFR >60.0      Anion Gap 8     INFLUENZA A & B BY MOLECULAR   HEPATITIS C ANTIBODY    Hepatitis C Ab Non-reactive      Narrative:     Release to patient->Immediate   HIV 1 / 2 ANTIBODY    HIV 1/2 Ag/Ab Non-reactive      Narrative:     Release to patient->Immediate   PROTIME-INR    Prothrombin Time 10.9      INR 1.0     APTT    aPTT 21.1     B-TYPE NATRIURETIC PEPTIDE    BNP 38     TROPONIN I HIGH SENSITIVITY    Troponin I High Sensitivity <3     LIPASE    Lipase 18     MAGNESIUM    Magnesium 1.9     FERRITIN   FOLATE   VITAMIN B12   VITAMIN B12    Vitamin B-12 639      Narrative:     ADD ON VITAMIN B12 AND FOLATE PER DR THIAGO MASTERS/ORDER# [id number]   AND [id number]      Release to patient->Immediate   FOLATE    Folate 11.6      Narrative:     ADD ON VITAMIN B12 AND FOLATE PER DR THIAGO MASTERS/ORDER# [id number]   AND [id number]      Release to patient->Immediate   MAGNESIUM    Magnesium 1.8     PHOSPHATIDYLETHANOL (PETH)   CBC W/ AUTO DIFFERENTIAL   POCT URINE PREGNANCY    POC Preg Test, Ur Negative       Acceptable Yes     TYPE & SCREEN    Group & Rh O POS      Indirect Kavon NEG      Specimen Outdate 02/02/2025 23:59     ISTAT CHEM8   PREPARE RBC SOFT    UNIT NUMBER [id number]      Product Code [id number]      DISPENSE  STATUS ISSUED      CODING SYSTEM DYQF346      Unit Blood Type Code 5100      Unit Blood Type O POS      Unit Expiration [id number]      CROSSMATCH INTERPRETATION Compatible      UNIT NUMBER [id number]      Product Code P2875D53      DISPENSE STATUS TRANSFUSED      CODING SYSTEM JASN333      Unit Blood Type Code 5100      Unit Blood Type O POS      Unit Expiration [id number]      CROSSMATCH INTERPRETATION Compatible      UNIT NUMBER [id number]      Product Code J4615W29      DISPENSE STATUS ISSUED      CODING SYSTEM ZZOB832      Unit Blood Type Code 5100      Unit Blood Type O POS      Unit Expiration [id number]      CROSSMATCH INTERPRETATION Compatible     PREPARE RBC SOFT        ECG Results              EKG 12-lead (Final result)        Collection Time Result Time QRS Duration OHS QTC Calculation    01/30/25 16:36:37 01/31/25 08:12:35 98 472                     Final result by Interface, Lab In [facility] (01/31/25 08:12:44)                   Narrative:    Test Reason : K92.2,    Vent. Rate : 100 BPM     Atrial Rate : 100 BPM     P-R Int : 136 ms          QRS Dur :  98 ms      QT Int : 366 ms       P-R-T Axes :  58  -3  20 degrees    QTcB Int : 472 ms    Normal sinus rhythm  Possible Left atrial enlargement  Incomplete right bundle branch block  Abnormal R wave progression in the precordial leads  Cannot rule out Anterior infarct (cited on or before 30-Jan-2025)  Abnormal ECG  When compared with ECG of 30-Jan-2025 12:22,  No significant change was found  Confirmed by Bharathi Corrigan ([id number]) on 1/31/2025 8:12:30 AM    Referred By: AAAREFERRAL SELF           Confirmed By: Bharathi Corrigan                                     EKG 12-lead (Final result)        Collection Time Result Time QRS Duration OHS QTC Calculation    01/30/25 12:22:42 01/30/25 19:51:12 108 472                     Final result by Interface, Lab In [facility] (01/30/25 19:51:21)                   Narrative:    Test Reason : R06.02,    Vent. Rate :   99 BPM     Atrial Rate :  99 BPM     P-R Int : 142 ms          QRS Dur : 108 ms      QT Int : 368 ms       P-R-T Axes :  59 -25  23 degrees    QTcB Int : 472 ms    Normal sinus rhythm  Possible Left atrial enlargement  Incomplete right bundle branch block  Possible Anterior infarct ,age undetermined  Abnormal ECG  When compared with ECG of 21-Mar-2023 12:00,  No significant change was found  Confirmed by Kerri Clark ([id number]) on 1/30/2025 7:51:04 PM    Referred By:            Confirmed By: Kerri Clark                                  Imaging Results              CTA Acute GI Bleed, Abdomen and Pelvis (Final result)  Result time 01/30/25 18:45:37      Final result by Juan Roberts MD (01/30/25 18:45:37)                   Impression:      1. No contrast extravasation into bowel or elsewhere to indicate site of active gastrointestinal hemorrhage.  2. No findings to suggest obstructive uropathy.  3. Please see above for several additional findings.      Electronically signed by: Juan Roberts MD  Date:    01/30/2025  Time:    18:45               Narrative:    EXAMINATION:  CTA ACUTE GI BLEED, ABDOMEN AND PELVIS    CLINICAL HISTORY:  rectal bleeding;    TECHNIQUE:  Axial images of the abdomen and pelvis were obtained pre and post the administration of 100 cc omni 350 IV contrast following the CTA acute GI bleed abdomen and pelvis protocol.  Pre contrast and postcontrast axial images were reviewed as well as coronal and sagittal reformatted images and MIPS reformatted images.    COMPARISON:  None    FINDINGS:  Images of the lower thorax are remarkable for bilateral dependent atelectasis.    Allowing for bolus timing, the liver, spleen, pancreas, gallbladder and adrenal glands are grossly unremarkable.  The stomach is nondistended, no gastric wall thickening.  The portal vein, splenic vein and SMV all are patent.    The kidneys enhance symmetrically without hydronephrosis or nephrolithiasis.  The bilateral  ureters are unremarkable without calculi seen.  The urinary bladder is mildly distended without wall thickening.  The uterus is unremarkable.  The adnexa is unremarkable.    The distal large bowel is for the most part decompressed.  No contrast extravasation into the large bowel to suggest site of active gastrointestinal hemorrhage.  The terminal ileum and appendix are unremarkable.  No contrast extravasation into the gastric lumen or small bowel to suggest site of active gastrointestinal hemorrhage.  There are a few scattered shotty periaortic, pericaval, and mesenteric lymph nodes.  No focal organized pelvic fluid collection.    No acute osseous abnormality.  No significant inguinal lymphadenopathy.    Vascular details: The celiac axis, SMA, bilateral renal arteries, KERRY, and distal aorto iliac branches all are patent.  No aneurysm or dissection.                                       X-Ray Chest AP Portable (Final result)  Result time 01/30/25 16:20:43      Final result by Juan Roberts MD (01/30/25 16:20:43)                   Impression:      1. Interstitial findings may reflect edema noting accentuation by habitus.  No large focal consolidation.      Electronically signed by: Juan Roberts MD  Date:    01/30/2025  Time:    16:20               Narrative:    EXAMINATION:  XR CHEST AP PORTABLE    CLINICAL HISTORY:  shortness of breath;    TECHNIQUE:  Single frontal view of the chest was performed.    COMPARISON:  None    FINDINGS:  The cardiomediastinal silhouette is not enlarged, magnified by technique.  There is no pleural effusion.  The trachea is midline.  The lungs are symmetrically expanded bilaterally with mildly coarse central hilar interstitial attenuation accentuated by habitus..  No large focal consolidation seen.  There is no pneumothorax.  The osseous structures are remarkable for levo scoliotic curvature of the spine..                                       Medications   0.9%  NaCl infusion  (for blood administration) (has no administration in time range)   ferrous sulfate 300 mg (60 mg iron)/5 mL syrup 300 mg (has no administration in time range)   cyanocobalamin tablet 500 mcg (has no administration in time range)   pantoprazole injection 40 mg (has no administration in time range)   ondansetron injection 4 mg (has no administration in time range)   acetaminophen tablet 650 mg (has no administration in time range)   glucose chewable tablet 16 g (has no administration in time range)   glucose chewable tablet 24 g (has no administration in time range)   dextrose 50% injection 12.5 g (has no administration in time range)   dextrose 50% injection 25 g (has no administration in time range)   glucagon (human recombinant) injection 1 mg (has no administration in time range)   methocarbamoL tablet 500 mg (has no administration in time range)   OXcarbazepine tablet 300 mg (0 mg Oral Hold 1/30/25 2115)   prazosin capsule 5 mg (0 mg Oral Hold 1/30/25 2115)   OLANZapine tablet 2.5 mg (2.5 mg Oral Given 1/31/25 0616)   oxyCODONE immediate release tablet 5 mg (has no administration in time range)   0.9%  NaCl infusion (for blood administration) (has no administration in time range)   pantoprazole injection 80 mg (80 mg Intravenous Given 1/30/25 1657)   sodium chloride 0.9% bolus 1,000 mL 1,000 mL (0 mLs Intravenous Stopped 1/30/25 1750)   iohexoL (OMNIPAQUE 350) injection 100 mL (100 mLs Intravenous Given 1/30/25 1801)     Medical Decision Making  Patient presents for rectal bleeding and shortness of breath.  Differential diagnosis includes but is not limited to:  ACS, arrhythmia, electrolyte abnormality, COVID, flu, pneumonia, gastritis, peptic ulcer, pancreatitis, colitis, diverticulitis, AVM, hemorrhoidal bleed. Pt without acute distress upon evaluation in the ED. Afebrile and hemodynamically stable. EKG without ST elevation. Trop negative. Hemoglobin 3.9. CTA GI bleed obtained without active extrav. Pt  transfused in the ED. Case discussed with hospital medicine who plan for admission.     Amount and/or Complexity of Data Reviewed  Labs: ordered.  Radiology: ordered.    Risk  Prescription drug management.  Decision regarding hospitalization.               ED Course as of 01/31/25 0839   Thu Jan 30, 2025   1514 Hx of bipolar disorder, recurrent admissions for acute blood loss anemia suspected 2/2 to menorrhagia or GI bleeding [NN]   1549 EKG with sinus rhythm, rate 99, incomplete right bundle-branch block, similar to prior, no STEMI [NN]   1556 October 8th 2024 - had EGD and colonoscopy that showed internal hemorrhoids without bleeding and gastric antral masses that were biopsied [NN]   1557 Here with blood in stools, generalized weakness and fatigue and sob. Last menstrual period was from 1/15-1/20 and normal/light, denies heavy vaginal bleeding [NN]      ED Course User Index  [NN] Karin Wyatt MD                           Clinical Impression:  Final diagnoses:  [R06.02] SOB (shortness of breath)  [K92.2] GI bleed  [D64.9] Anemia          ED Disposition Condition    Admit                 Pasha Minaya Jr., MD  Resident  01/31/25 0839

## 2025-01-31 ENCOUNTER — ANESTHESIA EVENT (OUTPATIENT)
Dept: ENDOSCOPY | Facility: HOSPITAL | Age: 45
DRG: 394 | End: 2025-01-31
Payer: MEDICAID

## 2025-01-31 LAB
ALBUMIN SERPL BCP-MCNC: 3.3 G/DL (ref 3.5–5.2)
ALP SERPL-CCNC: 79 U/L (ref 40–150)
ALT SERPL W/O P-5'-P-CCNC: 13 U/L (ref 10–44)
ANION GAP SERPL CALC-SCNC: 8 MMOL/L (ref 8–16)
AST SERPL-CCNC: 14 U/L (ref 10–40)
BASOPHILS # BLD AUTO: 0.03 K/UL (ref 0–0.2)
BASOPHILS # BLD AUTO: 0.03 K/UL (ref 0–0.2)
BASOPHILS # BLD AUTO: 0.04 K/UL (ref 0–0.2)
BASOPHILS # BLD AUTO: 0.04 K/UL (ref 0–0.2)
BASOPHILS NFR BLD: 0.3 % (ref 0–1.9)
BILIRUB SERPL-MCNC: 2.4 MG/DL (ref 0.1–1)
BLD PROD TYP BPU: NORMAL
BLOOD UNIT EXPIRATION DATE: NORMAL
BLOOD UNIT TYPE CODE: 5100
BLOOD UNIT TYPE: NORMAL
BUN SERPL-MCNC: 10 MG/DL (ref 6–20)
BUN SERPL-MCNC: 9 MG/DL (ref 6–30)
CALCIUM SERPL-MCNC: 7.9 MG/DL (ref 8.7–10.5)
CHLORIDE SERPL-SCNC: 111 MMOL/L (ref 95–110)
CHLORIDE SERPL-SCNC: 113 MMOL/L (ref 95–110)
CO2 SERPL-SCNC: 19 MMOL/L (ref 23–29)
CODING SYSTEM: NORMAL
CREAT SERPL-MCNC: 0.8 MG/DL (ref 0.5–1.4)
CREAT SERPL-MCNC: 0.9 MG/DL (ref 0.5–1.4)
CROSSMATCH INTERPRETATION: NORMAL
DIFFERENTIAL METHOD BLD: ABNORMAL
DISPENSE STATUS: NORMAL
EOSINOPHIL # BLD AUTO: 0 K/UL (ref 0–0.5)
EOSINOPHIL # BLD AUTO: 0 K/UL (ref 0–0.5)
EOSINOPHIL # BLD AUTO: 0.1 K/UL (ref 0–0.5)
EOSINOPHIL # BLD AUTO: 0.1 K/UL (ref 0–0.5)
EOSINOPHIL NFR BLD: 0.3 % (ref 0–8)
EOSINOPHIL NFR BLD: 0.3 % (ref 0–8)
EOSINOPHIL NFR BLD: 0.5 % (ref 0–8)
EOSINOPHIL NFR BLD: 0.8 % (ref 0–8)
ERYTHROCYTE [DISTWIDTH] IN BLOOD BY AUTOMATED COUNT: 20.8 % (ref 11.5–14.5)
ERYTHROCYTE [DISTWIDTH] IN BLOOD BY AUTOMATED COUNT: 22 % (ref 11.5–14.5)
ERYTHROCYTE [DISTWIDTH] IN BLOOD BY AUTOMATED COUNT: 23.9 % (ref 11.5–14.5)
ERYTHROCYTE [DISTWIDTH] IN BLOOD BY AUTOMATED COUNT: 24.1 % (ref 11.5–14.5)
EST. GFR  (NO RACE VARIABLE): >60 ML/MIN/1.73 M^2
GLUCOSE SERPL-MCNC: 94 MG/DL (ref 70–110)
GLUCOSE SERPL-MCNC: 98 MG/DL (ref 70–110)
HCT VFR BLD AUTO: 19.4 % (ref 37–48.5)
HCT VFR BLD AUTO: 20.5 % (ref 37–48.5)
HCT VFR BLD AUTO: 22.5 % (ref 37–48.5)
HCT VFR BLD AUTO: 27.6 % (ref 37–48.5)
HCT VFR BLD CALC: <15 %PCV (ref 36–54)
HGB BLD-MCNC: 5.5 G/DL (ref 12–16)
HGB BLD-MCNC: 5.9 G/DL (ref 12–16)
HGB BLD-MCNC: 6.7 G/DL (ref 12–16)
HGB BLD-MCNC: 8.5 G/DL (ref 12–16)
IMM GRANULOCYTES # BLD AUTO: 0.03 K/UL (ref 0–0.04)
IMM GRANULOCYTES # BLD AUTO: 0.04 K/UL (ref 0–0.04)
IMM GRANULOCYTES # BLD AUTO: 0.05 K/UL (ref 0–0.04)
IMM GRANULOCYTES # BLD AUTO: 0.08 K/UL (ref 0–0.04)
IMM GRANULOCYTES NFR BLD AUTO: 0.3 % (ref 0–0.5)
IMM GRANULOCYTES NFR BLD AUTO: 0.4 % (ref 0–0.5)
IMM GRANULOCYTES NFR BLD AUTO: 0.4 % (ref 0–0.5)
IMM GRANULOCYTES NFR BLD AUTO: 0.7 % (ref 0–0.5)
IRON SERPL-MCNC: 193 UG/DL (ref 30–160)
LYMPHOCYTES # BLD AUTO: 0.7 K/UL (ref 1–4.8)
LYMPHOCYTES # BLD AUTO: 0.9 K/UL (ref 1–4.8)
LYMPHOCYTES # BLD AUTO: 1 K/UL (ref 1–4.8)
LYMPHOCYTES # BLD AUTO: 1 K/UL (ref 1–4.8)
LYMPHOCYTES NFR BLD: 6.2 % (ref 18–48)
LYMPHOCYTES NFR BLD: 7.9 % (ref 18–48)
LYMPHOCYTES NFR BLD: 8.4 % (ref 18–48)
LYMPHOCYTES NFR BLD: 9.3 % (ref 18–48)
MAGNESIUM SERPL-MCNC: 1.8 MG/DL (ref 1.6–2.6)
MCH RBC QN AUTO: 22.4 PG (ref 27–31)
MCH RBC QN AUTO: 22.4 PG (ref 27–31)
MCH RBC QN AUTO: 23.3 PG (ref 27–31)
MCH RBC QN AUTO: 24.4 PG (ref 27–31)
MCHC RBC AUTO-ENTMCNC: 28.4 G/DL (ref 32–36)
MCHC RBC AUTO-ENTMCNC: 28.8 G/DL (ref 32–36)
MCHC RBC AUTO-ENTMCNC: 29.8 G/DL (ref 32–36)
MCHC RBC AUTO-ENTMCNC: 30.8 G/DL (ref 32–36)
MCV RBC AUTO: 78 FL (ref 82–98)
MCV RBC AUTO: 78 FL (ref 82–98)
MCV RBC AUTO: 79 FL (ref 82–98)
MCV RBC AUTO: 79 FL (ref 82–98)
MONOCYTES # BLD AUTO: 0.5 K/UL (ref 0.3–1)
MONOCYTES # BLD AUTO: 0.6 K/UL (ref 0.3–1)
MONOCYTES # BLD AUTO: 0.7 K/UL (ref 0.3–1)
MONOCYTES # BLD AUTO: 0.7 K/UL (ref 0.3–1)
MONOCYTES NFR BLD: 3.8 % (ref 4–15)
MONOCYTES NFR BLD: 5.2 % (ref 4–15)
MONOCYTES NFR BLD: 5.6 % (ref 4–15)
MONOCYTES NFR BLD: 6.7 % (ref 4–15)
NEUTROPHILS # BLD AUTO: 10.3 K/UL (ref 1.8–7.7)
NEUTROPHILS # BLD AUTO: 10.6 K/UL (ref 1.8–7.7)
NEUTROPHILS # BLD AUTO: 11.2 K/UL (ref 1.8–7.7)
NEUTROPHILS # BLD AUTO: 8 K/UL (ref 1.8–7.7)
NEUTROPHILS NFR BLD: 83 % (ref 38–73)
NEUTROPHILS NFR BLD: 85 % (ref 38–73)
NEUTROPHILS NFR BLD: 86.5 % (ref 38–73)
NEUTROPHILS NFR BLD: 87.5 % (ref 38–73)
NRBC BLD-RTO: 0 /100 WBC
OHS QRS DURATION: 98 MS
OHS QTC CALCULATION: 472 MS
PLATELET # BLD AUTO: 108 K/UL (ref 150–450)
PLATELET # BLD AUTO: 110 K/UL (ref 150–450)
PLATELET # BLD AUTO: 111 K/UL (ref 150–450)
PLATELET # BLD AUTO: 134 K/UL (ref 150–450)
PMV BLD AUTO: ABNORMAL FL (ref 9.2–12.9)
POC IONIZED CALCIUM: 1.15 MMOL/L (ref 1.06–1.42)
POC TCO2 (MEASURED): 21 MMOL/L (ref 23–29)
POTASSIUM BLD-SCNC: 3.9 MMOL/L (ref 3.5–5.1)
POTASSIUM SERPL-SCNC: 3.8 MMOL/L (ref 3.5–5.1)
PROT SERPL-MCNC: 6 G/DL (ref 6–8.4)
RBC # BLD AUTO: 2.46 M/UL (ref 4–5.4)
RBC # BLD AUTO: 2.63 M/UL (ref 4–5.4)
RBC # BLD AUTO: 2.87 M/UL (ref 4–5.4)
RBC # BLD AUTO: 3.48 M/UL (ref 4–5.4)
SAMPLE: ABNORMAL
SATURATED IRON: 44 % (ref 20–50)
SODIUM BLD-SCNC: 142 MMOL/L (ref 136–145)
SODIUM SERPL-SCNC: 140 MMOL/L (ref 136–145)
TOTAL IRON BINDING CAPACITY: 443 UG/DL (ref 250–450)
TRANS ERYTHROCYTES VOL PATIENT: NORMAL ML
TRANSFERRIN SERPL-MCNC: 299 MG/DL (ref 200–375)
WBC # BLD AUTO: 11.7 K/UL (ref 3.9–12.7)
WBC # BLD AUTO: 12.2 K/UL (ref 3.9–12.7)
WBC # BLD AUTO: 13.12 K/UL (ref 3.9–12.7)
WBC # BLD AUTO: 9.65 K/UL (ref 3.9–12.7)

## 2025-01-31 PROCEDURE — 99223 1ST HOSP IP/OBS HIGH 75: CPT | Mod: ,,, | Performed by: INTERNAL MEDICINE

## 2025-01-31 PROCEDURE — P9021 RED BLOOD CELLS UNIT: HCPCS

## 2025-01-31 PROCEDURE — 63600175 PHARM REV CODE 636 W HCPCS: Performed by: HOSPITALIST

## 2025-01-31 PROCEDURE — 85025 COMPLETE CBC W/AUTO DIFF WBC: CPT | Performed by: NURSE PRACTITIONER

## 2025-01-31 PROCEDURE — 21400001 HC TELEMETRY ROOM

## 2025-01-31 PROCEDURE — 86920 COMPATIBILITY TEST SPIN: CPT | Performed by: STUDENT IN AN ORGANIZED HEALTH CARE EDUCATION/TRAINING PROGRAM

## 2025-01-31 PROCEDURE — 80321 ALCOHOLS BIOMARKERS 1OR 2: CPT | Performed by: NURSE PRACTITIONER

## 2025-01-31 PROCEDURE — 83735 ASSAY OF MAGNESIUM: CPT | Performed by: NURSE PRACTITIONER

## 2025-01-31 PROCEDURE — P9021 RED BLOOD CELLS UNIT: HCPCS | Performed by: STUDENT IN AN ORGANIZED HEALTH CARE EDUCATION/TRAINING PROGRAM

## 2025-01-31 PROCEDURE — 25000003 PHARM REV CODE 250: Performed by: NURSE PRACTITIONER

## 2025-01-31 PROCEDURE — 80053 COMPREHEN METABOLIC PANEL: CPT | Performed by: NURSE PRACTITIONER

## 2025-01-31 PROCEDURE — 36415 COLL VENOUS BLD VENIPUNCTURE: CPT | Performed by: NURSE PRACTITIONER

## 2025-01-31 PROCEDURE — 83540 ASSAY OF IRON: CPT | Performed by: NURSE PRACTITIONER

## 2025-01-31 RX ORDER — HYDROCODONE BITARTRATE AND ACETAMINOPHEN 500; 5 MG/1; MG/1
TABLET ORAL
Status: DISCONTINUED | OUTPATIENT
Start: 2025-01-31 | End: 2025-02-03 | Stop reason: HOSPADM

## 2025-01-31 RX ORDER — MORPHINE SULFATE 4 MG/ML
4 INJECTION, SOLUTION INTRAMUSCULAR; INTRAVENOUS EVERY 6 HOURS PRN
Status: DISCONTINUED | OUTPATIENT
Start: 2025-01-31 | End: 2025-01-31

## 2025-01-31 RX ORDER — OLANZAPINE 2.5 MG/1
2.5 TABLET ORAL NIGHTLY
Status: DISCONTINUED | OUTPATIENT
Start: 2025-01-31 | End: 2025-02-03 | Stop reason: HOSPADM

## 2025-01-31 RX ORDER — OXYCODONE HYDROCHLORIDE 5 MG/1
5 TABLET ORAL EVERY 6 HOURS PRN
Status: DISCONTINUED | OUTPATIENT
Start: 2025-01-31 | End: 2025-02-03 | Stop reason: HOSPADM

## 2025-01-31 RX ORDER — POLYETHYLENE GLYCOL 3350, SODIUM SULFATE ANHYDROUS, SODIUM BICARBONATE, SODIUM CHLORIDE, POTASSIUM CHLORIDE 236; 22.74; 6.74; 5.86; 2.97 G/4L; G/4L; G/4L; G/4L; G/4L
4000 POWDER, FOR SOLUTION ORAL ONCE
Status: DISCONTINUED | OUTPATIENT
Start: 2025-02-02 | End: 2025-01-31

## 2025-01-31 RX ORDER — POLYETHYLENE GLYCOL 3350, SODIUM SULFATE ANHYDROUS, SODIUM BICARBONATE, SODIUM CHLORIDE, POTASSIUM CHLORIDE 236; 22.74; 6.74; 5.86; 2.97 G/4L; G/4L; G/4L; G/4L; G/4L
4000 POWDER, FOR SOLUTION ORAL ONCE
Status: COMPLETED | OUTPATIENT
Start: 2025-02-02 | End: 2025-02-02

## 2025-01-31 RX ADMIN — MORPHINE SULFATE 4 MG: 4 INJECTION INTRAVENOUS at 04:01

## 2025-01-31 RX ADMIN — OLANZAPINE 2.5 MG: 2.5 TABLET, FILM COATED ORAL at 06:01

## 2025-01-31 RX ADMIN — OXYCODONE 5 MG: 5 TABLET ORAL at 03:01

## 2025-01-31 NOTE — ASSESSMENT & PLAN NOTE
Patient's blood pressure range in the last 24 hours was: BP  Min: 101/54  Max: 141/67.The patient's inpatient anti-hypertensive regimen is listed below:  Current Antihypertensives  prazosin capsule 5 mg, Nightly, Oral    Plan  - BP is controlled, no changes needed to their regimen  - Not currently on antihypertensives, takes prazosin for PTSD.

## 2025-01-31 NOTE — ASSESSMENT & PLAN NOTE
Patient's hemorrhage is due to gastrointestinal bleed, patient does have a propensity for bleeding due to a platelet defect/deficiency.. Patients most recent Hgb, Hct, platelets, and INR are listed below.  Recent Labs     01/30/25  1708   HGB 3.9*   HCT 14.8*   *   INR 1.0     Plan  - Will trend hemoglobin/hematocrit Every 8 hours x24 hrs  - Will monitor and correct any coagulation defects  - Will transfuse if Hgb is <7g/dl (<8g/dl in cases of active ACS) or if patient has rapid bleeding leading to hemodynamic instability    - Started on GIB pathway.  - CLD, then NPO after midnight  - Received 80mg IV Protonix in the ED, continue 40mg IV BID  - Consult GI, appreciate recs.  - Use IV anti-emetics as needed.   - CTA abd/pelvis with no contrast extravasation into bowel or elsewhere to indicate site of active gastrointestinal hemorrhage.     -EGD 11/20/24 with GI resolution of prior masses/ulcers per notes but unable to view results. Biopsies again +HP  -EGD (10/8/24): 3.5cm anterior gastric antrum mass and 2cm posterior gastric antrum mass - both biopsies +HP without dysplasia  -Colonoscopy (10/8/24): internal hemorrhoids

## 2025-01-31 NOTE — HPI
Janay Mathis is a 44 y.o. female with a PMHx of anemia requiring multiple blood transfusions, AUB, h pylori gastritis, iron deficiency, vitamin B12 deficiency, HTN, depression, PTSD, and bipolar disorder who presents to the ED for evaluation of rectal bleeding for 3-4 weeks. Reports intermittent issues with rectal bleeding since October, noting it improves but never completely stops. Describes 1 episode of hematochezia daily, but has increasing frequency in the last few days where she is bleeding all day even when not using bathroom. Endorses fatigue, lightheadedness, generalized weakness, SOB worse with exertion, and near syncope over the past week. States this is typical for her when her blood counts drop too low. She denies abdominal pain, diarrhea, CP, fever/chills, cough, hematuria, or dysuria. She reports chronic, intermittent nausea and vomiting but no worsening from her baseline. Denies hematemesis.      Patient had a recent EGD/colonoscopy in October 2024, which showed 2 antrum masses biopsy + for H pylori and non bleeding hemorrhoids. Completed quadruple therapy. Patient recently presented [facility]/25 with symptomatic anemia (Hgb 4.6) and hematochezia and received 2u PRBCs and left AMA. Reports last iron infusion was 1/7.     In the ED, patient tachycardic otherwise vitals stable, afebrile. CBC with Hgb 3.9 and plt 144. PT/INR and PTT WNL. Type & screen obtained. CTA abd/pelvis with no contrast extravasation into bowel or elsewhere to indicate site of active gastrointestinal hemorrhage. The patient received 80mg IV protonix, 1L NS, and is currently received 3u PRBCs.

## 2025-01-31 NOTE — ED NOTES
"ED techAshkan, attempted twice to obtain a flu swab specimen from pt. Pt rejected on both tries stating, "I don't do those. I have not done those in my 44 years. I respectfully decline." I explained that she needed to be swabbed to go to endo, but pt said, "Then I just won't go." RN notified   "

## 2025-01-31 NOTE — SUBJECTIVE & OBJECTIVE
History reviewed. No pertinent past medical history.    History reviewed. No pertinent surgical history.    Review of patient's allergies indicates:   Allergen Reactions    Tramadol Itching and Swelling       No current facility-administered medications on file prior to encounter.     Current Outpatient Medications on File Prior to Encounter   Medication Sig    ferrous sulfate 300 mg (60 mg iron)/5 mL syrup Take 300 mg by mouth once daily.    pantoprazole (PROTONIX) 40 MG tablet Take 40 mg by mouth 2 (two) times daily.    cyanocobalamin 500 MCG tablet Take 500 mcg by mouth once daily.    FLUoxetine 10 MG capsule Take 10 mg by mouth.    OLANZapine (ZYPREXA) 2.5 MG tablet Take 1 tablet by mouth every evening.    OXcarbazepine (TRILEPTAL) 300 MG Tab Take 1 tablet by mouth 2 (two) times daily.    prazosin (MINIPRESS) 5 MG capsule Take 5 mg by mouth every evening.    zolpidem (AMBIEN) 5 MG Tab Take 5 mg by mouth nightly as needed (INSOMMNIA).     Family History    None       Tobacco Use    Smoking status: Never    Smokeless tobacco: Never   Substance and Sexual Activity    Alcohol use: Not on file    Drug use: Not on file    Sexual activity: Not on file     Review of Systems   Constitutional:  Positive for fatigue and fever. Negative for appetite change, chills and diaphoresis.   HENT:  Negative for congestion, rhinorrhea and sore throat.    Eyes:  Negative for photophobia and visual disturbance.   Respiratory:  Positive for shortness of breath. Negative for cough and wheezing.         +VENEGAS   Cardiovascular:  Negative for chest pain, palpitations and leg swelling.   Gastrointestinal:  Positive for blood in stool. Negative for abdominal distention, abdominal pain, diarrhea, nausea and vomiting.   Genitourinary:  Negative for dysuria, frequency, hematuria and vaginal bleeding.   Musculoskeletal:  Negative for gait problem, myalgias and neck pain.   Skin:  Negative for color change, pallor, rash and wound.   Neurological:   Positive for syncope (near syncope), weakness and light-headedness. Negative for headaches.   Psychiatric/Behavioral:  Negative for confusion and hallucinations. The patient is not nervous/anxious.      Objective:     Vital Signs (Most Recent):  Temp: 98.8 °F (37.1 °C) (01/30/25 1700)  Pulse: 89 (01/30/25 1845)  Resp: 18 (01/30/25 1845)  BP: 129/61 (01/30/25 1845)  SpO2: 100 % (01/30/25 1845) Vital Signs (24h Range):  Temp:  [98.5 °F (36.9 °C)-98.8 °F (37.1 °C)] 98.8 °F (37.1 °C)  Pulse:  [] 89  Resp:  [18-20] 18  SpO2:  [98 %-100 %] 100 %  BP: (129-148)/(61-72) 129/61     Weight: 81.6 kg (180 lb)  Body mass index is 34.01 kg/m².     Physical Exam  Vitals and nursing note reviewed.   Constitutional:       General: She is not in acute distress.     Appearance: She is obese. She is not toxic-appearing or diaphoretic.   HENT:      Head: Normocephalic and atraumatic.      Nose: Nose normal.      Mouth/Throat:      Mouth: Mucous membranes are moist.   Eyes:      Pupils: Pupils are equal, round, and reactive to light.   Cardiovascular:      Rate and Rhythm: Regular rhythm. Tachycardia present.      Pulses: Normal pulses.   Pulmonary:      Effort: Pulmonary effort is normal. No respiratory distress.      Breath sounds: No wheezing, rhonchi or rales.   Abdominal:      General: Bowel sounds are normal. There is no distension.      Palpations: Abdomen is soft.      Tenderness: There is no abdominal tenderness. There is no guarding.   Musculoskeletal:         General: Normal range of motion.      Cervical back: Normal range of motion.      Right lower leg: No edema.      Left lower leg: No edema.   Skin:     General: Skin is warm and dry.      Capillary Refill: Capillary refill takes less than 2 seconds.      Coloration: Skin is pale.   Neurological:      General: No focal deficit present.      Mental Status: She is alert and oriented to person, place, and time.      Sensory: No sensory deficit.      Motor: No weakness.    Psychiatric:         Attention and Perception: Attention normal.         Mood and Affect: Mood normal.         Speech: Speech normal.         Behavior: Behavior is cooperative.         Thought Content: Thought content normal.      Comments: Restless, rocking in bed intermittently.              CRANIAL NERVES     CN III, IV, VI   Pupils are equal, round, and reactive to light.       Significant Labs: All pertinent labs within the past 24 hours have been reviewed.  CBC:   Recent Labs   Lab 01/30/25  1708   WBC 7.86   HGB 3.9*   HCT 14.8*   *     CMP:   Recent Labs   Lab 01/30/25  1708      K 3.9   *   CO2 21*   GLU 92   BUN 9   CREATININE 0.8   CALCIUM 8.9   PROT 7.0   ALBUMIN 3.8   BILITOT 0.4   ALKPHOS 92   AST 14   ALT 14   ANIONGAP 7*     Cardiac Markers:   Recent Labs   Lab 01/30/25  1708   BNP 38     Coagulation:   Recent Labs   Lab 01/30/25  1708   INR 1.0   APTT 21.1     Lipase:   Recent Labs   Lab 01/30/25  1708   LIPASE 18     Magnesium:   Recent Labs   Lab 01/30/25  1708   MG 1.9     Troponin:   Recent Labs   Lab 01/30/25  1708   TROPONINIHS <3       Significant Imaging: I have reviewed all pertinent imaging results/findings within the past 24 hours.  Imaging Results              CTA Acute GI Bleed, Abdomen and Pelvis (Final result)  Result time 01/30/25 18:45:37      Final result by Juan Roberts MD (01/30/25 18:45:37)                   Impression:      1. No contrast extravasation into bowel or elsewhere to indicate site of active gastrointestinal hemorrhage.  2. No findings to suggest obstructive uropathy.  3. Please see above for several additional findings.      Electronically signed by: Juan Roberts MD  Date:    01/30/2025  Time:    18:45               Narrative:    EXAMINATION:  CTA ACUTE GI BLEED, ABDOMEN AND PELVIS    CLINICAL HISTORY:  rectal bleeding;    TECHNIQUE:  Axial images of the abdomen and pelvis were obtained pre and post the administration of 100 cc omni  350 IV contrast following the CTA acute GI bleed abdomen and pelvis protocol.  Pre contrast and postcontrast axial images were reviewed as well as coronal and sagittal reformatted images and MIPS reformatted images.    COMPARISON:  None    FINDINGS:  Images of the lower thorax are remarkable for bilateral dependent atelectasis.    Allowing for bolus timing, the liver, spleen, pancreas, gallbladder and adrenal glands are grossly unremarkable.  The stomach is nondistended, no gastric wall thickening.  The portal vein, splenic vein and SMV all are patent.    The kidneys enhance symmetrically without hydronephrosis or nephrolithiasis.  The bilateral ureters are unremarkable without calculi seen.  The urinary bladder is mildly distended without wall thickening.  The uterus is unremarkable.  The adnexa is unremarkable.    The distal large bowel is for the most part decompressed.  No contrast extravasation into the large bowel to suggest site of active gastrointestinal hemorrhage.  The terminal ileum and appendix are unremarkable.  No contrast extravasation into the gastric lumen or small bowel to suggest site of active gastrointestinal hemorrhage.  There are a few scattered shotty periaortic, pericaval, and mesenteric lymph nodes.  No focal organized pelvic fluid collection.    No acute osseous abnormality.  No significant inguinal lymphadenopathy.    Vascular details: The celiac axis, SMA, bilateral renal arteries, KERRY, and distal aorto iliac branches all are patent.  No aneurysm or dissection.                                       X-Ray Chest AP Portable (Final result)  Result time 01/30/25 16:20:43      Final result by Juan Roberts MD (01/30/25 16:20:43)                   Impression:      1. Interstitial findings may reflect edema noting accentuation by habitus.  No large focal consolidation.      Electronically signed by: Juan Roberts MD  Date:    01/30/2025  Time:    16:20               Narrative:     EXAMINATION:  XR CHEST AP PORTABLE    CLINICAL HISTORY:  shortness of breath;    TECHNIQUE:  Single frontal view of the chest was performed.    COMPARISON:  None    FINDINGS:  The cardiomediastinal silhouette is not enlarged, magnified by technique.  There is no pleural effusion.  The trachea is midline.  The lungs are symmetrically expanded bilaterally with mildly coarse central hilar interstitial attenuation accentuated by habitus..  No large focal consolidation seen.  There is no pneumothorax.  The osseous structures are remarkable for levo scoliotic curvature of the spine..

## 2025-01-31 NOTE — ED NOTES
Report received from VIJAY Badillo. Assume care of pt. Pt resting comfortably and independently repositioned in stretcher with bed locked in lowest position for safety. NAD noted at this time. Respirations even and unlabored and visible chest rise noted.  Patient offered bathroom assistance and denies need at this time. Pt instructed to call if assistance is needed. Pt on continuous cardiac, BP, and O2 monitoring. Call light within reach. No needs at this time. Will continue to monitor.

## 2025-01-31 NOTE — ED NOTES
"Pt was informed that she was on a clear liquid diet. Pt states " They can run up my discharge papers now. I've had a colonoscopy and a scope performed already and no one can find anything wrong, After this unit is done I'm going home today." I informed the pt that I would contact the physician and let them know.   "

## 2025-01-31 NOTE — PROGRESS NOTES
[facility] - Emergency Dept  Hospital Medicine  Progress Note    Patient Name: Janay Mathis  MRN: [id number]  Patient Class: IP- Inpatient   Admission Date: 1/30/2025  Length of Stay: 1 days  Attending Physician: Mc Howell,*  Primary Care Provider: Rahul Del Rio MD        Subjective     Principal Problem:Rectal bleeding        HPI:  Janay Mathis is a 44 y.o. female with a PMHx of anemia requiring multiple blood transfusions, AUB, h pylori gastritis, iron deficiency, vitamin B12 deficiency, HTN, depression, PTSD, and bipolar disorder who presents to the ED for evaluation of rectal bleeding for 3-4 weeks. Reports intermittent issues with rectal bleeding since October, noting it improves but never completely stops. Describes 1 episode of hematochezia daily. Endorses fatigue, lightheadedness, generalized weakness, SOB worse with exertion, and near syncope over the past week. States this is typical for her when her blood counts drop too low. She denies abdominal pain, diarrhea, CP, fever/chills, cough, hematuria, or dysuria. She reports chronic, intermittent nausea and vomiting but no worsening from her baseline. Denies hematemesis.     Of note patient recently presented to [facility] on 1/1/25 with symptomatic anemia (Hgb 4.6) and hematochezia and received 2u PRBCs and left AMA. Reports last iron infusion was 1/7.    In the ED, patient tachycardic otherwise vitals stable, afebrile. CBC with Hgb 3.9 and plt 144. Chloride 113. Bicarb 21. Mag 1.9. Lipase WNL. PT/INR and PTT WNL. Type & screen obtained. BNP 38. High sensitivity troponin <3. EKG shows SR w/ incomplete R BBB, 100 bpm, no ST elevation or depression. UPT negative. CXR with interstitial findings may reflect edema noting accentuation by habitus. No large focal consolidation. CTA abd/pelvis with no contrast extravasation into bowel or elsewhere to indicate site of active gastrointestinal hemorrhage. No findings to suggest obstructive uropathy. The  patient received 80mg IV protonix, 1L NS, and is currently received 3u PRBCs.    Overview/Hospital Course:   44 y.o. female with a PMHx of JORGE requiring multiple blood transfusions and IV iron infusion last dose 1/7/25 per patient, h pylori gastritis(treated), vitamin B12 deficiency, HTN, depression, PTSD, and bipolar disorder who was admitted with symptomatic anemia . Reports on and off episodes of bright red bleeding per rectum.  Hemoglobin was 3.9 on admit.  Status post 2 unit PRBC transfusion overnight.  Hemoglobin at 5.5 this morning.  Plan for 2 more units PRBC transfusion today.  CTA abdomen/pelvis with no active extravasation.  GI was consulted.  Plan for C scope on Monday.  Clear liquid diet on Sunday Obtain CTA A/P if patient develops signs of overt bleeding/hemodynamic instability    Interval History:   No further episodes of bleeding overnight     Review of Systems  Objective:     Vital Signs (Most Recent):  Temp: 98.2 °F (36.8 °C) (01/31/25 1241)  Pulse: 82 (01/31/25 1241)  Resp: (!) 22 (01/31/25 1241)  BP: 121/69 (01/31/25 1241)  SpO2: 99 % (01/31/25 1241) Vital Signs (24h Range):  Temp:  [97.8 °F (36.6 °C)-98.9 °F (37.2 °C)] 98.2 °F (36.8 °C)  Pulse:  [72-95] 82  Resp:  [16-22] 22  SpO2:  [97 %-100 %] 99 %  BP: (101-141)/(53-77) 121/69     Weight: 81.6 kg (180 lb)  Body mass index is 34.01 kg/m².    Intake/Output Summary (Last 24 hours) at 1/31/2025 1343  Last data filed at 1/31/2025 1245  Gross per 24 hour   Intake 1718.5 ml   Output --   Net 1718.5 ml         Physical Exam  Constitutional:       General: She is not in acute distress.     Appearance: She is obese.   Cardiovascular:      Rate and Rhythm: Normal rate.      Pulses: Normal pulses.   Pulmonary:      Effort: No respiratory distress.      Breath sounds: Normal breath sounds. No wheezing.   Abdominal:      General: Bowel sounds are normal. There is no distension.      Palpations: Abdomen is soft.      Tenderness: There is no abdominal  tenderness.   Musculoskeletal:      Right lower leg: No edema.      Left lower leg: No edema.   Skin:     General: Skin is warm.   Neurological:      Mental Status: She is alert and oriented to person, place, and time. Mental status is at baseline.             Significant Labs: All pertinent labs within the past 24 hours have been reviewed.    Significant Imaging: I have reviewed all pertinent imaging results/findings within the past 24 hours.      Assessment and Plan     * Rectal bleeding  Patient's hemorrhage is due to gastrointestinal bleed, patient does have a propensity for bleeding due to a platelet defect/deficiency.. Patients most recent Hgb, Hct, platelets, and INR are listed below.  Recent Labs     01/30/25  1708 01/30/25  1720 01/31/25  0703 01/31/25  0909 01/31/25  1325   HGB 3.9*  --  5.9* 5.5* 6.7*   HCT 14.8*   < > 20.5* 19.4* 22.5*   *  --  111* 108* 110*   INR 1.0  --   --   --   --     < > = values in this interval not displayed.       Plan  - Will trend hemoglobin/hematocrit Every 8 hours x24 hrs  - Will monitor and correct any coagulation defects  - Will transfuse if Hgb is <7g/dl (<8g/dl in cases of active ACS) or if patient has rapid bleeding leading to hemodynamic instability    - Started on GIB pathway.  - CLD, then NPO after midnight Sunday  - Received 80mg IV Protonix in the ED, continue 40mg IV BID  - Consult GI, plan for endoscopy Monday  - Use IV anti-emetics as needed.   - CTA abd/pelvis with no contrast extravasation into bowel or elsewhere to indicate site of active gastrointestinal hemorrhage.   -No further episodes of bleeding overnight    -EGD 11/20/24 with GI resolution of prior masses/ulcers per notes but unable to view results. Biopsies again +HP  -EGD (10/8/24): 3.5cm anterior gastric antrum mass and 2cm posterior gastric antrum mass - both biopsies +HP without dysplasia  -Colonoscopy (10/8/24): internal hemorrhoids     PTSD (post-traumatic stress  disorder)        Gastroesophageal reflux disease without esophagitis  -History noted, completed h pylori treatment.  -Continue PPI.    Essential hypertension  Patient's blood pressure range in the last 24 hours was: BP  Min: 101/54  Max: 141/67.The patient's inpatient anti-hypertensive regimen is listed below:  Current Antihypertensives  prazosin capsule 5 mg, Nightly, Oral    Plan  - BP is controlled, no changes needed to their regimen  - Not currently on antihypertensives, takes prazosin for PTSD.    B12 deficiency  -Chronic issue.  -Continue oral vitamin B12.    Bipolar affective disorder, currently active  PTSD (post-traumatic stress disorder)   -Chronic issue.  -Continue prazosin, trileptal, and zyprexa.    Acute blood loss anemia  Anemia is likely due to acute blood loss which was from rectal bleeding and Iron deficiency. Most recent hemoglobin and hematocrit are listed below.  Recent Labs     01/31/25  0703 01/31/25  0909 01/31/25  1325   HGB 5.9* 5.5* 6.7*   HCT 20.5* 19.4* 22.5*       Plan  - Monitor serial CBC: Every 8 hours x24 hrs  - Transfuse PRBC if patient becomes hemodynamically unstable, symptomatic or H/H drops below 7/21.  - Patient has received 2 units of PRBCs on 1/30  and 2units 1/31  - Patient's anemia is currently stable  - Continue oral iron and vitamin B12.      VTE Risk Mitigation (From admission, onward)           Ordered     IP VTE LOW RISK PATIENT  Once         01/30/25 1903     Place sequential compression device  Until discontinued         01/30/25 1903                    Discharge Planning   MARTIN:      Code Status: Full Code   Medical Readiness for Discharge Date:   Discharge Plan A: Home                        Mc Howell MD  Department of Hospital Medicine   [facility] - Emergency Dept

## 2025-01-31 NOTE — ASSESSMENT & PLAN NOTE
Anemia is likely due to acute blood loss which was from rectal bleeding and Iron deficiency. Most recent hemoglobin and hematocrit are listed below.  Recent Labs     01/30/25  1708   HGB 3.9*   HCT 14.8*     Plan  - Monitor serial CBC: Every 8 hours x24 hrs  - Transfuse PRBC if patient becomes hemodynamically unstable, symptomatic or H/H drops below 7/21.  - Patient has received 3 units of PRBCs on 1/30  - Patient's anemia is currently stable  - Anemia panel pending.  - Continue oral iron and vitamin B12.

## 2025-01-31 NOTE — NURSING
Patient received from ED via stretcher. Patient walked to bed independently. No wounds noted. Checked skin with VIJAY Ta. IV flushed and patent. AAOx4. Patient states that by 7pm tonight she will eat regular food. I informed her that she is on a clear liquid diet and npo at midnight tonight to check for rectal bleeding in EGD tomorrow and if she eats she won't be able to get the procedure. She also states that she wants none of the medications that are scheduled besides the pain medication. Blood ordered. Waiting on it to arrive.

## 2025-01-31 NOTE — SUBJECTIVE & OBJECTIVE
Interval History:   No further episodes of bleeding overnight     Review of Systems  Objective:     Vital Signs (Most Recent):  Temp: 98.2 °F (36.8 °C) (01/31/25 1241)  Pulse: 82 (01/31/25 1241)  Resp: (!) 22 (01/31/25 1241)  BP: 121/69 (01/31/25 1241)  SpO2: 99 % (01/31/25 1241) Vital Signs (24h Range):  Temp:  [97.8 °F (36.6 °C)-98.9 °F (37.2 °C)] 98.2 °F (36.8 °C)  Pulse:  [72-95] 82  Resp:  [16-22] 22  SpO2:  [97 %-100 %] 99 %  BP: (101-141)/(53-77) 121/69     Weight: 81.6 kg (180 lb)  Body mass index is 34.01 kg/m².    Intake/Output Summary (Last 24 hours) at 1/31/2025 1343  Last data filed at 1/31/2025 1245  Gross per 24 hour   Intake 1718.5 ml   Output --   Net 1718.5 ml         Physical Exam  Constitutional:       General: She is not in acute distress.     Appearance: She is obese.   Cardiovascular:      Rate and Rhythm: Normal rate.      Pulses: Normal pulses.   Pulmonary:      Effort: No respiratory distress.      Breath sounds: Normal breath sounds. No wheezing.   Abdominal:      General: Bowel sounds are normal. There is no distension.      Palpations: Abdomen is soft.      Tenderness: There is no abdominal tenderness.   Musculoskeletal:      Right lower leg: No edema.      Left lower leg: No edema.   Skin:     General: Skin is warm.   Neurological:      Mental Status: She is alert and oriented to person, place, and time. Mental status is at baseline.             Significant Labs: All pertinent labs within the past 24 hours have been reviewed.    Significant Imaging: I have reviewed all pertinent imaging results/findings within the past 24 hours.

## 2025-01-31 NOTE — ED NOTES
Educated pt about bed status and updated pt on the current plan of care. Pt stated that she would spend the night tonight. I updated physician and unit flow charge on the pt's current decision.

## 2025-01-31 NOTE — ASSESSMENT & PLAN NOTE
History of rectal bleeding both with and without stooling, significant enough to soak underwear. Endorses rectal pain with bleeding.    Recent Labs     01/30/25  1708 01/30/25  1720 01/31/25  0703   HGB 3.9*  --  5.9*   HCT 14.8* <15* 20.5*     Plan  - If patient continues to bleed from rectum, plan for colonoscopy and EGD Monday  - Clear liquid diet Sunday and NPO @ [facility] Sunday  - Monitor Hgb q8hrs  - Transfuse to keep Hgb >7, plts >50  - Hold anticoagulants if safe to do so per primary team  - If becomes hemodynamically unstable with associated large volume hematochezia, recommend STAT CTA and IR embolization if positive  - Recommend hematology/oncology consult or follow up due to patient's consistent need for blood transfusions and iron infusions, but no clear work up from hematology.

## 2025-01-31 NOTE — ASSESSMENT & PLAN NOTE
Patient's blood pressure range in the last 24 hours was: BP  Min: 116/60  Max: 148/72.The patient's inpatient anti-hypertensive regimen is listed below:  Current Antihypertensives  prazosin capsule 5 mg, Nightly, Oral    Plan  - BP is controlled, no changes needed to their regimen  - Not currently on antihypertensives, takes prazosin for PTSD.

## 2025-01-31 NOTE — ASSESSMENT & PLAN NOTE
PTSD (post-traumatic stress disorder)   -Chronic issue.  -Continue prazosin, trileptal, and zyprexa.

## 2025-01-31 NOTE — ASSESSMENT & PLAN NOTE
Anemia is likely due to acute blood loss which was from rectal bleeding and Iron deficiency. Most recent hemoglobin and hematocrit are listed below.  Recent Labs     01/31/25  0703 01/31/25  0909 01/31/25  1325   HGB 5.9* 5.5* 6.7*   HCT 20.5* 19.4* 22.5*       Plan  - Monitor serial CBC: Every 8 hours x24 hrs  - Transfuse PRBC if patient becomes hemodynamically unstable, symptomatic or H/H drops below 7/21.  - Patient has received 2 units of PRBCs on 1/30  and 2units 1/31  - Patient's anemia is currently stable  - Continue oral iron and vitamin B12.

## 2025-01-31 NOTE — ED NOTES
Hourly rounding complete. Pt resting comfortably, speaking without difficulty and show no signs of apparent distress. Pt updated on plan of care. Pain currently 2/10. Comfort, positioning, and restroom needs addressed. Bed locked in lowest position, side rails up x2, call button within reach.

## 2025-01-31 NOTE — H&P
[facility] - Emergency Dept  [facility] Medicine  History & Physical    Patient Name: Janay Mathis  MRN: [id number]  Patient Class: IP- Inpatient  Admission Date: 1/30/2025  Attending Physician: Karin Wyatt MD   Primary Care Provider: Rahul Del Rio MD         Patient information was obtained from patient, past medical records, and ER records.     Subjective:     Principal Problem:Rectal bleeding    Chief Complaint:   Chief Complaint   Patient presents with    Abnormal Lab    Rectal Bleeding     Think hgb is low again, rectal bleeding again, has had numerous transfusions        HPI: Janay Mathis is a 44 y.o. female with a PMHx of anemia requiring multiple blood transfusions, AUB, h pylori gastritis, iron deficiency, vitamin B12 deficiency, HTN, depression, PTSD, and bipolar disorder who presents to the ED for evaluation of rectal bleeding for 3-4 weeks. Reports intermittent issues with rectal bleeding since October, noting it improves but never completely stops. Describes 1 episode of hematochezia daily. Endorses fatigue, lightheadedness, generalized weakness, SOB worse with exertion, and near syncope over the past week. States this is typical for her when her blood counts drop too low. She denies abdominal pain, diarrhea, CP, fever/chills, cough, hematuria, or dysuria. She reports chronic, intermittent nausea and vomiting but no worsening from her baseline. Denies hematemesis.     Of note patient recently presented to [facility] on 1/1/25 with symptomatic anemia (Hgb 4.6) and hematochezia and received 2u PRBCs and left AMA. Reports last iron infusion was 1/7.    In the ED, patient tachycardic otherwise vitals stable, afebrile. CBC with Hgb 3.9 and plt 144. Chloride 113. Bicarb 21. Mag 1.9. Lipase WNL. PT/INR and PTT WNL. Type & screen obtained. BNP 38. High sensitivity troponin <3. EKG shows SR w/ incomplete R BBB, 100 bpm, no ST elevation or depression. UPT negative. CXR with interstitial findings may  reflect edema noting accentuation by habitus. No large focal consolidation. CTA abd/pelvis with no contrast extravasation into bowel or elsewhere to indicate site of active gastrointestinal hemorrhage. No findings to suggest obstructive uropathy. The patient received 80mg IV protonix, 1L NS, and is currently received 3u PRBCs.    History reviewed. No pertinent past medical history.    History reviewed. No pertinent surgical history.    Review of patient's allergies indicates:   Allergen Reactions    Tramadol Itching and Swelling       No current facility-administered medications on file prior to encounter.     Current Outpatient Medications on File Prior to Encounter   Medication Sig    ferrous sulfate 300 mg (60 mg iron)/5 mL syrup Take 300 mg by mouth once daily.    pantoprazole (PROTONIX) 40 MG tablet Take 40 mg by mouth 2 (two) times daily.    cyanocobalamin 500 MCG tablet Take 500 mcg by mouth once daily.    FLUoxetine 10 MG capsule Take 10 mg by mouth.    OLANZapine (ZYPREXA) 2.5 MG tablet Take 1 tablet by mouth every evening.    OXcarbazepine (TRILEPTAL) 300 MG Tab Take 1 tablet by mouth 2 (two) times daily.    prazosin (MINIPRESS) 5 MG capsule Take 5 mg by mouth every evening.    zolpidem (AMBIEN) 5 MG Tab Take 5 mg by mouth nightly as needed (INSOMMNIA).     Family History    None       Tobacco Use    Smoking status: Never    Smokeless tobacco: Never   Substance and Sexual Activity    Alcohol use: Not on file    Drug use: Not on file    Sexual activity: Not on file     Review of Systems   Constitutional:  Positive for fatigue and fever. Negative for appetite change, chills and diaphoresis.   HENT:  Negative for congestion, rhinorrhea and sore throat.    Eyes:  Negative for photophobia and visual disturbance.   Respiratory:  Positive for shortness of breath. Negative for cough and wheezing.         +VENEGAS   Cardiovascular:  Negative for chest pain, palpitations and leg swelling.   Gastrointestinal:  Positive  for blood in stool. Negative for abdominal distention, abdominal pain, diarrhea, nausea and vomiting.   Genitourinary:  Negative for dysuria, frequency, hematuria and vaginal bleeding.   Musculoskeletal:  Negative for gait problem, myalgias and neck pain.   Skin:  Negative for color change, pallor, rash and wound.   Neurological:  Positive for syncope (near syncope), weakness and light-headedness. Negative for headaches.   Psychiatric/Behavioral:  Negative for confusion and hallucinations. The patient is not nervous/anxious.      Objective:     Vital Signs (Most Recent):  Temp: 98.8 °F (37.1 °C) (01/30/25 1700)  Pulse: 89 (01/30/25 1845)  Resp: 18 (01/30/25 1845)  BP: 129/61 (01/30/25 1845)  SpO2: 100 % (01/30/25 1845) Vital Signs (24h Range):  Temp:  [98.5 °F (36.9 °C)-98.8 °F (37.1 °C)] 98.8 °F (37.1 °C)  Pulse:  [] 89  Resp:  [18-20] 18  SpO2:  [98 %-100 %] 100 %  BP: (129-148)/(61-72) 129/61     Weight: 81.6 kg (180 lb)  Body mass index is 34.01 kg/m².     Physical Exam  Vitals and nursing note reviewed.   Constitutional:       General: She is not in acute distress.     Appearance: She is obese. She is not toxic-appearing or diaphoretic.   HENT:      Head: Normocephalic and atraumatic.      Nose: Nose normal.      Mouth/Throat:      Mouth: Mucous membranes are moist.   Eyes:      Pupils: Pupils are equal, round, and reactive to light.   Cardiovascular:      Rate and Rhythm: Regular rhythm. Tachycardia present.      Pulses: Normal pulses.   Pulmonary:      Effort: Pulmonary effort is normal. No respiratory distress.      Breath sounds: No wheezing, rhonchi or rales.   Abdominal:      General: Bowel sounds are normal. There is no distension.      Palpations: Abdomen is soft.      Tenderness: There is no abdominal tenderness. There is no guarding.   Musculoskeletal:         General: Normal range of motion.      Cervical back: Normal range of motion.      Right lower leg: No edema.      Left lower leg: No  edema.   Skin:     General: Skin is warm and dry.      Capillary Refill: Capillary refill takes less than 2 seconds.      Coloration: Skin is pale.   Neurological:      General: No focal deficit present.      Mental Status: She is alert and oriented to person, place, and time.      Sensory: No sensory deficit.      Motor: No weakness.   Psychiatric:         Attention and Perception: Attention normal.         Mood and Affect: Mood normal.         Speech: Speech normal.         Behavior: Behavior is cooperative.         Thought Content: Thought content normal.      Comments: Restless, rocking in bed intermittently.              CRANIAL NERVES     CN III, IV, VI   Pupils are equal, round, and reactive to light.       Significant Labs: All pertinent labs within the past 24 hours have been reviewed.  CBC:   Recent Labs   Lab 01/30/25  1708   WBC 7.86   HGB 3.9*   HCT 14.8*   *     CMP:   Recent Labs   Lab 01/30/25  1708      K 3.9   *   CO2 21*   GLU 92   BUN 9   CREATININE 0.8   CALCIUM 8.9   PROT 7.0   ALBUMIN 3.8   BILITOT 0.4   ALKPHOS 92   AST 14   ALT 14   ANIONGAP 7*     Cardiac Markers:   Recent Labs   Lab 01/30/25  1708   BNP 38     Coagulation:   Recent Labs   Lab 01/30/25  1708   INR 1.0   APTT 21.1     Lipase:   Recent Labs   Lab 01/30/25  1708   LIPASE 18     Magnesium:   Recent Labs   Lab 01/30/25  1708   MG 1.9     Troponin:   Recent Labs   Lab 01/30/25  1708   TROPONINIHS <3       Significant Imaging: I have reviewed all pertinent imaging results/findings within the past 24 hours.  Imaging Results              CTA Acute GI Bleed, Abdomen and Pelvis (Final result)  Result time 01/30/25 18:45:37      Final result by Juan Roberts MD (01/30/25 18:45:37)                   Impression:      1. No contrast extravasation into bowel or elsewhere to indicate site of active gastrointestinal hemorrhage.  2. No findings to suggest obstructive uropathy.  3. Please see above for several  additional findings.      Electronically signed by: Juan Roberts MD  Date:    01/30/2025  Time:    18:45               Narrative:    EXAMINATION:  CTA ACUTE GI BLEED, ABDOMEN AND PELVIS    CLINICAL HISTORY:  rectal bleeding;    TECHNIQUE:  Axial images of the abdomen and pelvis were obtained pre and post the administration of 100 cc omni 350 IV contrast following the CTA acute GI bleed abdomen and pelvis protocol.  Pre contrast and postcontrast axial images were reviewed as well as coronal and sagittal reformatted images and MIPS reformatted images.    COMPARISON:  None    FINDINGS:  Images of the lower thorax are remarkable for bilateral dependent atelectasis.    Allowing for bolus timing, the liver, spleen, pancreas, gallbladder and adrenal glands are grossly unremarkable.  The stomach is nondistended, no gastric wall thickening.  The portal vein, splenic vein and SMV all are patent.    The kidneys enhance symmetrically without hydronephrosis or nephrolithiasis.  The bilateral ureters are unremarkable without calculi seen.  The urinary bladder is mildly distended without wall thickening.  The uterus is unremarkable.  The adnexa is unremarkable.    The distal large bowel is for the most part decompressed.  No contrast extravasation into the large bowel to suggest site of active gastrointestinal hemorrhage.  The terminal ileum and appendix are unremarkable.  No contrast extravasation into the gastric lumen or small bowel to suggest site of active gastrointestinal hemorrhage.  There are a few scattered shotty periaortic, pericaval, and mesenteric lymph nodes.  No focal organized pelvic fluid collection.    No acute osseous abnormality.  No significant inguinal lymphadenopathy.    Vascular details: The celiac axis, SMA, bilateral renal arteries, KERRY, and distal aorto iliac branches all are patent.  No aneurysm or dissection.                                       X-Ray Chest AP Portable (Final result)  Result time  01/30/25 16:20:43      Final result by Juan Roberts MD (01/30/25 16:20:43)                   Impression:      1. Interstitial findings may reflect edema noting accentuation by habitus.  No large focal consolidation.      Electronically signed by: Juan Roberts MD  Date:    01/30/2025  Time:    16:20               Narrative:    EXAMINATION:  XR CHEST AP PORTABLE    CLINICAL HISTORY:  shortness of breath;    TECHNIQUE:  Single frontal view of the chest was performed.    COMPARISON:  None    FINDINGS:  The cardiomediastinal silhouette is not enlarged, magnified by technique.  There is no pleural effusion.  The trachea is midline.  The lungs are symmetrically expanded bilaterally with mildly coarse central hilar interstitial attenuation accentuated by habitus..  No large focal consolidation seen.  There is no pneumothorax.  The osseous structures are remarkable for levo scoliotic curvature of the spine..                                      Assessment/Plan:     * Rectal bleeding  Patient's hemorrhage is due to gastrointestinal bleed, patient does have a propensity for bleeding due to a platelet defect/deficiency.. Patients most recent Hgb, Hct, platelets, and INR are listed below.  Recent Labs     01/30/25  1708   HGB 3.9*   HCT 14.8*   *   INR 1.0     Plan  - Will trend hemoglobin/hematocrit Every 8 hours x24 hrs  - Will monitor and correct any coagulation defects  - Will transfuse if Hgb is <7g/dl (<8g/dl in cases of active ACS) or if patient has rapid bleeding leading to hemodynamic instability    - Started on GIB pathway.  - CLD, then NPO after midnight  - Received 80mg IV Protonix in the ED, continue 40mg IV BID  - Consult GI, appreciate recs.  - Use IV anti-emetics as needed.   - CTA abd/pelvis with no contrast extravasation into bowel or elsewhere to indicate site of active gastrointestinal hemorrhage.     -EGD 11/20/24 with GI resolution of prior masses/ulcers per notes but unable to view  results. Biopsies again +HP  -EGD (10/8/24): 3.5cm anterior gastric antrum mass and 2cm posterior gastric antrum mass - both biopsies +HP without dysplasia  -Colonoscopy (10/8/24): internal hemorrhoids     Acute blood loss anemia  Anemia is likely due to acute blood loss which was from rectal bleeding and Iron deficiency. Most recent hemoglobin and hematocrit are listed below.  Recent Labs     01/30/25  1708   HGB 3.9*   HCT 14.8*     Plan  - Monitor serial CBC: Every 8 hours x24 hrs  - Transfuse PRBC if patient becomes hemodynamically unstable, symptomatic or H/H drops below 7/21.  - Patient has received 3 units of PRBCs on 1/30  - Patient's anemia is currently stable  - Anemia panel pending.  - Continue oral iron and vitamin B12.    Gastroesophageal reflux disease without esophagitis  -History noted, completed h pylori treatment.  -Continue PPI.    Essential hypertension  Patient's blood pressure range in the last 24 hours was: BP  Min: 116/60  Max: 148/72.The patient's inpatient anti-hypertensive regimen is listed below:  Current Antihypertensives  prazosin capsule 5 mg, Nightly, Oral    Plan  - BP is controlled, no changes needed to their regimen  - Not currently on antihypertensives, takes prazosin for PTSD.    B12 deficiency  -Chronic issue.  -Continue oral vitamin B12.    Bipolar affective disorder, currently active  PTSD (post-traumatic stress disorder)   -Chronic issue.  -Continue prazosin, trileptal, and zyprexa.      VTE Risk Mitigation (From admission, onward)           Ordered     IP VTE LOW RISK PATIENT  Once         01/30/25 1903     Place sequential compression device  Until discontinued         01/30/25 1903                                    Amber Harkins NP  Department of Hospital Medicine  [facility] - Emergency Dept

## 2025-01-31 NOTE — HPI
Janay Mathis is a 44 y.o. female with a PMHx of anemia requiring multiple blood transfusions, AUB, h pylori gastritis, iron deficiency, vitamin B12 deficiency, HTN, depression, PTSD, and bipolar disorder who presents to the ED for evaluation of rectal bleeding for 3-4 weeks. Reports intermittent issues with rectal bleeding since October, noting it improves but never completely stops. Describes 1 episode of hematochezia daily. Endorses fatigue, lightheadedness, generalized weakness, SOB worse with exertion, and near syncope over the past week. States this is typical for her when her blood counts drop too low. She denies abdominal pain, diarrhea, CP, fever/chills, cough, hematuria, or dysuria. She reports chronic, intermittent nausea and vomiting but no worsening from her baseline. Denies hematemesis.     Of note patient recently presented to [facility] on 1/1/25 with symptomatic anemia (Hgb 4.6) and hematochezia and received 2u PRBCs and left AMA. Reports last iron infusion was 1/7.    In the ED, patient tachycardic otherwise vitals stable, afebrile. CBC with Hgb 3.9 and plt 144. Chloride 113. Bicarb 21. Mag 1.9. Lipase WNL. PT/INR and PTT WNL. Type & screen obtained. BNP 38. High sensitivity troponin <3. EKG shows SR w/ incomplete R BBB, 100 bpm, no ST elevation or depression. UPT negative. CXR with interstitial findings may reflect edema noting accentuation by habitus. No large focal consolidation. CTA abd/pelvis with no contrast extravasation into bowel or elsewhere to indicate site of active gastrointestinal hemorrhage. No findings to suggest obstructive uropathy. The patient received 80mg IV protonix, 1L NS, and is currently received 3u PRBCs.

## 2025-01-31 NOTE — ASSESSMENT & PLAN NOTE
Patient's hemorrhage is due to gastrointestinal bleed, patient does have a propensity for bleeding due to a platelet defect/deficiency.. Patients most recent Hgb, Hct, platelets, and INR are listed below.  Recent Labs     01/30/25  1708 01/30/25  1720 01/31/25  0703 01/31/25  0909 01/31/25  1325   HGB 3.9*  --  5.9* 5.5* 6.7*   HCT 14.8*   < > 20.5* 19.4* 22.5*   *  --  111* 108* 110*   INR 1.0  --   --   --   --     < > = values in this interval not displayed.       Plan  - Will trend hemoglobin/hematocrit Every 8 hours x24 hrs  - Will monitor and correct any coagulation defects  - Will transfuse if Hgb is <7g/dl (<8g/dl in cases of active ACS) or if patient has rapid bleeding leading to hemodynamic instability    - Started on GIB pathway.  - CLD, then NPO after midnight Sunday  - Received 80mg IV Protonix in the ED, continue 40mg IV BID  - Consult GI, plan for endoscopy Monday  - Use IV anti-emetics as needed.   - CTA abd/pelvis with no contrast extravasation into bowel or elsewhere to indicate site of active gastrointestinal hemorrhage.   -No further episodes of bleeding overnight    -EGD 11/20/24 with GI resolution of prior masses/ulcers per notes but unable to view results. Biopsies again +HP  -EGD (10/8/24): 3.5cm anterior gastric antrum mass and 2cm posterior gastric antrum mass - both biopsies +HP without dysplasia  -Colonoscopy (10/8/24): internal hemorrhoids

## 2025-01-31 NOTE — SUBJECTIVE & OBJECTIVE
History reviewed. No pertinent past medical history.    History reviewed. No pertinent surgical history.    Review of patient's allergies indicates:   Allergen Reactions    Tramadol Itching and Swelling     Family History    None       Tobacco Use    Smoking status: Never    Smokeless tobacco: Never   Substance and Sexual Activity    Alcohol use: Not on file    Drug use: Not on file    Sexual activity: Not on file     Review of Systems   Constitutional:  Positive for fatigue. Negative for activity change, appetite change, chills, diaphoresis and fever.   Respiratory:  Positive for shortness of breath.    Gastrointestinal:  Positive for abdominal pain, anal bleeding, blood in stool and rectal pain. Negative for abdominal distention, constipation, diarrhea, nausea and vomiting.   Neurological:  Positive for dizziness, weakness and light-headedness. Negative for syncope.     Objective:     Vital Signs (Most Recent):  Temp: 97.8 °F (36.6 °C) (01/31/25 0600)  Pulse: 78 (01/31/25 0600)  Resp: 16 (01/31/25 0600)  BP: (!) 105/58 (01/31/25 0600)  SpO2: 97 % (01/31/25 0600) Vital Signs (24h Range):  Temp:  [97.8 °F (36.6 °C)-98.9 °F (37.2 °C)] 97.8 °F (36.6 °C)  Pulse:  [] 78  Resp:  [16-21] 16  SpO2:  [97 %-100 %] 97 %  BP: (101-148)/(53-77) 105/58     Weight: 81.6 kg (180 lb) (01/30/25 1218)  Body mass index is 34.01 kg/m².      Intake/Output Summary (Last 24 hours) at 1/31/2025 0853  Last data filed at 1/31/2025 0725  Gross per 24 hour   Intake 1452.5 ml   Output --   Net 1452.5 ml       Lines/Drains/Airways       Peripheral Intravenous Line  Duration                  Peripheral IV - Single Lumen 01/30/25 1650 20 G Anterior;Right Antecubital <1 day                     Physical Exam  Vitals and nursing note reviewed.   Constitutional:       General: She is not in acute distress.     Appearance: She is obese. She is ill-appearing. She is not diaphoretic.   HENT:      Head: Normocephalic and atraumatic.       Mouth/Throat:      Pharynx: Oropharynx is clear.   Eyes:      Extraocular Movements: Extraocular movements intact.      Conjunctiva/sclera: Conjunctivae normal.   Pulmonary:      Effort: Pulmonary effort is normal. No respiratory distress.   Abdominal:      General: Abdomen is flat. There is no distension.      Palpations: Abdomen is soft. There is no mass.      Tenderness: There is no abdominal tenderness. There is no guarding or rebound.      Hernia: No hernia is present.   Skin:     Coloration: Skin is pale.   Neurological:      Mental Status: She is alert and oriented to person, place, and time. Mental status is at baseline.   Psychiatric:         Mood and Affect: Mood normal.         Behavior: Behavior normal.          Significant Labs:  CBC:   Recent Labs   Lab 01/30/25  1708 01/31/25  0703   WBC 7.86 12.20   HGB 3.9* 5.9*   HCT 14.8* 20.5*   * 111*     CMP:   Recent Labs   Lab 01/31/25  0406   GLU 98   CALCIUM 7.9*   ALBUMIN 3.3*   PROT 6.0      K 3.8   CO2 19*   *   BUN 10   CREATININE 0.8   ALKPHOS 79   ALT 13   AST 14   BILITOT 2.4*     Coagulation:   Recent Labs   Lab 01/30/25  1708   INR 1.0   APTT 21.1     Lipase:   Recent Labs   Lab 01/30/25  1708   LIPASE 18       Significant Imaging:  Imaging results within the past 24 hours have been reviewed.    CTA GIB: No contrast extravasation into bowel or elsewhere to indicate site of active gastrointestinal hemorrhage.

## 2025-01-31 NOTE — ED NOTES
Assumed care of patient at this time. Pt arrived to ED with a complaint abnormal lab,rectal bleeding.Pt placed in hospital gown and currently lying in stretcher. Vital signs are stable, provided pt with warm blanket. Pt denied restroom use. No other complaints from pt at this time.       Review of patient's allergies indicates:   Allergen Reactions    Tramadol Itching and Swelling     History reviewed. No pertinent past medical history.

## 2025-01-31 NOTE — ED NOTES
Telemetry Verification   Patient placed on Telemetry Box  Verified with War Room  Box # [id number]   Monitor Tech Deep   Rate 75   Rhythm Normal Sinus

## 2025-01-31 NOTE — HOSPITAL COURSE
44 y.o. female with a PMHx of JORGE requiring multiple blood transfusions and IV iron infusion last dose 1/7/25 per patient, h pylori gastritis(treated), vitamin B12 deficiency, HTN, depression, PTSD, and bipolar disorder who was admitted with symptomatic anemia . Reports on and off episodes of bright red bleeding per rectum.  Hemoglobin was 3.9 on admit.  Status post 5 unit PRBC transfusion overnight.  Hemoglobin at 8.6  CTA abdomen/pelvis with no active extravasation.  GI was consulted.  S/p EGD and colonoscopy on 02/03/25 with Normal esophagus/stomach/duodenum. External hemorrhoids Repeat c scope in 10 years Initiated on metamucil. Continue iron and b12 supp To follow up with PCP in 1 week

## 2025-01-31 NOTE — ED NOTES
Patient had initial unit of blood running and the line kept getting air in it tubing swapped out on two separate occasions and it was noticed the blood was clotting where it flows out of the bag, the unit was stopped and returned to blood bank and another unit was received and started without incident. The first unit was not completed . MD was notified and stated we would recheck her H&H after the second unit completes and reevaluate if patient needs the third unit.

## 2025-01-31 NOTE — PLAN OF CARE
[facility]  Initial Discharge Assessment       Primary Care Provider: Rahul Del Rio MD    Admission Diagnosis: Anemia [D64.9]    Admission Date: 1/30/2025  Expected Discharge Date:     Pt stated she is independent with her ambulation and ADL's and does not require assistance or equipment    Pt to d/c home with no needs when ready    Transition of Care Barriers: (P) None    Payor: MEDICAID / Plan: AETNA [facility] / Product Type: Managed Medicaid /     Extended Emergency Contact Information  Primary Emergency Contact: [place] [address] [facility] Milo Jessica  Mobile Phone: [phone]  Relation: Son  Secondary Emergency Contact: Zaid Angeles   [facility]  Mobile Phone: [phone]  Relation: Sister    Discharge Plan A: (P) Home  Discharge Plan B: (P) Home    No Pharmacies Listed    Initial Assessment (most recent)       Adult Discharge Assessment - 01/31/25 1305          Discharge Assessment    Assessment Type Discharge Planning Assessment (P)      Confirmed/corrected address, phone number and insurance Yes (P)      Confirmed Demographics Correct on Facesheet (P)      Source of Information patient (P)      Reason For Admission Rectal bleeding (P)      People in Home alone (P)      Facility Arrived From: home (P)      Do you expect to return to your current living situation? Yes (P)      Do you have help at home or someone to help you manage your care at home? No (P)      Prior to hospitilization cognitive status: Alert/Oriented;No Deficits (P)      Current cognitive status: No Deficits;Alert/Oriented (P)      Walking or Climbing Stairs Difficulty no (P)      Dressing/Bathing Difficulty no (P)      Home Accessibility wheelchair accessible (P)      Home Layout Able to live on 1st floor (P)      Equipment Currently Used at Home none (P)      Patient currently being followed by outpatient case management? No (P)      Do you currently have  service(s) that help you manage your care at home? No (P)      Do you have any problems affording any of your prescribed medications? No (P)      Is the patient taking medications as prescribed? yes (P)      Who is going to help you get home at discharge? family/friends (P)      How do you get to doctors appointments? other (see comments) (P)    Uber/Lyft    Are you on dialysis? No (P)      Do you take coumadin? No (P)      Discharge Plan A Home (P)      Discharge Plan B Home (P)      DME Needed Upon Discharge  none (P)      Discharge Plan discussed with: Patient (P)      Transition of Care Barriers None (P)         Physical Activity    On average, how many days per week do you engage in moderate to strenuous exercise (like a brisk walk)? 0 days (P)      On average, how many minutes do you engage in exercise at this level? 0 min (P)         Financial Resource Strain    How hard is it for you to pay for the very basics like food, housing, medical care, and heating? Not very hard (P)         Housing Stability    In the last 12 months, was there a time when you were not able to pay the mortgage or rent on time? No (P)      At any time in the past 12 months, were you homeless or living in a shelter (including now)? No (P)         Transportation Needs    Has the lack of transportation kept you from medical appointments, meetings, work or from getting things needed for daily living? No (P)         Food Insecurity    Within the past 12 months, you worried that your food would run out before you got the money to buy more. Never true (P)      Within the past 12 months, the food you bought just didn't last and you didn't have money to get more. Never true (P)         Stress    Do you feel stress - tense, restless, nervous, or anxious, or unable to sleep at night because your mind is troubled all the time - these days? Only a little (P)         Social Isolation    How often do you feel lonely or isolated from those around you?   Never (P)         Alcohol Use    Q1: How often do you have a drink containing alcohol? Never (P)      Q2: How many drinks containing alcohol do you have on a typical day when you are drinking? Patient does not drink (P)      Q3: How often do you have six or more drinks on one occasion? Never (P)         Utilities    In the past 12 months has the electric, gas, oil, or water company threatened to shut off services in your home? No (P)         Health Literacy    How often do you need to have someone help you when you read instructions, pamphlets, or other written material from your doctor or pharmacy? Rarely (P)                       Gabbie Mccray CD, MSW, LMSW, RSW   Case Management  [facility]  Email: [email or web address]

## 2025-01-31 NOTE — ED NOTES
Pt would prefer not to take medication on an empty stomach and pt does not want the clear liquid diet order placed by physician.

## 2025-01-31 NOTE — CONSULTS
[facility] Emergency Dept  Gastroenterology  Consult Note    Patient Name: Janay Mathis  MRN: [id number]  Admission Date: 1/30/2025  Hospital Length of Stay: 1 days  Code Status: Full Code   Attending Provider: Mc Howell,*   Consulting Provider: Domenic Dey MD  Primary Care Physician: Rahul Del Rio MD  Principal Problem:Rectal bleeding    Inpatient consult to Gastroenterology  Consult performed by: Domenic Dey MD  Consult ordered by: Amber Harkins NP  Reason for consult: Rectal bleeding        Subjective:     HPI:  Janay Mathis is a 44 y.o. female with a PMHx of anemia requiring multiple blood transfusions, AUB, h pylori gastritis, iron deficiency, vitamin B12 deficiency, HTN, depression, PTSD, and bipolar disorder who presents to the ED for evaluation of rectal bleeding for 3-4 weeks. Reports intermittent issues with rectal bleeding since October, noting it improves but never completely stops. Describes 1 episode of hematochezia daily, but has increasing frequency in the last few days where she is bleeding all day even when not using bathroom. Endorses fatigue, lightheadedness, generalized weakness, SOB worse with exertion, and near syncope over the past week. States this is typical for her when her blood counts drop too low. She denies abdominal pain, diarrhea, CP, fever/chills, cough, hematuria, or dysuria. She reports chronic, intermittent nausea and vomiting but no worsening from her baseline. Denies hematemesis.      Patient had a recent EGD/colonoscopy in October 2024, which showed 2 antrum masses biopsy + for H pylori and non bleeding hemorrhoids. Completed quadruple therapy. Patient recently presented to [facility] on 1/1/25 with symptomatic anemia (Hgb 4.6) and hematochezia and received 2u PRBCs and left AMA. Reports last iron infusion was 1/7.     In the ED, patient tachycardic otherwise vitals stable, afebrile. CBC with Hgb 3.9 and plt 144. PT/INR and PTT WNL. Type &  screen obtained. CTA abd/pelvis with no contrast extravasation into bowel or elsewhere to indicate site of active gastrointestinal hemorrhage. The patient received 80mg IV protonix, 1L NS, and is currently received 3u PRBCs.    History reviewed. No pertinent past medical history.    History reviewed. No pertinent surgical history.    Review of patient's allergies indicates:   Allergen Reactions    Tramadol Itching and Swelling     Family History    None       Tobacco Use    Smoking status: Never    Smokeless tobacco: Never   Substance and Sexual Activity    Alcohol use: Not on file    Drug use: Not on file    Sexual activity: Not on file     Review of Systems   Constitutional:  Positive for fatigue. Negative for activity change, appetite change, chills, diaphoresis and fever.   Respiratory:  Positive for shortness of breath.    Gastrointestinal:  Positive for abdominal pain, anal bleeding, blood in stool and rectal pain. Negative for abdominal distention, constipation, diarrhea, nausea and vomiting.   Neurological:  Positive for dizziness, weakness and light-headedness. Negative for syncope.     Objective:     Vital Signs (Most Recent):  Temp: 97.8 °F (36.6 °C) (01/31/25 0600)  Pulse: 78 (01/31/25 0600)  Resp: 16 (01/31/25 0600)  BP: (!) 105/58 (01/31/25 0600)  SpO2: 97 % (01/31/25 0600) Vital Signs (24h Range):  Temp:  [97.8 °F (36.6 °C)-98.9 °F (37.2 °C)] 97.8 °F (36.6 °C)  Pulse:  [] 78  Resp:  [16-21] 16  SpO2:  [97 %-100 %] 97 %  BP: (101-148)/(53-77) 105/58     Weight: 81.6 kg (180 lb) (01/30/25 1218)  Body mass index is 34.01 kg/m².      Intake/Output Summary (Last 24 hours) at 1/31/2025 0853  Last data filed at 1/31/2025 0725  Gross per 24 hour   Intake 1452.5 ml   Output --   Net 1452.5 ml       Lines/Drains/Airways       Peripheral Intravenous Line  Duration                  Peripheral IV - Single Lumen 01/30/25 1650 20 G Anterior;Right Antecubital <1 day                     Physical Exam  Vitals and  nursing note reviewed.   Constitutional:       General: She is not in acute distress.     Appearance: She is obese. She is ill-appearing. She is not diaphoretic.   HENT:      Head: Normocephalic and atraumatic.      Mouth/Throat:      Pharynx: Oropharynx is clear.   Eyes:      Extraocular Movements: Extraocular movements intact.      Conjunctiva/sclera: Conjunctivae normal.   Pulmonary:      Effort: Pulmonary effort is normal. No respiratory distress.   Abdominal:      General: Abdomen is flat. There is no distension.      Palpations: Abdomen is soft. There is no mass.      Tenderness: There is no abdominal tenderness. There is no guarding or rebound.      Hernia: No hernia is present.   Skin:     Coloration: Skin is pale.   Neurological:      Mental Status: She is alert and oriented to person, place, and time. Mental status is at baseline.   Psychiatric:         Mood and Affect: Mood normal.         Behavior: Behavior normal.          Significant Labs:  CBC:   Recent Labs   Lab 01/30/25  1708 01/31/25  0703   WBC 7.86 12.20   HGB 3.9* 5.9*   HCT 14.8* 20.5*   * 111*     CMP:   Recent Labs   Lab 01/31/25  0406   GLU 98   CALCIUM 7.9*   ALBUMIN 3.3*   PROT 6.0      K 3.8   CO2 19*   *   BUN 10   CREATININE 0.8   ALKPHOS 79   ALT 13   AST 14   BILITOT 2.4*     Coagulation:   Recent Labs   Lab 01/30/25  1708   INR 1.0   APTT 21.1     Lipase:   Recent Labs   Lab 01/30/25  1708   LIPASE 18       Significant Imaging:  Imaging results within the past 24 hours have been reviewed.    CTA GIB: No contrast extravasation into bowel or elsewhere to indicate site of active gastrointestinal hemorrhage.      Assessment/Plan:     GI  * Rectal bleeding  History of rectal bleeding both with and without stooling, significant enough to soak underwear. Endorses rectal pain with bleeding.    Recent Labs     01/30/25  1708 01/30/25  1720 01/31/25  0703   HGB 3.9*  --  5.9*   HCT 14.8* <15* 20.5*     Plan  - If patient  continues to bleed from rectum, plan for colonoscopy and EGD Monday  - Clear liquid diet Sunday and NPO @ [facility] Sunday  - Monitor Hgb q8hrs  - Transfuse to keep Hgb >7, plts >50  - Hold anticoagulants if safe to do so per primary team  - If becomes hemodynamically unstable with associated large volume hematochezia, recommend STAT CTA and IR embolization if positive  - Recommend hematology/oncology consult or follow up due to patient's consistent need for blood transfusions and iron infusions, but no clear work up from hematology.        Thank you for your consult. I will follow-up with patient. Please contact us if you have any additional questions.    Domenic Dey MD  Gastroenterology  [facility] - Emergency Dept

## 2025-01-31 NOTE — ANESTHESIA PREPROCEDURE EVALUATION
[facility]  Anesthesia Pre-Operative Evaluation         Patient Name: Janay Mathis  YOB: 1980  MRN: [id number]    SUBJECTIVE:     Pre-operative evaluation for Procedure(s) (LRB):  EGD (ESOPHAGOGASTRODUODENOSCOPY) (N/A)  COLONOSCOPY (N/A)     ++++ PAPER CONSENT SIGNED AND WITNESSED IN CHART++++  01/31/2025    Janay Mathis is a 44 y.o. female w/ a significant PMHx of anemia requiring multiple blood transfusions, AUB, gastritis, HTN, depression, PTSD, and bipolar disorderwho presented with rectal bleeding.     Recent admission at [facility] on 1/1/25 with symptomatic anemia (Hgb 4.6) and hematochezia and received 2u PRBCs and left AMA.    On arrival to ED, Hgb 3.9 and plt 144. Patient has received multiple units of blood since admission.    Patient now presents for the above procedure(s).      LDA:        Peripheral IV - Single Lumen 01/30/25 1650 20 G Anterior;Right Antecubital (Active)   Site Assessment Clean;Dry;Intact;No redness;No swelling 01/31/25 1519   Line Securement Device Secured with sutureless device 01/31/25 1519   Extremity Assessment Distal to IV No abnormal discoloration 01/31/25 1519   Line Status Flushed 01/31/25 1519   Dressing Status Clean;Dry;Intact 01/31/25 1519   Dressing Intervention Integrity maintained 01/31/25 1519   Site Change Due 02/03/25 01/31/25 1519   Number of days: 1       Prev airway: None documented.    Drips: None documented.      Patient Active Problem List   Diagnosis    Anemia    Acute blood loss anemia    Bipolar affective disorder, currently active    Microcytic anemia    Rectal bleeding    B12 deficiency    Essential hypertension    Gastroesophageal reflux disease without esophagitis    PTSD (post-traumatic stress disorder)       Review of patient's allergies indicates:   Allergen Reactions    Tramadol Itching and Swelling       Current Inpatient Medications:   cyanocobalamin  500 mcg Oral Daily    ferrous sulfate  300 mg Oral Daily     OLANZapine  2.5 mg Oral QHS    OXcarbazepine  300 mg Oral BID    pantoprazole  40 mg Intravenous BID    [START ON 2/2/2025] polyethylene glycol  4,000 mL Oral Once    prazosin  5 mg Oral QHS       No current facility-administered medications on file prior to encounter.     Current Outpatient Medications on File Prior to Encounter   Medication Sig Dispense Refill    ferrous sulfate 300 mg (60 mg iron)/5 mL syrup Take 300 mg by mouth once daily.      pantoprazole (PROTONIX) 40 MG tablet Take 40 mg by mouth 2 (two) times daily.      cyanocobalamin 500 MCG tablet Take 500 mcg by mouth once daily.      FLUoxetine 10 MG capsule Take 10 mg by mouth.      OLANZapine (ZYPREXA) 2.5 MG tablet Take 1 tablet by mouth every evening.      OXcarbazepine (TRILEPTAL) 300 MG Tab Take 1 tablet by mouth 2 (two) times daily.      prazosin (MINIPRESS) 5 MG capsule Take 5 mg by mouth every evening.      zolpidem (AMBIEN) 5 MG Tab Take 5 mg by mouth nightly as needed (INSOMMNIA).         History reviewed. No pertinent surgical history.    Social History     Socioeconomic History    Marital status: Single   Tobacco Use    Smoking status: Never    Smokeless tobacco: Never     Social Drivers of Health     Financial Resource Strain: Low Risk  (1/31/2025)    Overall Financial Resource Strain (CARDIA)     Difficulty of Paying Living Expenses: Not very hard   Food Insecurity: No Food Insecurity (1/31/2025)    Hunger Vital Sign     Worried About Running Out of Food in the Last Year: Never true     Ran Out of Food in the Last Year: Never true   Transportation Needs: No Transportation Needs (1/31/2025)    TRANSPORTATION NEEDS     Transportation : No   Physical Activity: Inactive (1/31/2025)    Exercise Vital Sign     Days of Exercise per Week: 0 days     Minutes of Exercise per Session: 0 min   Stress: No Stress Concern Present (1/31/2025)    [employer] - Occupational Stress Questionnaire     Feeling of Stress : Only a  little   Housing Stability: Low Risk  (1/31/2025)    Housing Stability Vital Sign     Unable to Pay for Housing in the Last Year: No     Homeless in the Last Year: No       OBJECTIVE:     Vital Signs Range (Last 24H):  Temp:  [36.6 °C (97.8 °F)-37.2 °C (99 °F)]   Pulse:  [72-90]   Resp:  [16-22]   BP: (101-141)/(53-77)   SpO2:  [97 %-100 %]       Significant Labs:  Lab Results   Component Value Date    WBC 11.70 01/31/2025    HGB 6.7 (L) 01/31/2025    HCT 22.5 (L) 01/31/2025     (L) 01/31/2025    CHOL 120 08/05/2024    TRIG 93 08/05/2024    HDL 57 08/05/2024    ALT 13 01/31/2025    AST 14 01/31/2025     01/31/2025    K 3.8 01/31/2025     (H) 01/31/2025    CREATININE 0.8 01/31/2025    BUN 10 01/31/2025    CO2 19 (L) 01/31/2025    TSH 0.66 08/05/2024    INR 1.0 01/30/2025    HGBA1C 4.2 (L) 08/05/2024       Diagnostic Studies: No relevant studies.    EKG:   Results for orders placed or performed during the hospital encounter of 01/30/25   EKG 12-lead    Collection Time: 01/30/25  4:36 PM   Result Value Ref Range    QRS Duration 98 ms    [facility] QTC Calculation 472 ms    Narrative    Test Reason : K92.2,    Vent. Rate : 100 BPM     Atrial Rate : 100 BPM     P-R Int : 136 ms          QRS Dur :  98 ms      QT Int : 366 ms       P-R-T Axes :  58  -3  20 degrees    QTcB Int : 472 ms    Normal sinus rhythm  Possible Left atrial enlargement  Incomplete right bundle branch block  Abnormal R wave progression in the precordial leads  Cannot rule out Anterior infarct (cited on or before 30-Jan-2025)  Abnormal ECG  When compared with ECG of 30-Jan-2025 12:22,  No significant change was found  Confirmed by Bharathi Corrigan ([id number]) on 1/31/2025 8:12:30 AM    Referred By: AAAREFERRAL SELF           Confirmed By: Bharathi Corrigan       2D ECHO:  TTE:  No results found for this or any previous visit.    NORMA:  No results found for this or any previous visit.    ASSESSMENT/PLAN:         Pre-op Assessment    I have reviewed the  Patient Summary Reports.     I have reviewed the Nursing Notes. I have reviewed the NPO Status.   I have reviewed the Medications.     Review of Systems  Anesthesia Hx:               Denies Personal Hx of Anesthesia complications.                    Social:  Non-Smoker       Hematology/Oncology:    Oncology Normal    -- Anemia:                                  EENT/Dental:  EENT/Dental Normal           Cardiovascular:     Hypertension                                          Pulmonary:  Pulmonary Normal                       Hepatic/GI:     GERD                Endocrine:  Endocrine Normal            Psych:  Psychiatric History  depression                   Anesthesia Plan  Type of Anesthesia, risks & benefits discussed:    Anesthesia Type: Gen ETT, Gen Natural Airway, MAC  Intra-op Monitoring Plan: Standard ASA Monitors  Post Op Pain Control Plan: multimodal analgesia and IV/PO Opioids PRN  Induction:  IV  Airway Plan: Video and Direct, Post-Induction  Informed Consent: Informed consent signed with the Patient and all parties understand the risks and agree with anesthesia plan.  All questions answered.   ASA Score: 3  Day of Surgery Review of History & Physical: H&P Update referred to the surgeon/provider.    Ready For Surgery From Anesthesia Perspective.     .

## 2025-02-01 LAB
ALBUMIN SERPL BCP-MCNC: 3.2 G/DL (ref 3.5–5.2)
ALP SERPL-CCNC: 87 U/L (ref 40–150)
ALT SERPL W/O P-5'-P-CCNC: 17 U/L (ref 10–44)
ANION GAP SERPL CALC-SCNC: 9 MMOL/L (ref 8–16)
AST SERPL-CCNC: 20 U/L (ref 10–40)
BASOPHILS # BLD AUTO: 0.03 K/UL (ref 0–0.2)
BASOPHILS NFR BLD: 0.3 % (ref 0–1.9)
BILIRUB SERPL-MCNC: 1.5 MG/DL (ref 0.1–1)
BUN SERPL-MCNC: 6 MG/DL (ref 6–20)
CALCIUM SERPL-MCNC: 8.2 MG/DL (ref 8.7–10.5)
CHLORIDE SERPL-SCNC: 109 MMOL/L (ref 95–110)
CO2 SERPL-SCNC: 20 MMOL/L (ref 23–29)
CREAT SERPL-MCNC: 0.7 MG/DL (ref 0.5–1.4)
DIFFERENTIAL METHOD BLD: ABNORMAL
EOSINOPHIL # BLD AUTO: 0.2 K/UL (ref 0–0.5)
EOSINOPHIL NFR BLD: 2 % (ref 0–8)
ERYTHROCYTE [DISTWIDTH] IN BLOOD BY AUTOMATED COUNT: 20.7 % (ref 11.5–14.5)
EST. GFR  (NO RACE VARIABLE): >60 ML/MIN/1.73 M^2
GLUCOSE SERPL-MCNC: 80 MG/DL (ref 70–110)
HCT VFR BLD AUTO: 26.3 % (ref 37–48.5)
HGB BLD-MCNC: 8.1 G/DL (ref 12–16)
IMM GRANULOCYTES # BLD AUTO: 0.04 K/UL (ref 0–0.04)
IMM GRANULOCYTES NFR BLD AUTO: 0.4 % (ref 0–0.5)
LYMPHOCYTES # BLD AUTO: 1 K/UL (ref 1–4.8)
LYMPHOCYTES NFR BLD: 11.5 % (ref 18–48)
MAGNESIUM SERPL-MCNC: 1.8 MG/DL (ref 1.6–2.6)
MCH RBC QN AUTO: 24.5 PG (ref 27–31)
MCHC RBC AUTO-ENTMCNC: 30.8 G/DL (ref 32–36)
MCV RBC AUTO: 80 FL (ref 82–98)
MONOCYTES # BLD AUTO: 0.7 K/UL (ref 0.3–1)
MONOCYTES NFR BLD: 8.3 % (ref 4–15)
NEUTROPHILS # BLD AUTO: 6.9 K/UL (ref 1.8–7.7)
NEUTROPHILS NFR BLD: 77.5 % (ref 38–73)
NRBC BLD-RTO: 0 /100 WBC
PLATELET # BLD AUTO: 138 K/UL (ref 150–450)
PMV BLD AUTO: ABNORMAL FL (ref 9.2–12.9)
POTASSIUM SERPL-SCNC: 3.8 MMOL/L (ref 3.5–5.1)
PROT SERPL-MCNC: 6.1 G/DL (ref 6–8.4)
RBC # BLD AUTO: 3.31 M/UL (ref 4–5.4)
SODIUM SERPL-SCNC: 138 MMOL/L (ref 136–145)
WBC # BLD AUTO: 8.93 K/UL (ref 3.9–12.7)

## 2025-02-01 PROCEDURE — 85025 COMPLETE CBC W/AUTO DIFF WBC: CPT | Performed by: NURSE PRACTITIONER

## 2025-02-01 PROCEDURE — 36415 COLL VENOUS BLD VENIPUNCTURE: CPT | Performed by: NURSE PRACTITIONER

## 2025-02-01 PROCEDURE — 25000003 PHARM REV CODE 250: Performed by: NURSE PRACTITIONER

## 2025-02-01 PROCEDURE — 80053 COMPREHEN METABOLIC PANEL: CPT | Performed by: NURSE PRACTITIONER

## 2025-02-01 PROCEDURE — 63600175 PHARM REV CODE 636 W HCPCS: Performed by: NURSE PRACTITIONER

## 2025-02-01 PROCEDURE — 21400001 HC TELEMETRY ROOM

## 2025-02-01 PROCEDURE — 83735 ASSAY OF MAGNESIUM: CPT | Performed by: NURSE PRACTITIONER

## 2025-02-01 RX ADMIN — PANTOPRAZOLE SODIUM 40 MG: 40 INJECTION, POWDER, LYOPHILIZED, FOR SOLUTION INTRAVENOUS at 08:02

## 2025-02-01 RX ADMIN — OXYCODONE 5 MG: 5 TABLET ORAL at 08:02

## 2025-02-01 RX ADMIN — METHOCARBAMOL 500 MG: 500 TABLET ORAL at 08:02

## 2025-02-01 NOTE — PROGRESS NOTES
[facility] Medicine  Progress Note    Patient Name: Janay Mathis  MRN: [id number]  Patient Class: IP- Inpatient   Admission Date: 1/30/2025  Length of Stay: 2 days  Attending Physician: Mc Howell,*  Primary Care Provider: Rahul Del Rio MD        Subjective     Principal Problem:Rectal bleeding        HPI:  Janay Mathis is a 44 y.o. female with a PMHx of anemia requiring multiple blood transfusions, AUB, h pylori gastritis, iron deficiency, vitamin B12 deficiency, HTN, depression, PTSD, and bipolar disorder who presents to the ED for evaluation of rectal bleeding for 3-4 weeks. Reports intermittent issues with rectal bleeding since October, noting it improves but never completely stops. Describes 1 episode of hematochezia daily. Endorses fatigue, lightheadedness, generalized weakness, SOB worse with exertion, and near syncope over the past week. States this is typical for her when her blood counts drop too low. She denies abdominal pain, diarrhea, CP, fever/chills, cough, hematuria, or dysuria. She reports chronic, intermittent nausea and vomiting but no worsening from her baseline. Denies hematemesis.     Of note patient recently presented to [facility] on 1/1/25 with symptomatic anemia (Hgb 4.6) and hematochezia and received 2u PRBCs and left AMA. Reports last iron infusion was 1/7.    In the ED, patient tachycardic otherwise vitals stable, afebrile. CBC with Hgb 3.9 and plt 144. Chloride 113. Bicarb 21. Mag 1.9. Lipase WNL. PT/INR and PTT WNL. Type & screen obtained. BNP 38. High sensitivity troponin <3. EKG shows SR w/ incomplete R BBB, 100 bpm, no ST elevation or depression. UPT negative. CXR with interstitial findings may reflect edema noting accentuation by habitus. No large focal consolidation. CTA abd/pelvis with no contrast extravasation into bowel or elsewhere to indicate site of active gastrointestinal hemorrhage. No findings to suggest obstructive uropathy. The  patient received 80mg IV protonix, 1L NS, and is currently received 3u PRBCs.    Overview/Hospital Course:  44 y.o. female with a PMHx of JORGE requiring multiple blood transfusions and IV iron infusion last dose 1/7/25 per patient, h pylori gastritis(treated), vitamin B12 deficiency, HTN, depression, PTSD, and bipolar disorder who was admitted with symptomatic anemia . Reports on and off episodes of bright red bleeding per rectum.  Hemoglobin was 3.9 on admit.  Status post 5 unit PRBC transfusion overnight.  Hemoglobin at 8.1  CTA abdomen/pelvis with no active extravasation.  GI was consulted.  Plan for C scope on Monday.  Clear liquid diet on Sunday Obtain CTA A/P if patient develops signs of overt bleeding/hemodynamic instability. No further episodes of bleeding    Interval History: no further episodes of bleeding overnight    Review of Systems  Objective:     Vital Signs (Most Recent):  Temp: 98.3 °F (36.8 °C) (02/01/25 1129)  Pulse: 73 (02/01/25 1129)  Resp: 18 (02/01/25 0507)  BP: 116/74 (02/01/25 1129)  SpO2: 96 % (02/01/25 1129) Vital Signs (24h Range):  Temp:  [98 °F (36.7 °C)-99 °F (37.2 °C)] 98.3 °F (36.8 °C)  Pulse:  [60-82] 73  Resp:  [16-22] 18  SpO2:  [95 %-99 %] 96 %  BP: (102-128)/(65-75) 116/74     Weight: 81.6 kg (180 lb)  Body mass index is 34.01 kg/m².    Intake/Output Summary (Last 24 hours) at 2/1/2025 1145  Last data filed at 1/31/2025 1930  Gross per 24 hour   Intake 716.42 ml   Output --   Net 716.42 ml         Physical Exam  Constitutional:       General: She is not in acute distress.  Cardiovascular:      Rate and Rhythm: Normal rate.      Pulses: Normal pulses.   Pulmonary:      Effort: No respiratory distress.      Breath sounds: Normal breath sounds. No wheezing.   Abdominal:      General: Bowel sounds are normal. There is no distension.      Palpations: Abdomen is soft.      Tenderness: There is no abdominal tenderness.   Musculoskeletal:      Right lower leg: No edema.      Left lower  leg: No edema.   Skin:     General: Skin is warm.   Neurological:      Mental Status: She is alert and oriented to person, place, and time. Mental status is at baseline.             Significant Labs: All pertinent labs within the past 24 hours have been reviewed.    Significant Imaging: I have reviewed all pertinent imaging results/findings within the past 24 hours.      Assessment and Plan     * Rectal bleeding  Patient's hemorrhage is due to gastrointestinal bleed, patient does have a propensity for bleeding due to a platelet defect/deficiency.. Patients most recent Hgb, Hct, platelets, and INR are listed below.  Recent Labs     01/30/25  1708 01/30/25  1720 01/31/25  1325 01/31/25 2015 02/01/25  0338   HGB 3.9*   < > 6.7* 8.5* 8.1*   HCT 14.8*   < > 22.5* 27.6* 26.3*   *   < > 110* 134* 138*   INR 1.0  --   --   --   --     < > = values in this interval not displayed.       Plan  - Will trend hemoglobin/hematocrit Every 8 hours x24 hrs  - Will monitor and correct any coagulation defects  - Will transfuse if Hgb is <7g/dl (<8g/dl in cases of active ACS) or if patient has rapid bleeding leading to hemodynamic instability    - Started on GIB pathway.  - CLD, then NPO after midnight Sunday  - Received 80mg IV Protonix in the ED, continue 40mg IV BID  - Consult GI, plan for endoscopy Monday  - Use IV anti-emetics as needed.   - CTA abd/pelvis with no contrast extravasation into bowel or elsewhere to indicate site of active gastrointestinal hemorrhage.   -No further episodes of bleeding overnight.     -EGD 11/20/24 with GI resolution of prior masses/ulcers per notes but unable to view results. Biopsies again +HP  -EGD (10/8/24): 3.5cm anterior gastric antrum mass and 2cm posterior gastric antrum mass - both biopsies +HP without dysplasia  -Colonoscopy (10/8/24): internal hemorrhoids     PTSD (post-traumatic stress disorder)        Gastroesophageal reflux disease without esophagitis  -History noted, completed h  pylori treatment.  -Continue PPI.    Essential hypertension  Patient's blood pressure range in the last 24 hours was: BP  Min: 101/54  Max: 141/67.The patient's inpatient anti-hypertensive regimen is listed below:  Current Antihypertensives  prazosin capsule 5 mg, Nightly, Oral    Plan  - BP is controlled, no changes needed to their regimen  - Not currently on antihypertensives, takes prazosin for PTSD.    B12 deficiency  -Chronic issue.  -Continue oral vitamin B12.    Bipolar affective disorder, currently active  PTSD (post-traumatic stress disorder)   -Chronic issue.  -Continue prazosin, trileptal, and zyprexa.    Acute blood loss anemia  Anemia is likely due to acute blood loss which was from rectal bleeding and Iron deficiency. Most recent hemoglobin and hematocrit are listed below.  Recent Labs     01/31/25  0703 01/31/25  0909 01/31/25  1325   HGB 5.9* 5.5* 6.7*   HCT 20.5* 19.4* 22.5*       Plan  - Monitor serial CBC: Every 8 hours x24 hrs  - Transfuse PRBC if patient becomes hemodynamically unstable, symptomatic or H/H drops below 7/21.  - Patient has received 2 units of PRBCs on 1/30  and 2units 1/31  - Patient's anemia is currently stable  - Continue oral iron and vitamin B12.      VTE Risk Mitigation (From admission, onward)           Ordered     IP VTE LOW RISK PATIENT  Once         01/30/25 1903     Place sequential compression device  Until discontinued         01/30/25 1903                    Discharge Planning   MARTIN:      Code Status: Full Code   Medical Readiness for Discharge Date:   Discharge Plan A: Home                        Mc Howell MD  Department of Hospital Medicine   [facility]

## 2025-02-01 NOTE — ASSESSMENT & PLAN NOTE
Patient's hemorrhage is due to gastrointestinal bleed, patient does have a propensity for bleeding due to a platelet defect/deficiency.. Patients most recent Hgb, Hct, platelets, and INR are listed below.  Recent Labs     01/30/25  1708 01/30/25  1720 01/31/25  1325 01/31/25 2015 02/01/25  0338   HGB 3.9*   < > 6.7* 8.5* 8.1*   HCT 14.8*   < > 22.5* 27.6* 26.3*   *   < > 110* 134* 138*   INR 1.0  --   --   --   --     < > = values in this interval not displayed.       Plan  - Will trend hemoglobin/hematocrit Every 8 hours x24 hrs  - Will monitor and correct any coagulation defects  - Will transfuse if Hgb is <7g/dl (<8g/dl in cases of active ACS) or if patient has rapid bleeding leading to hemodynamic instability    - Started on GIB pathway.  - CLD, then NPO after midnight Sunday  - Received 80mg IV Protonix in the ED, continue 40mg IV BID  - Consult GI, plan for endoscopy Monday  - Use IV anti-emetics as needed.   - CTA abd/pelvis with no contrast extravasation into bowel or elsewhere to indicate site of active gastrointestinal hemorrhage.   -No further episodes of bleeding overnight.     -EGD 11/20/24 with GI resolution of prior masses/ulcers per notes but unable to view results. Biopsies again +HP  -EGD (10/8/24): 3.5cm anterior gastric antrum mass and 2cm posterior gastric antrum mass - both biopsies +HP without dysplasia  -Colonoscopy (10/8/24): internal hemorrhoids

## 2025-02-01 NOTE — SUBJECTIVE & OBJECTIVE
Interval History: no further episodes of bleeding overnight    Review of Systems  Objective:     Vital Signs (Most Recent):  Temp: 98.3 °F (36.8 °C) (02/01/25 1129)  Pulse: 73 (02/01/25 1129)  Resp: 18 (02/01/25 0507)  BP: 116/74 (02/01/25 1129)  SpO2: 96 % (02/01/25 1129) Vital Signs (24h Range):  Temp:  [98 °F (36.7 °C)-99 °F (37.2 °C)] 98.3 °F (36.8 °C)  Pulse:  [60-82] 73  Resp:  [16-22] 18  SpO2:  [95 %-99 %] 96 %  BP: (102-128)/(65-75) 116/74     Weight: 81.6 kg (180 lb)  Body mass index is 34.01 kg/m².    Intake/Output Summary (Last 24 hours) at 2/1/2025 1145  Last data filed at 1/31/2025 1930  Gross per 24 hour   Intake 716.42 ml   Output --   Net 716.42 ml         Physical Exam  Constitutional:       General: She is not in acute distress.  Cardiovascular:      Rate and Rhythm: Normal rate.      Pulses: Normal pulses.   Pulmonary:      Effort: No respiratory distress.      Breath sounds: Normal breath sounds. No wheezing.   Abdominal:      General: Bowel sounds are normal. There is no distension.      Palpations: Abdomen is soft.      Tenderness: There is no abdominal tenderness.   Musculoskeletal:      Right lower leg: No edema.      Left lower leg: No edema.   Skin:     General: Skin is warm.   Neurological:      Mental Status: She is alert and oriented to person, place, and time. Mental status is at baseline.             Significant Labs: All pertinent labs within the past 24 hours have been reviewed.    Significant Imaging: I have reviewed all pertinent imaging results/findings within the past 24 hours.

## 2025-02-02 LAB
ALBUMIN SERPL BCP-MCNC: 3.3 G/DL (ref 3.5–5.2)
ALP SERPL-CCNC: 95 U/L (ref 40–150)
ALT SERPL W/O P-5'-P-CCNC: 19 U/L (ref 10–44)
ANION GAP SERPL CALC-SCNC: 7 MMOL/L (ref 8–16)
AST SERPL-CCNC: 21 U/L (ref 10–40)
BASOPHILS # BLD AUTO: 0.03 K/UL (ref 0–0.2)
BASOPHILS NFR BLD: 0.4 % (ref 0–1.9)
BILIRUB SERPL-MCNC: 0.7 MG/DL (ref 0.1–1)
BUN SERPL-MCNC: 10 MG/DL (ref 6–20)
CALCIUM SERPL-MCNC: 8.5 MG/DL (ref 8.7–10.5)
CHLORIDE SERPL-SCNC: 113 MMOL/L (ref 95–110)
CO2 SERPL-SCNC: 19 MMOL/L (ref 23–29)
CREAT SERPL-MCNC: 0.8 MG/DL (ref 0.5–1.4)
DIFFERENTIAL METHOD BLD: ABNORMAL
EOSINOPHIL # BLD AUTO: 0.2 K/UL (ref 0–0.5)
EOSINOPHIL NFR BLD: 3.2 % (ref 0–8)
ERYTHROCYTE [DISTWIDTH] IN BLOOD BY AUTOMATED COUNT: 21.5 % (ref 11.5–14.5)
ERYTHROCYTE [DISTWIDTH] IN BLOOD BY AUTOMATED COUNT: 22.1 % (ref 11.5–14.5)
EST. GFR  (NO RACE VARIABLE): >60 ML/MIN/1.73 M^2
GLUCOSE SERPL-MCNC: 119 MG/DL (ref 70–110)
HCT VFR BLD AUTO: 27.9 % (ref 37–48.5)
HCT VFR BLD AUTO: 28.8 % (ref 37–48.5)
HGB BLD-MCNC: 8.2 G/DL (ref 12–16)
HGB BLD-MCNC: 8.4 G/DL (ref 12–16)
IMM GRANULOCYTES # BLD AUTO: 0.03 K/UL (ref 0–0.04)
IMM GRANULOCYTES NFR BLD AUTO: 0.4 % (ref 0–0.5)
LYMPHOCYTES # BLD AUTO: 1.2 K/UL (ref 1–4.8)
LYMPHOCYTES NFR BLD: 18.2 % (ref 18–48)
MAGNESIUM SERPL-MCNC: 1.9 MG/DL (ref 1.6–2.6)
MCH RBC QN AUTO: 23.5 PG (ref 27–31)
MCH RBC QN AUTO: 23.6 PG (ref 27–31)
MCHC RBC AUTO-ENTMCNC: 29.2 G/DL (ref 32–36)
MCHC RBC AUTO-ENTMCNC: 29.4 G/DL (ref 32–36)
MCV RBC AUTO: 80 FL (ref 82–98)
MCV RBC AUTO: 80 FL (ref 82–98)
MONOCYTES # BLD AUTO: 0.7 K/UL (ref 0.3–1)
MONOCYTES NFR BLD: 10.1 % (ref 4–15)
NEUTROPHILS # BLD AUTO: 4.6 K/UL (ref 1.8–7.7)
NEUTROPHILS NFR BLD: 67.7 % (ref 38–73)
NRBC BLD-RTO: 0 /100 WBC
PLATELET # BLD AUTO: 171 K/UL (ref 150–450)
PLATELET # BLD AUTO: 178 K/UL (ref 150–450)
PMV BLD AUTO: ABNORMAL FL (ref 9.2–12.9)
PMV BLD AUTO: ABNORMAL FL (ref 9.2–12.9)
POTASSIUM SERPL-SCNC: 3.8 MMOL/L (ref 3.5–5.1)
PROT SERPL-MCNC: 6.4 G/DL (ref 6–8.4)
RBC # BLD AUTO: 3.47 M/UL (ref 4–5.4)
RBC # BLD AUTO: 3.58 M/UL (ref 4–5.4)
SODIUM SERPL-SCNC: 139 MMOL/L (ref 136–145)
WBC # BLD AUTO: 6.81 K/UL (ref 3.9–12.7)
WBC # BLD AUTO: 9.39 K/UL (ref 3.9–12.7)

## 2025-02-02 PROCEDURE — 83735 ASSAY OF MAGNESIUM: CPT | Performed by: NURSE PRACTITIONER

## 2025-02-02 PROCEDURE — 63600175 PHARM REV CODE 636 W HCPCS: Performed by: NURSE PRACTITIONER

## 2025-02-02 PROCEDURE — 80053 COMPREHEN METABOLIC PANEL: CPT | Performed by: NURSE PRACTITIONER

## 2025-02-02 PROCEDURE — 36415 COLL VENOUS BLD VENIPUNCTURE: CPT | Performed by: STUDENT IN AN ORGANIZED HEALTH CARE EDUCATION/TRAINING PROGRAM

## 2025-02-02 PROCEDURE — 85025 COMPLETE CBC W/AUTO DIFF WBC: CPT | Performed by: NURSE PRACTITIONER

## 2025-02-02 PROCEDURE — 21400001 HC TELEMETRY ROOM

## 2025-02-02 PROCEDURE — 36415 COLL VENOUS BLD VENIPUNCTURE: CPT | Performed by: NURSE PRACTITIONER

## 2025-02-02 PROCEDURE — 25000003 PHARM REV CODE 250: Performed by: NURSE PRACTITIONER

## 2025-02-02 PROCEDURE — 25000003 PHARM REV CODE 250

## 2025-02-02 PROCEDURE — 85027 COMPLETE CBC AUTOMATED: CPT | Performed by: STUDENT IN AN ORGANIZED HEALTH CARE EDUCATION/TRAINING PROGRAM

## 2025-02-02 RX ADMIN — OXYCODONE 5 MG: 5 TABLET ORAL at 07:02

## 2025-02-02 RX ADMIN — METHOCARBAMOL 500 MG: 500 TABLET ORAL at 06:02

## 2025-02-02 RX ADMIN — POLYETHYLENE GLYCOL 3350, SODIUM SULFATE ANHYDROUS, SODIUM BICARBONATE, SODIUM CHLORIDE, POTASSIUM CHLORIDE 4000 ML: 236; 22.74; 6.74; 5.86; 2.97 POWDER, FOR SOLUTION ORAL at 06:02

## 2025-02-02 RX ADMIN — OXYCODONE 5 MG: 5 TABLET ORAL at 12:02

## 2025-02-02 RX ADMIN — ONDANSETRON 4 MG: 2 INJECTION INTRAMUSCULAR; INTRAVENOUS at 04:02

## 2025-02-02 RX ADMIN — OXYCODONE 5 MG: 5 TABLET ORAL at 06:02

## 2025-02-02 RX ADMIN — PANTOPRAZOLE SODIUM 40 MG: 40 INJECTION, POWDER, LYOPHILIZED, FOR SOLUTION INTRAVENOUS at 10:02

## 2025-02-02 NOTE — SUBJECTIVE & OBJECTIVE
Interval History: 2 episodes of bright red bleeding overnight    Review of Systems  Objective:     Vital Signs (Most Recent):  Temp: 98.1 °F (36.7 °C) (02/02/25 1236)  Pulse: 79 (02/02/25 1441)  Resp: 18 (02/02/25 1236)  BP: (!) 151/81 (02/02/25 1236)  SpO2: 100 % (02/02/25 1236) Vital Signs (24h Range):  Temp:  [97.7 °F (36.5 °C)-98.1 °F (36.7 °C)] 98.1 °F (36.7 °C)  Pulse:  [59-79] 79  Resp:  [18-20] 18  SpO2:  [95 %-100 %] 100 %  BP: (115-151)/(66-81) 151/81     Weight: 81.6 kg (180 lb)  Body mass index is 34.01 kg/m².    Intake/Output Summary (Last 24 hours) at 2/2/2025 1618  Last data filed at 2/2/2025 0930  Gross per 24 hour   Intake 610 ml   Output --   Net 610 ml         Physical Exam  Constitutional:       General: She is not in acute distress.  Cardiovascular:      Rate and Rhythm: Normal rate.      Pulses: Normal pulses.   Pulmonary:      Effort: No respiratory distress.      Breath sounds: Normal breath sounds. No wheezing.   Abdominal:      General: Bowel sounds are normal. There is no distension.      Palpations: Abdomen is soft.      Tenderness: There is no abdominal tenderness.   Musculoskeletal:      Right lower leg: No edema.      Left lower leg: No edema.   Neurological:      Mental Status: She is alert and oriented to person, place, and time. Mental status is at baseline.             Significant Labs: All pertinent labs within the past 24 hours have been reviewed.    Significant Imaging: I have reviewed all pertinent imaging results/findings within the past 24 hours.

## 2025-02-02 NOTE — ASSESSMENT & PLAN NOTE
Patient's hemorrhage is due to gastrointestinal bleed, patient does have a propensity for bleeding due to a platelet defect/deficiency.. Patients most recent Hgb, Hct, platelets, and INR are listed below.  Recent Labs     01/30/25  1708 01/30/25  1720 02/01/25  0338 02/02/25  0228 02/02/25  1432   HGB 3.9*   < > 8.1* 8.4* 8.2*   HCT 14.8*   < > 26.3* 28.8* 27.9*   *   < > 138* 171 178   INR 1.0  --   --   --   --     < > = values in this interval not displayed.       Plan  - Will trend hemoglobin/hematocrit Every 8 hours x24 hrs  - Will monitor and correct any coagulation defects  - Will transfuse if Hgb is <7g/dl (<8g/dl in cases of active ACS) or if patient has rapid bleeding leading to hemodynamic instability    - Started on GIB pathway.  - CLD, then NPO after midnight Sunday  - Received 80mg IV Protonix in the ED, continue 40mg IV BID  - Consult GI, plan for endoscopy Monday  - Use IV anti-emetics as needed.   - CTA abd/pelvis with no contrast extravasation into bowel or elsewhere to indicate site of active gastrointestinal hemorrhage.   -2 episodes of bleeding overnight.     -EGD 11/20/24 with GI resolution of prior masses/ulcers per notes but unable to view results. Biopsies again +HP  -EGD (10/8/24): 3.5cm anterior gastric antrum mass and 2cm posterior gastric antrum mass - both biopsies +HP without dysplasia  -Colonoscopy (10/8/24): internal hemorrhoids

## 2025-02-02 NOTE — PROGRESS NOTES
[facility] Medicine  Progress Note    Patient Name: Janay Mathis  MRN: [id number]  Patient Class: IP- Inpatient   Admission Date: 1/30/2025  Length of Stay: 3 days  Attending Physician: Mc Howell,*  Primary Care Provider: Rahul Del Rio MD        Subjective     Principal Problem:Rectal bleeding        HPI:  Janay Mathis is a 44 y.o. female with a PMHx of anemia requiring multiple blood transfusions, AUB, h pylori gastritis, iron deficiency, vitamin B12 deficiency, HTN, depression, PTSD, and bipolar disorder who presents to the ED for evaluation of rectal bleeding for 3-4 weeks. Reports intermittent issues with rectal bleeding since October, noting it improves but never completely stops. Describes 1 episode of hematochezia daily. Endorses fatigue, lightheadedness, generalized weakness, SOB worse with exertion, and near syncope over the past week. States this is typical for her when her blood counts drop too low. She denies abdominal pain, diarrhea, CP, fever/chills, cough, hematuria, or dysuria. She reports chronic, intermittent nausea and vomiting but no worsening from her baseline. Denies hematemesis.     Of note patient recently presented to [facility] on 1/1/25 with symptomatic anemia (Hgb 4.6) and hematochezia and received 2u PRBCs and left AMA. Reports last iron infusion was 1/7.    In the ED, patient tachycardic otherwise vitals stable, afebrile. CBC with Hgb 3.9 and plt 144. Chloride 113. Bicarb 21. Mag 1.9. Lipase WNL. PT/INR and PTT WNL. Type & screen obtained. BNP 38. High sensitivity troponin <3. EKG shows SR w/ incomplete R BBB, 100 bpm, no ST elevation or depression. UPT negative. CXR with interstitial findings may reflect edema noting accentuation by habitus. No large focal consolidation. CTA abd/pelvis with no contrast extravasation into bowel or elsewhere to indicate site of active gastrointestinal hemorrhage. No findings to suggest obstructive uropathy. The  patient received 80mg IV protonix, 1L NS, and is currently received 3u PRBCs.    Overview/Hospital Course:  44 y.o. female with a PMHx of JORGE requiring multiple blood transfusions and IV iron infusion last dose 1/7/25 per patient, h pylori gastritis(treated), vitamin B12 deficiency, HTN, depression, PTSD, and bipolar disorder who was admitted with symptomatic anemia . Reports on and off episodes of bright red bleeding per rectum.  Hemoglobin was 3.9 on admit.  Status post 5 unit PRBC transfusion overnight.  Hemoglobin at 8.1  CTA abdomen/pelvis with no active extravasation.  GI was consulted.  Plan for C scope on Monday.  Clear liquid diet on Sunday Obtain CTA A/P if patient develops signs of overt bleeding/hemodynamic instability. 2 episodes of bright red bleeding per rectum last night and this am    Interval History: 2 episodes of bright red bleeding overnight    Review of Systems  Objective:     Vital Signs (Most Recent):  Temp: 98.1 °F (36.7 °C) (02/02/25 1236)  Pulse: 79 (02/02/25 1441)  Resp: 18 (02/02/25 1236)  BP: (!) 151/81 (02/02/25 1236)  SpO2: 100 % (02/02/25 1236) Vital Signs (24h Range):  Temp:  [97.7 °F (36.5 °C)-98.1 °F (36.7 °C)] 98.1 °F (36.7 °C)  Pulse:  [59-79] 79  Resp:  [18-20] 18  SpO2:  [95 %-100 %] 100 %  BP: (115-151)/(66-81) 151/81     Weight: 81.6 kg (180 lb)  Body mass index is 34.01 kg/m².    Intake/Output Summary (Last 24 hours) at 2/2/2025 1618  Last data filed at 2/2/2025 0930  Gross per 24 hour   Intake 610 ml   Output --   Net 610 ml         Physical Exam  Constitutional:       General: She is not in acute distress.  Cardiovascular:      Rate and Rhythm: Normal rate.      Pulses: Normal pulses.   Pulmonary:      Effort: No respiratory distress.      Breath sounds: Normal breath sounds. No wheezing.   Abdominal:      General: Bowel sounds are normal. There is no distension.      Palpations: Abdomen is soft.      Tenderness: There is no abdominal tenderness.   Musculoskeletal:       Right lower leg: No edema.      Left lower leg: No edema.   Neurological:      Mental Status: She is alert and oriented to person, place, and time. Mental status is at baseline.             Significant Labs: All pertinent labs within the past 24 hours have been reviewed.    Significant Imaging: I have reviewed all pertinent imaging results/findings within the past 24 hours.        Assessment and Plan     * Rectal bleeding  Patient's hemorrhage is due to gastrointestinal bleed, patient does have a propensity for bleeding due to a platelet defect/deficiency.. Patients most recent Hgb, Hct, platelets, and INR are listed below.  Recent Labs     01/30/25  1708 01/30/25  1720 02/01/25  0338 02/02/25  0228 02/02/25  1432   HGB 3.9*   < > 8.1* 8.4* 8.2*   HCT 14.8*   < > 26.3* 28.8* 27.9*   *   < > 138* 171 178   INR 1.0  --   --   --   --     < > = values in this interval not displayed.       Plan  - Will trend hemoglobin/hematocrit Every 8 hours x24 hrs  - Will monitor and correct any coagulation defects  - Will transfuse if Hgb is <7g/dl (<8g/dl in cases of active ACS) or if patient has rapid bleeding leading to hemodynamic instability    - Started on GIB pathway.  - CLD, then NPO after midnight Sunday  - Received 80mg IV Protonix in the ED, continue 40mg IV BID  - Consult GI, plan for endoscopy Monday  - Use IV anti-emetics as needed.   - CTA abd/pelvis with no contrast extravasation into bowel or elsewhere to indicate site of active gastrointestinal hemorrhage.   -2 episodes of bleeding overnight.     -EGD 11/20/24 with GI resolution of prior masses/ulcers per notes but unable to view results. Biopsies again +HP  -EGD (10/8/24): 3.5cm anterior gastric antrum mass and 2cm posterior gastric antrum mass - both biopsies +HP without dysplasia  -Colonoscopy (10/8/24): internal hemorrhoids     PTSD (post-traumatic stress disorder)        Gastroesophageal reflux disease without esophagitis  -History noted, completed h  pylori treatment.  -Continue PPI.    Essential hypertension  Patient's blood pressure range in the last 24 hours was: BP  Min: 101/54  Max: 141/67.The patient's inpatient anti-hypertensive regimen is listed below:  Current Antihypertensives  prazosin capsule 5 mg, Nightly, Oral    Plan  - BP is controlled, no changes needed to their regimen  - Not currently on antihypertensives, takes prazosin for PTSD.    B12 deficiency  -Chronic issue.  -Continue oral vitamin B12.    Bipolar affective disorder, currently active  PTSD (post-traumatic stress disorder)   -Chronic issue.  -Continue prazosin, trileptal, and zyprexa.    Acute blood loss anemia  Anemia is likely due to acute blood loss which was from rectal bleeding and Iron deficiency. Most recent hemoglobin and hematocrit are listed below.  Recent Labs     01/31/25  0703 01/31/25  0909 01/31/25  1325   HGB 5.9* 5.5* 6.7*   HCT 20.5* 19.4* 22.5*       Plan  - Monitor serial CBC: Every 8 hours x24 hrs  - Transfuse PRBC if patient becomes hemodynamically unstable, symptomatic or H/H drops below 7/21.  - Patient has received 2 units of PRBCs on 1/30  and 2units 1/31  - Patient's anemia is currently stable  - Continue oral iron and vitamin B12.      VTE Risk Mitigation (From admission, onward)           Ordered     IP VTE LOW RISK PATIENT  Once         01/30/25 1903     Place sequential compression device  Until discontinued         01/30/25 1903                    Discharge Planning   MARTIN:      Code Status: Full Code   Medical Readiness for Discharge Date:   Discharge Plan A: Home                        Mc Howell MD  Department of Hospital Medicine   [facility]

## 2025-02-03 ENCOUNTER — ANESTHESIA (OUTPATIENT)
Dept: ENDOSCOPY | Facility: HOSPITAL | Age: 45
DRG: 394 | End: 2025-02-03
Payer: MEDICAID

## 2025-02-03 VITALS
TEMPERATURE: 99 F | BODY MASS INDEX: 33.99 KG/M2 | WEIGHT: 180 LBS | RESPIRATION RATE: 14 BRPM | HEIGHT: 61 IN | HEART RATE: 75 BPM | DIASTOLIC BLOOD PRESSURE: 69 MMHG | SYSTOLIC BLOOD PRESSURE: 110 MMHG | OXYGEN SATURATION: 99 %

## 2025-02-03 PROBLEM — E87.1 HYPONATREMIA: Status: ACTIVE | Noted: 2025-02-03

## 2025-02-03 PROBLEM — D69.6 THROMBOCYTOPENIA: Status: ACTIVE | Noted: 2025-02-03

## 2025-02-03 LAB
ANION GAP SERPL CALC-SCNC: 11 MMOL/L (ref 8–16)
BASOPHILS # BLD AUTO: 0.02 K/UL (ref 0–0.2)
BASOPHILS NFR BLD: 0.2 % (ref 0–1.9)
BLD PROD TYP BPU: NORMAL
BLOOD UNIT EXPIRATION DATE: NORMAL
BLOOD UNIT TYPE CODE: 5100
BLOOD UNIT TYPE: NORMAL
BUN SERPL-MCNC: 5 MG/DL (ref 6–20)
CALCIUM SERPL-MCNC: 8.4 MG/DL (ref 8.7–10.5)
CHLORIDE SERPL-SCNC: 105 MMOL/L (ref 95–110)
CLINICAL BIOCHEMIST REVIEW: NORMAL
CO2 SERPL-SCNC: 16 MMOL/L (ref 23–29)
CODING SYSTEM: NORMAL
CREAT SERPL-MCNC: 0.8 MG/DL (ref 0.5–1.4)
CROSSMATCH INTERPRETATION: NORMAL
DIFFERENTIAL METHOD BLD: ABNORMAL
DISPENSE STATUS: NORMAL
EOSINOPHIL # BLD AUTO: 0 K/UL (ref 0–0.5)
EOSINOPHIL NFR BLD: 0.1 % (ref 0–8)
ERYTHROCYTE [DISTWIDTH] IN BLOOD BY AUTOMATED COUNT: 23.3 % (ref 11.5–14.5)
EST. GFR  (NO RACE VARIABLE): >60 ML/MIN/1.73 M^2
GLUCOSE SERPL-MCNC: 83 MG/DL (ref 70–110)
HCT VFR BLD AUTO: 29.1 % (ref 37–48.5)
HGB BLD-MCNC: 8.6 G/DL (ref 12–16)
IMM GRANULOCYTES # BLD AUTO: 0.04 K/UL (ref 0–0.04)
IMM GRANULOCYTES NFR BLD AUTO: 0.4 % (ref 0–0.5)
LYMPHOCYTES # BLD AUTO: 0.5 K/UL (ref 1–4.8)
LYMPHOCYTES NFR BLD: 6 % (ref 18–48)
MCH RBC QN AUTO: 24.2 PG (ref 27–31)
MCHC RBC AUTO-ENTMCNC: 29.6 G/DL (ref 32–36)
MCV RBC AUTO: 82 FL (ref 82–98)
MONOCYTES # BLD AUTO: 0.8 K/UL (ref 0.3–1)
MONOCYTES NFR BLD: 8.4 % (ref 4–15)
NEUTROPHILS # BLD AUTO: 7.7 K/UL (ref 1.8–7.7)
NEUTROPHILS NFR BLD: 84.9 % (ref 38–73)
NRBC BLD-RTO: 0 /100 WBC
PLATELET # BLD AUTO: 225 K/UL (ref 150–450)
PLPETH BLD-MCNC: <10 NG/ML
PMV BLD AUTO: ABNORMAL FL (ref 9.2–12.9)
POPETH BLD-MCNC: <10 NG/ML
POTASSIUM SERPL-SCNC: 4.2 MMOL/L (ref 3.5–5.1)
RBC # BLD AUTO: 3.56 M/UL (ref 4–5.4)
SODIUM SERPL-SCNC: 132 MMOL/L (ref 136–145)
TRANS ERYTHROCYTES VOL PATIENT: NORMAL ML
WBC # BLD AUTO: 9.03 K/UL (ref 3.9–12.7)

## 2025-02-03 PROCEDURE — 45378 DIAGNOSTIC COLONOSCOPY: CPT | Performed by: INTERNAL MEDICINE

## 2025-02-03 PROCEDURE — 80048 BASIC METABOLIC PNL TOTAL CA: CPT | Performed by: STUDENT IN AN ORGANIZED HEALTH CARE EDUCATION/TRAINING PROGRAM

## 2025-02-03 PROCEDURE — D9220A PRA ANESTHESIA: Mod: CRNA,,, | Performed by: NURSE ANESTHETIST, CERTIFIED REGISTERED

## 2025-02-03 PROCEDURE — 25000242 PHARM REV CODE 250 ALT 637 W/ HCPCS: Performed by: STUDENT IN AN ORGANIZED HEALTH CARE EDUCATION/TRAINING PROGRAM

## 2025-02-03 PROCEDURE — 85025 COMPLETE CBC W/AUTO DIFF WBC: CPT | Performed by: NURSE PRACTITIONER

## 2025-02-03 PROCEDURE — 63600175 PHARM REV CODE 636 W HCPCS: Performed by: NURSE ANESTHETIST, CERTIFIED REGISTERED

## 2025-02-03 PROCEDURE — 94799 UNLISTED PULMONARY SVC/PX: CPT

## 2025-02-03 PROCEDURE — 94761 N-INVAS EAR/PLS OXIMETRY MLT: CPT

## 2025-02-03 PROCEDURE — 0DJ08ZZ INSPECTION OF UPPER INTESTINAL TRACT, VIA NATURAL OR ARTIFICIAL OPENING ENDOSCOPIC: ICD-10-PCS | Performed by: STUDENT IN AN ORGANIZED HEALTH CARE EDUCATION/TRAINING PROGRAM

## 2025-02-03 PROCEDURE — 45378 DIAGNOSTIC COLONOSCOPY: CPT | Mod: ,,, | Performed by: INTERNAL MEDICINE

## 2025-02-03 PROCEDURE — D9220A PRA ANESTHESIA: Mod: ANES,,, | Performed by: ANESTHESIOLOGY

## 2025-02-03 PROCEDURE — 25000003 PHARM REV CODE 250: Performed by: NURSE ANESTHETIST, CERTIFIED REGISTERED

## 2025-02-03 PROCEDURE — 63600175 PHARM REV CODE 636 W HCPCS: Performed by: NURSE PRACTITIONER

## 2025-02-03 PROCEDURE — 36415 COLL VENOUS BLD VENIPUNCTURE: CPT | Performed by: STUDENT IN AN ORGANIZED HEALTH CARE EDUCATION/TRAINING PROGRAM

## 2025-02-03 PROCEDURE — 99900035 HC TECH TIME PER 15 MIN (STAT)

## 2025-02-03 PROCEDURE — 37000008 HC ANESTHESIA 1ST 15 MINUTES: Performed by: INTERNAL MEDICINE

## 2025-02-03 PROCEDURE — 43235 EGD DIAGNOSTIC BRUSH WASH: CPT | Performed by: INTERNAL MEDICINE

## 2025-02-03 PROCEDURE — 37000009 HC ANESTHESIA EA ADD 15 MINS: Performed by: INTERNAL MEDICINE

## 2025-02-03 PROCEDURE — 0DJD8ZZ INSPECTION OF LOWER INTESTINAL TRACT, VIA NATURAL OR ARTIFICIAL OPENING ENDOSCOPIC: ICD-10-PCS | Performed by: STUDENT IN AN ORGANIZED HEALTH CARE EDUCATION/TRAINING PROGRAM

## 2025-02-03 PROCEDURE — 43235 EGD DIAGNOSTIC BRUSH WASH: CPT | Mod: 51,,, | Performed by: INTERNAL MEDICINE

## 2025-02-03 PROCEDURE — 25000003 PHARM REV CODE 250: Performed by: NURSE PRACTITIONER

## 2025-02-03 RX ORDER — LIDOCAINE HYDROCHLORIDE 20 MG/ML
INJECTION INTRAVENOUS
Status: DISCONTINUED | OUTPATIENT
Start: 2025-02-03 | End: 2025-02-03

## 2025-02-03 RX ORDER — PHENYLEPHRINE HYDROCHLORIDE 10 MG/ML
INJECTION INTRAVENOUS
Status: DISCONTINUED | OUTPATIENT
Start: 2025-02-03 | End: 2025-02-03

## 2025-02-03 RX ORDER — PROPOFOL 10 MG/ML
VIAL (ML) INTRAVENOUS
Status: DISCONTINUED | OUTPATIENT
Start: 2025-02-03 | End: 2025-02-03

## 2025-02-03 RX ORDER — PANTOPRAZOLE SODIUM 40 MG/1
40 TABLET, DELAYED RELEASE ORAL DAILY
Start: 2025-02-03

## 2025-02-03 RX ORDER — ONDANSETRON HYDROCHLORIDE 2 MG/ML
4 INJECTION, SOLUTION INTRAVENOUS DAILY PRN
Status: DISCONTINUED | OUTPATIENT
Start: 2025-02-03 | End: 2025-02-03 | Stop reason: HOSPADM

## 2025-02-03 RX ORDER — GLUCAGON 1 MG
1 KIT INJECTION
Status: DISCONTINUED | OUTPATIENT
Start: 2025-02-03 | End: 2025-02-03 | Stop reason: HOSPADM

## 2025-02-03 RX ORDER — PROPOFOL 10 MG/ML
VIAL (ML) INTRAVENOUS CONTINUOUS PRN
Status: DISCONTINUED | OUTPATIENT
Start: 2025-02-03 | End: 2025-02-03

## 2025-02-03 RX ADMIN — PROPOFOL 20 MG: 10 INJECTION, EMULSION INTRAVENOUS at 10:02

## 2025-02-03 RX ADMIN — OXYCODONE 5 MG: 5 TABLET ORAL at 04:02

## 2025-02-03 RX ADMIN — SODIUM CHLORIDE: 0.9 INJECTION, SOLUTION INTRAVENOUS at 10:02

## 2025-02-03 RX ADMIN — PSYLLIUM HUSK 1 PACKET: 3.4 POWDER ORAL at 01:02

## 2025-02-03 RX ADMIN — PROPOFOL 200 MCG/KG/MIN: 10 INJECTION, EMULSION INTRAVENOUS at 10:02

## 2025-02-03 RX ADMIN — GLYCOPYRROLATE 0.1 MG: 0.2 INJECTION, SOLUTION INTRAMUSCULAR; INTRAVENOUS at 10:02

## 2025-02-03 RX ADMIN — PROPOFOL 80 MG: 10 INJECTION, EMULSION INTRAVENOUS at 10:02

## 2025-02-03 RX ADMIN — PHENYLEPHRINE HYDROCHLORIDE 50 MCG: 10 INJECTION INTRAVENOUS at 11:02

## 2025-02-03 RX ADMIN — LIDOCAINE HYDROCHLORIDE 100 MG: 20 INJECTION INTRAVENOUS at 10:02

## 2025-02-03 RX ADMIN — PHENYLEPHRINE HYDROCHLORIDE 50 MCG: 10 INJECTION INTRAVENOUS at 10:02

## 2025-02-03 RX ADMIN — OXYCODONE 5 MG: 5 TABLET ORAL at 05:02

## 2025-02-03 RX ADMIN — PHENYLEPHRINE HYDROCHLORIDE 100 MCG: 10 INJECTION INTRAVENOUS at 10:02

## 2025-02-03 RX ADMIN — PANTOPRAZOLE SODIUM 40 MG: 40 INJECTION, POWDER, LYOPHILIZED, FOR SOLUTION INTRAVENOUS at 09:02

## 2025-02-03 RX ADMIN — ACETAMINOPHEN 650 MG: 325 TABLET ORAL at 05:02

## 2025-02-03 NOTE — CARE UPDATE
I have reviewed the chart of Janay Mathis who is hospitalized for the following:    Active Hospital Problems    Diagnosis    *Rectal bleeding    Thrombocytopenia    Hyponatremia    PTSD (post-traumatic stress disorder)    B12 deficiency    Gastroesophageal reflux disease without esophagitis    Bipolar affective disorder, currently active    Acute blood loss anemia    Essential hypertension        Luz Muñoz PA-C  Unit Based [facility]

## 2025-02-03 NOTE — PROVATION PATIENT INSTRUCTIONS
Discharge Summary/Instructions after an Endoscopic Procedure  Patient Name: Janay Mathis  Patient MRN: [id number]  Patient YOB: 1980  Monday, February 3, 2025  Erasto Reyes MD  Dear patient,  As a result of recent federal legislation (The Federal Cures Act), you may   receive lab or pathology results from your procedure in your [facility]   account before your physician is able to contact you. Your physician or   their representative will relay the results to you with their   recommendations at their soonest availability.  Thank you,  RESTRICTIONS:  During your procedure today, you received medications for sedation.  These   medications may affect your judgment, balance and coordination.  Therefore,   for 24 hours, you have the following restrictions:   - DO NOT drive a car, operate machinery, make legal/financial decisions,   sign important papers or drink alcohol.    ACTIVITY:  Today: no heavy lifting, straining or running due to procedural   sedation/anesthesia.  The following day: return to full activity including work.  DIET:  Eat and drink normally unless instructed otherwise.     TREATMENT FOR COMMON SIDE EFFECTS:  - Mild abdominal pain, nausea, belching, bloating or excessive gas:  rest,   eat lightly and use a heating pad.  - Sore Throat: treat with throat lozenges and/or gargle with warm salt   water.  - Because air was used during the procedure, expelling large amounts of air   from your rectum or belching is normal.  - If a bowel prep was taken, you may not have a bowel movement for 1-3 days.    This is normal.  SYMPTOMS TO WATCH FOR AND REPORT TO YOUR PHYSICIAN:  1. Abdominal pain or bloating, other than gas cramps.  2. Chest pain.  3. Back pain.  4. Signs of infection such as: chills or fever occurring within 24 hours   after the procedure.  5. Rectal bleeding, which would show as bright red, maroon, or black stools.   (A tablespoon of blood from the rectum is not serious, especially if    hemorrhoids are present.)  6. Vomiting.  7. Weakness or dizziness.  GO DIRECTLY TO THE NEAREST EMERGENCY ROOM IF YOU HAVE ANY OF THE FOLLOWING:      Difficulty breathing              Chills and/or fever over 101 F   Persistent vomiting and/or vomiting blood   Severe abdominal pain   Severe chest pain   Black, tarry stools   Bleeding- more than one tablespoon   Any other symptom or condition that you feel may need urgent attention  Your doctor recommends these additional instructions:  If any biopsies were taken, your doctors clinic will contact you in 1 to 2   weeks with any results.  - Return patient to hospital [facility] for ongoing care.   - Advance diet as tolerated.   - Continue present medications.  For questions, problems or results please call your physician - Erasto Reyes MD at Work:  [phone].  [facility], EMERGENCY ROOM PHONE NUMBER: [phone]  IF A COMPLICATION OR EMERGENCY SITUATION ARISES AND YOU ARE UNABLE TO REACH   YOUR PHYSICIAN - GO DIRECTLY TO THE EMERGENCY ROOM.  Erasto Reyes MD  2/3/2025 10:46:05 AM  This report has been verified and signed electronically.  Dear patient,  As a result of recent federal legislation (The Federal Cures Act), you may   receive lab or pathology results from your procedure in your [facility]   account before your physician is able to contact you. Your physician or   their representative will relay the results to you with their   recommendations at their soonest availability.  Thank you,  PROVATION

## 2025-02-03 NOTE — NURSING TRANSFER
Nursing Transfer Note      Reason patient is being transferred: Post procedure    Transfer To: 625    Transfer via stretcher    Transfer with cardiac monitoring    Transported by PCT    Transfer Vital Signs:See flowsheet    Order for Tele Monitor? Yes    Telemetry: Box Number [id number]    Medicines sent: None    Any special needs or follow-up needed: Routine    Patient belongings transferred with patient:  None    Chart send with patient: Yes    Notified: Sister    Patient reassessed at: 2/3/2025 0716

## 2025-02-03 NOTE — DISCHARGE SUMMARY
[facility] Medicine  Discharge Summary      Patient Name: Janay Mathis  MRN: [id number]  SOPHIE: [id number]  Patient Class: IP- Inpatient  Admission Date: 1/30/2025  Hospital Length of Stay: 4 days  Discharge Date and Time: No discharge date for patient encounter.  Attending Physician: Mc Howell,*   Discharging Provider: Mc Howell MD  Primary Care Provider: Rahul Del Rio MD  [facility] Medicine Team: [facility] Mc Howell MD  Primary Care Team: [facility]    HPI:   Janay Mathis is a 44 y.o. female with a PMHx of anemia requiring multiple blood transfusions, AUB, h pylori gastritis, iron deficiency, vitamin B12 deficiency, HTN, depression, PTSD, and bipolar disorder who presents to the ED for evaluation of rectal bleeding for 3-4 weeks. Reports intermittent issues with rectal bleeding since October, noting it improves but never completely stops. Describes 1 episode of hematochezia daily. Endorses fatigue, lightheadedness, generalized weakness, SOB worse with exertion, and near syncope over the past week. States this is typical for her when her blood counts drop too low. She denies abdominal pain, diarrhea, CP, fever/chills, cough, hematuria, or dysuria. She reports chronic, intermittent nausea and vomiting but no worsening from her baseline. Denies hematemesis.     Of note patient recently presented to [facility] on 1/[facility]/25 with symptomatic anemia (Hgb 4.6) and hematochezia and received 2u PRBCs and left AMA. Reports last iron infusion was 1/7.    In the ED, patient tachycardic otherwise vitals stable, afebrile. CBC with Hgb 3.9 and plt 144. Chloride 113. Bicarb 21. Mag 1.9. Lipase WNL. PT/INR and PTT WNL. Type & screen obtained. BNP 38. High sensitivity troponin <3. EKG shows SR w/ incomplete R BBB, 100 bpm, no ST elevation or depression. UPT negative. CXR with interstitial findings may reflect edema noting accentuation by habitus. No large focal  consolidation. CTA abd/pelvis with no contrast extravasation into bowel or elsewhere to indicate site of active gastrointestinal hemorrhage. No findings to suggest obstructive uropathy. The patient received 80mg IV protonix, 1L NS, and is currently received 3u PRBCs.    Procedure(s) (LRB):  EGD (ESOPHAGOGASTRODUODENOSCOPY) (N/A)  COLONOSCOPY (N/A)      Hospital Course:   44 y.o. female with a PMHx of JORGE requiring multiple blood transfusions and IV iron infusion last dose 1/7/25 per patient, h pylori gastritis(treated), vitamin B12 deficiency, HTN, depression, PTSD, and bipolar disorder who was admitted with symptomatic anemia . Reports on and off episodes of bright red bleeding per rectum.  Hemoglobin was 3.9 on admit.  Status post 5 unit PRBC transfusion overnight.  Hemoglobin at 8.6  CTA abdomen/pelvis with no active extravasation.  GI was consulted.  S/p EGD and colonoscopy on 02/03/25 with Normal esophagus/stomach/duodenum. External hemorrhoids Repeat c scope in 10 years Initiated on metamucil. Continue iron and b12 supp To follow up with PCP in 1 week           Goals of Care Treatment Preferences:  Code Status: Full Code      SDOH Screening:  The patient was screened for utility difficulties, food insecurity, transport difficulties, housing insecurity, and interpersonal safety and there were no concerns identified this admission.     Consults:   Consults (From admission, onward)          Status Ordering Provider     Inpatient consult to Gastroenterology  Once        Provider:  (Not yet assigned)    Completed MARK THOMPSON            * Rectal bleeding  Patient's hemorrhage is due to gastrointestinal bleed, patient does have a propensity for bleeding due to a platelet defect/deficiency.. Patients most recent Hgb, Hct, platelets, and INR are listed below.  Recent Labs     01/30/25  1708 01/30/25  1720 02/01/25  0338 02/02/25  0228 02/02/25  1432   HGB 3.9*   < > 8.1* 8.4* 8.2*   HCT 14.8*   < > 26.3* 28.8*  27.9*   *   < > 138* 171 178   INR 1.0  --   --   --   --     < > = values in this interval not displayed.       Plan  - Will trend hemoglobin/hematocrit Every 8 hours x24 hrs  - Will monitor and correct any coagulation defects  - Will transfuse if Hgb is <7g/dl (<8g/dl in cases of active ACS) or if patient has rapid bleeding leading to hemodynamic instability    - Started on GIB pathway.  - CLD, then NPO after midnight Sunday  - Received 80mg IV Protonix in the ED, continue 40mg IV BID  - Consult GI, plan for endoscopy Monday  - Use IV anti-emetics as needed.   - CTA abd/pelvis with no contrast extravasation into bowel or elsewhere to indicate site of active gastrointestinal hemorrhage.   -2 episodes of bleeding overnight.     -EGD 11/20/24 with GI resolution of prior masses/ulcers per notes but unable to view results. Biopsies again +HP  -EGD (10/8/24): 3.5cm anterior gastric antrum mass and 2cm posterior gastric antrum mass - both biopsies +HP without dysplasia  -Colonoscopy (10/8/24): internal hemorrhoids     PTSD (post-traumatic stress disorder)        Gastroesophageal reflux disease without esophagitis  -History noted, completed h pylori treatment.  -Continue PPI.    Essential hypertension  Patient's blood pressure range in the last 24 hours was: BP  Min: 101/54  Max: 141/67.The patient's inpatient anti-hypertensive regimen is listed below:  Current Antihypertensives  prazosin capsule 5 mg, Nightly, Oral    Plan  - BP is controlled, no changes needed to their regimen  - Not currently on antihypertensives, takes prazosin for PTSD.    B12 deficiency  -Chronic issue.  -Continue oral vitamin B12.    Bipolar affective disorder, currently active  PTSD (post-traumatic stress disorder)   -Chronic issue.  -Continue prazosin, trileptal, and zyprexa.    Acute blood loss anemia  Anemia is likely due to acute blood loss which was from rectal bleeding and Iron deficiency. Most recent hemoglobin and hematocrit are  listed below.  Recent Labs     01/31/25  0703 01/31/25  0909 01/31/25  1325   HGB 5.9* 5.5* 6.7*   HCT 20.5* 19.4* 22.5*       Plan  - Monitor serial CBC: Every 8 hours x24 hrs  - Transfuse PRBC if patient becomes hemodynamically unstable, symptomatic or H/H drops below 7/21.  - Patient has received 2 units of PRBCs on 1/30  and 2units 1/31  - Patient's anemia is currently stable  - Continue oral iron and vitamin B12.      Final Active Diagnoses:    Diagnosis Date Noted POA    PRINCIPAL PROBLEM:  Rectal bleeding [K62.5] 01/30/2025 Yes    Thrombocytopenia [D69.6] 02/03/2025 Yes    Hyponatremia [E87.1] 02/03/2025 No    PTSD (post-traumatic stress disorder) [F43.10] 01/30/2025 Yes    B12 deficiency [E53.8] 09/26/2024 Yes    Gastroesophageal reflux disease without esophagitis [K21.9] 09/26/2024 Yes    Bipolar affective disorder, currently active [F31.9] 08/08/2022 Yes    Acute blood loss anemia [D62] 08/08/2022 Yes    Essential hypertension [I10] 02/21/2019 Yes      Problems Resolved During this Admission:       Discharged Condition: good    Disposition: Home or Self Care    Follow Up:   Follow-up Information       Rahul Del Rio MD Follow up.    Specialty: Family Medicine  Contact information:  [address] [facility] [address]  [phone]                           Patient Instructions:      Diet Adult Regular     Activity as tolerated       Significant Diagnostic Studies: Labs: CMP   Recent Labs   Lab 02/02/25 0228 02/03/25  0537    132*   K 3.8 4.2   * 105   CO2 19* 16*   * 83   BUN 10 5*   CREATININE 0.8 0.8   CALCIUM 8.5* 8.4*   PROT 6.4  --    ALBUMIN 3.3*  --    BILITOT 0.7  --    ALKPHOS 95  --    AST 21  --    ALT 19  --    ANIONGAP 7* 11    and CBC   Recent Labs   Lab 02/02/25 0228 02/02/25  1432 02/03/25  0537   WBC 6.81 9.39 9.03   HGB 8.4* 8.2* 8.6*   HCT 28.8* 27.9* 29.1*    178 225       Pending Diagnostic Studies:       None           Medications:  Reconciled  Home Medications:      Medication List        START taking these medications      psyllium husk (aspartame) 3.4 gram Pwpk packet  Commonly known as: METAMUCIL  Take 1 packet by mouth once daily.  Start taking on: February 4, 2025            CHANGE how you take these medications      pantoprazole 40 MG tablet  Commonly known as: PROTONIX  Take 1 tablet (40 mg total) by mouth once daily.  What changed: when to take this            CONTINUE taking these medications      cyanocobalamin 500 MCG tablet  Take 500 mcg by mouth once daily.     ferrous sulfate 300 mg (60 mg iron)/5 mL syrup  Take 300 mg by mouth once daily.     OLANZapine 2.5 MG tablet  Commonly known as: ZyPREXA  Take 1 tablet by mouth every evening.     OXcarbazepine 300 MG Tab  Commonly known as: TRILEPTAL  Take 1 tablet by mouth 2 (two) times daily.     prazosin 5 MG capsule  Commonly known as: MINIPRESS  Take 5 mg by mouth every evening.     zolpidem 5 MG Tab  Commonly known as: AMBIEN  Take 5 mg by mouth nightly as needed (INSOMMNIA).            STOP taking these medications      FLUoxetine 10 MG capsule              Indwelling Lines/Drains at time of discharge:   Lines/Drains/Airways       None                   Time spent on the discharge of patient: 35 minutes         Mc Howell MD  Department of Hospital Medicine  [facility] Surg

## 2025-02-03 NOTE — PROVATION PATIENT INSTRUCTIONS
Discharge Summary/Instructions after an Endoscopic Procedure  Patient Name: Janay Mathis  Patient MRN: [id number]  Patient YOB: 1980  Monday, February 3, 2025  Erasto Reyes MD  Dear patient,  As a result of recent federal legislation (The Federal Cures Act), you may   receive lab or pathology results from your procedure in your [facility]   account before your physician is able to contact you. Your physician or   their representative will relay the results to you with their   recommendations at their soonest availability.  Thank you,  RESTRICTIONS:  During your procedure today, you received medications for sedation.  These   medications may affect your judgment, balance and coordination.  Therefore,   for 24 hours, you have the following restrictions:   - DO NOT drive a car, operate machinery, make legal/financial decisions,   sign important papers or drink alcohol.    ACTIVITY:  Today: no heavy lifting, straining or running due to procedural   sedation/anesthesia.  The following day: return to full activity including work.  DIET:  Eat and drink normally unless instructed otherwise.     TREATMENT FOR COMMON SIDE EFFECTS:  - Mild abdominal pain, nausea, belching, bloating or excessive gas:  rest,   eat lightly and use a heating pad.  - Sore Throat: treat with throat lozenges and/or gargle with warm salt   water.  - Because air was used during the procedure, expelling large amounts of air   from your rectum or belching is normal.  - If a bowel prep was taken, you may not have a bowel movement for 1-3 days.    This is normal.  SYMPTOMS TO WATCH FOR AND REPORT TO YOUR PHYSICIAN:  1. Abdominal pain or bloating, other than gas cramps.  2. Chest pain.  3. Back pain.  4. Signs of infection such as: chills or fever occurring within 24 hours   after the procedure.  5. Rectal bleeding, which would show as bright red, maroon, or black stools.   (A tablespoon of blood from the rectum is not serious, especially if    hemorrhoids are present.)  6. Vomiting.  7. Weakness or dizziness.  GO DIRECTLY TO THE NEAREST EMERGENCY ROOM IF YOU HAVE ANY OF THE FOLLOWING:      Difficulty breathing              Chills and/or fever over 101 F   Persistent vomiting and/or vomiting blood   Severe abdominal pain   Severe chest pain   Black, tarry stools   Bleeding- more than one tablespoon   Any other symptom or condition that you feel may need urgent attention  Your doctor recommends these additional instructions:  If any biopsies were taken, your doctors clinic will contact you in 1 to 2   weeks with any results.  - Return patient to hospital cano for ongoing care.   - Advance diet as tolerated.   - Continue present medications.   - Repeat colonoscopy in 10 years for screening purposes.   - Use fiber, for example Citrucel, Fibercon, Konsyl or Metamucil.  For questions, problems or results please call your physician - Erasto Reyes MD at Work:  [phone].  [facility], EMERGENCY ROOM PHONE NUMBER: [phone]  IF A COMPLICATION OR EMERGENCY SITUATION ARISES AND YOU ARE UNABLE TO REACH   YOUR PHYSICIAN - GO DIRECTLY TO THE EMERGENCY ROOM.  Erasto Reyes MD  2/3/2025 11:12:08 AM  This report has been verified and signed electronically.  Dear patient,  As a result of recent federal legislation (The Federal Cures Act), you may   receive lab or pathology results from your procedure in your [facility]   account before your physician is able to contact you. Your physician or   their representative will relay the results to you with their   recommendations at their soonest availability.  Thank you,  PROVATION

## 2025-02-03 NOTE — PLAN OF CARE
Problem: Adult Inpatient Plan of Care  Goal: Plan of Care Review  Outcome: Progressing  Goal: Patient-Specific Goal (Individualized)  Outcome: Progressing  Goal: Absence of Hospital-Acquired Illness or Injury  Outcome: Progressing  Goal: Optimal Comfort and Wellbeing  Outcome: Progressing  Goal: Readiness for Transition of Care  Outcome: Progressing     Problem: Gastrointestinal Bleeding  Goal: Optimal Coping with Acute Illness  Outcome: Progressing  Goal: Hemostasis  Outcome: Progressing     Problem: Fall Injury Risk  Goal: Absence of Fall and Fall-Related Injury  Outcome: Progressing

## 2025-02-03 NOTE — NURSING
Patient refused meds due to unable to tolerate prior to hospital visit and currently without being able to eat with meds. Pt c/o N/V when taking meds without food. Pt currently on clear liquid diet.

## 2025-02-03 NOTE — DISCHARGE INSTRUCTIONS
Our goal at [facility] is to always give you outstanding care and exceptional service. You may receive a survey by mail, text or e-mail in the next 7-10 days from [employer] and our leadership team asking about the care you received with us. The survey should only take 5-10 minutes to complete and is very important to us.     Your feedback provides us with a way to recognize our staff who work tirelessly to provide the best care! Also, your responses help us learn how to improve when your experience was below our aspiration of excellence. We WILL use your feedback to continue making improvements to help us provide the highest quality care. We keep your personal information and feedback confidential. We appreciate your time completing this survey and can't wait to hear from you!!!     We want you to leave us today feeling like you can DEFINITELY recommend us to others! We look forward to your continued care with us! Thanks so much for choosing [facility] for your healthcare needs!

## 2025-02-03 NOTE — PLAN OF CARE
[facility]  Discharge Reassessment    Primary Care Provider: Rahul Del Rio MD    Expected Discharge Date: 2/3/2025      Sw met with pt at bedside to discuss dc planning. Pt does not require any assistance and is independent and ambulatory with ADL's. Once medically cleared for dc, pt will need a ride.    Discharge Plan A and Plan B have been determined by review of patient's clinical status, future medical and therapeutic needs, and coverage/benefits for post-acute care in coordination with multidisciplinary team members.    Reassessment (most recent)       Discharge Reassessment - 02/03/25 1620          Discharge Reassessment    Assessment Type Discharge Planning Reassessment     Did the patient's condition or plan change since previous assessment? No     Discharge Plan discussed with: Patient     Communicated MARTIN with patient/caregiver Yes     Discharge Plan A Home     Discharge Plan B Home with family     DME Needed Upon Discharge  none     Transition of Care Barriers None     Why the patient remains in the hospital Requires continued medical care        Post-Acute Status    Post-Acute Authorization Other     Other Status No Post-Acute Service Needs     Hospital Resources/Appts/Education Provided Provided patient/caregiver with written discharge plan information     Discharge Delays None known at this time                   GERSON Buck  Case Management  [phone]

## 2025-02-03 NOTE — H&P
Short Stay Endoscopy History and Physical    PCP - Rahul Del Rio MD  Referring Physician - Mc Howell MD  [address] [place], [place] [address] [id number]    Procedure - egd/colonoscopy  ASA - per anesthesia  Mallampati - per anesthesia  History of Anesthesia problems - per anesthesia  Family history Anesthesia problems -  per anesthesia   Plan of anesthesia - per anesthesia    HPI:  This is a 44 y.o. female here for evaluation of: rectal bleeding    Reflux - no  Dysphagia - no  Abdominal pain - no  Diarrhea - no    ROS:  Constitutional: No fevers, chills, No weight loss  CV: No chest pain  Pulm: No cough, No shortness of breath  Ophtho: No vision changes  GI: see HPI  Derm: No rash    Medical History:  has no past medical history on file.    Surgical History:  has no past surgical history on file.    Family History: family history is not on file..    Social History:  reports that she has never smoked. She has never used smokeless tobacco.    Review of patient's allergies indicates:   Allergen Reactions    Tramadol Itching and Swelling       Medications:   Medications Prior to Admission   Medication Sig Dispense Refill Last Dose/Taking    ferrous sulfate 300 mg (60 mg iron)/5 mL syrup Take 300 mg by mouth once daily.   Taking    pantoprazole (PROTONIX) 40 MG tablet Take 40 mg by mouth 2 (two) times daily.   Taking    cyanocobalamin 500 MCG tablet Take 500 mcg by mouth once daily.       FLUoxetine 10 MG capsule Take 10 mg by mouth.       OLANZapine (ZYPREXA) 2.5 MG tablet Take 1 tablet by mouth every evening.       OXcarbazepine (TRILEPTAL) 300 MG Tab Take 1 tablet by mouth 2 (two) times daily.       prazosin (MINIPRESS) 5 MG capsule Take 5 mg by mouth every evening.       zolpidem (AMBIEN) 5 MG Tab Take 5 mg by mouth nightly as needed (INSOMMNIA).          Physical Exam:    Vital Signs:   Vitals:    02/03/25 0749   BP: 104/67   Pulse: 73   Resp: 18   Temp: 98.6 °F (37 °C)       General Appearance: Well  appearing in no acute distress    Labs:  Lab Results   Component Value Date    WBC 9.03 02/03/2025    HGB 8.6 (L) 02/03/2025    HCT 29.1 (L) 02/03/2025     02/03/2025    CHOL 120 08/05/2024    TRIG 93 08/05/2024    HDL 57 08/05/2024    ALT 19 02/02/2025    AST 21 02/02/2025     (L) 02/03/2025    K 4.2 02/03/2025     02/03/2025    CREATININE 0.8 02/03/2025    BUN 5 (L) 02/03/2025    CO2 16 (L) 02/03/2025    TSH 0.66 08/05/2024    INR 1.0 01/30/2025    HGBA1C 4.2 (L) 08/05/2024       I have explained the risks and benefits of this endoscopic procedure to the patient including but not limited to bleeding, inflammation, infection, perforation, missing a lesion and death.      Josie Potter MD

## 2025-02-03 NOTE — TREATMENT PLAN
GI Treatment Plan    S/p EGD and colon    EGD  Impression:            - Normal esophagus.                          - Normal stomach.                          - Normal examined duodenum.                          - No specimens collected.   Recommendation:        - Return patient to [facility] for ongoing care.                          - Advance diet as tolerated.                          - Continue present medications.       Colonoscopy   Impression:            - External hemorrhoids.                          - The entire examined colon is normal.                          - No specimens collected.   Recommendation:        - Return patient to hospital [facility] for ongoing care.                          - Advance diet as tolerated.                          - Continue present medications.                          - Repeat colonoscopy in 10 years for screening                          purposes.                          - Use fiber, for example Citrucel, Fibercon,                          Konsyl or Metamucil.     GI will sign off.     Thank you for involving us in the care of Janay Mathis. Please call with any additional questions, concerns or changes in the patient's clinical status.    Martha Delaney MD  Gastroenterology Fellow, PGY IV

## 2025-02-03 NOTE — TRANSFER OF CARE
"Anesthesia Transfer of Care Note    Patient: Janay Mathis    Procedure(s) Performed: Procedure(s) (LRB):  EGD (ESOPHAGOGASTRODUODENOSCOPY) (N/A)  COLONOSCOPY (N/A)    Patient location: PACU    Anesthesia Type: general    Transport from OR: Transported from OR on room air with adequate spontaneous ventilation    Post pain: adequate analgesia    Post assessment: no apparent anesthetic complications and tolerated procedure well    Post vital signs: stable    Level of consciousness: awake, alert and oriented    Nausea/Vomiting: no nausea/vomiting    Complications: none    Transfer of care protocol was followed    Last vitals: Visit Vitals  BP (!) 96/52   Pulse 76   Temp 37 °C (98.6 °F) (Oral)   Resp 14   Ht 5' 1" (1.549 m)   Wt 81.6 kg (180 lb)   SpO2 99%   Breastfeeding No   BMI 34.01 kg/m²     "

## 2025-02-04 NOTE — PLAN OF CARE
Problem: Adult Inpatient Plan of Care  Goal: Plan of Care Review  Outcome: Adequate for Care Transition  Goal: Patient-Specific Goal (Individualized)  Outcome: Adequate for Care Transition  Goal: Absence of Hospital-Acquired Illness or Injury  Outcome: Adequate for Care Transition  Goal: Optimal Comfort and Wellbeing  Outcome: Adequate for Care Transition  Goal: Readiness for Transition of Care  Outcome: Adequate for Care Transition     Problem: Gastrointestinal Bleeding  Goal: Optimal Coping with Acute Illness  Outcome: Adequate for Care Transition  Goal: Hemostasis  Outcome: Adequate for Care Transition     Problem: Fall Injury Risk  Goal: Absence of Fall and Fall-Related Injury  Outcome: Adequate for Care Transition

## 2025-02-04 NOTE — PLAN OF CARE
APPOINTMENT:    Patient Appointment(s) scheduled with [facility] Friday Feb 14, 2025 hospital follow up at 2:00 pm

## 2025-02-04 NOTE — PROGRESS NOTES
AVS virtually reviewed with Ms Mathis in its entirety with emphasis on diet, medications, follow-up appointments and reasons to return to the ED or contact the [facility] On Call Nurse Care Line. Patient also encouraged to utilize their patient portal. Ease and convenience of use reiterated. Education complete and patient voiced understanding. All questions answered. Discharge teaching complete.

## 2025-02-04 NOTE — PLAN OF CARE
On-call CM arranged Lyft ride home for patient.     and number  Jennifer, [phone]  Make and Model  [employer]  License plate  [id number]  Request details  Rena Rahman  Date and time  2/3/25, 06:48 PM CST  Ride ID  [id number]

## 2025-02-04 NOTE — PLAN OF CARE
[facility]  Discharge Final Note    Primary Care Provider: Rahul Del Rio MD    Expected Discharge Date: 2/3/2025          Pt was discharged home by Lyft transportation. There were no services needed from case management.     Discharge Plan A and Plan B have been determined by review of patient's clinical status, future medical and therapeutic needs, and coverage/benefits for post-acute care in coordination with multidisciplinary team members.       Final Discharge Note (most recent)       Final Note - 02/04/25 0809          Final Note    Assessment Type Final Discharge Note (P)      Anticipated Discharge Disposition Home or Self Care (P)      What phone number can be called within the next 1-3 days to see how you are doing after discharge? [phone] (P)      Hospital Resources/Appts/Education Provided Provided patient/caregiver with written discharge plan information (P)         Post-Acute Status    Post-Acute Authorization Other (P)      Other Status No Post-Acute Service Needs (P)      Discharge Delays None known at this time (P)                      Important Message from Medicare             Contact Info       Rahul Del Rio MD   Specialty: Family Medicine   Relationship: PCP - General    [address] [facility] [address]   Phone: [phone]       Next Steps: Follow up          GERSON Buck  Case Management  [phone]

## 2025-02-04 NOTE — PLAN OF CARE
Problem: Adult Inpatient Plan of Care  Goal: Plan of Care Review  Outcome: Met  Goal: Patient-Specific Goal (Individualized)  Outcome: Met  Goal: Absence of Hospital-Acquired Illness or Injury  Outcome: Met  Goal: Optimal Comfort and Wellbeing  Outcome: Met  Goal: Readiness for Transition of Care  Outcome: Met     Problem: Gastrointestinal Bleeding  Goal: Optimal Coping with Acute Illness  Outcome: Met  Goal: Hemostasis  Outcome: Met     Problem: Fall Injury Risk  Goal: Absence of Fall and Fall-Related Injury  Outcome: Met  D/C instructions via virtual nurse.  Patient declined w/c transportation and ambulated to front of hospital where a Lyft ride is being provided.

## 2025-02-05 NOTE — ANESTHESIA POSTPROCEDURE EVALUATION
Anesthesia Post Evaluation    Patient: Janay Mathis    Procedure(s) Performed: Procedure(s) (LRB):  EGD (ESOPHAGOGASTRODUODENOSCOPY) (N/A)  COLONOSCOPY (N/A)    Final Anesthesia Type: general      Patient location during evaluation: PACU  Patient participation: Yes- Able to Participate  Level of consciousness: awake and alert  Post-procedure vital signs: reviewed and stable  Pain management: adequate  Airway patency: patent    PONV status at discharge: No PONV  Anesthetic complications: no      Cardiovascular status: blood pressure returned to baseline  Respiratory status: room air  Hydration status: euvolemic  Follow-up not needed.              Vitals Value Taken Time   /69 02/03/25 1520   Temp 37.2 °C (98.9 °F) 02/03/25 1520   Pulse 75 02/03/25 1520   Resp 14 02/03/25 1610   SpO2 99 % 02/03/25 1520         Event Time   Out of Recovery 11:49:00         Pain/Merary Score: No data recorded

## 2025-03-18 ENCOUNTER — HOSPITAL ENCOUNTER (OUTPATIENT)
Facility: HOSPITAL | Age: 45
Discharge: HOME | End: 2025-03-19
Attending: STUDENT IN AN ORGANIZED HEALTH CARE EDUCATION/TRAINING PROGRAM | Admitting: COLON & RECTAL SURGERY
Payer: MEDICAID

## 2025-03-18 DIAGNOSIS — K64.9 HEMORRHOIDS: ICD-10-CM

## 2025-03-18 DIAGNOSIS — R06.02 SHORTNESS OF BREATH: ICD-10-CM

## 2025-03-18 DIAGNOSIS — R07.9 CHEST PAIN: ICD-10-CM

## 2025-03-18 DIAGNOSIS — D64.9 ANEMIA: ICD-10-CM

## 2025-03-18 DIAGNOSIS — R00.0 TACHYCARDIA: ICD-10-CM

## 2025-03-18 DIAGNOSIS — K64.9 HEMORRHOIDS, UNSPECIFIED HEMORRHOID TYPE: Primary | ICD-10-CM

## 2025-03-18 PROBLEM — K92.2 GI BLEED: Status: ACTIVE | Noted: 2025-03-18

## 2025-03-18 LAB
ABO + RH BLD: NORMAL
ALBUMIN SERPL BCP-MCNC: 3.8 G/DL (ref 3.5–5.2)
ALP SERPL-CCNC: 91 U/L (ref 40–150)
ALT SERPL W/O P-5'-P-CCNC: 17 U/L (ref 10–44)
ANION GAP SERPL CALC-SCNC: 8 MMOL/L (ref 8–16)
ANISOCYTOSIS BLD QL SMEAR: ABNORMAL
ANISOCYTOSIS BLD QL SMEAR: SLIGHT
AST SERPL-CCNC: 18 U/L (ref 10–40)
BACTERIA GENITAL QL WET PREP: ABNORMAL
BASO STIPL BLD QL SMEAR: ABNORMAL
BASOPHILS # BLD AUTO: 0.02 K/UL (ref 0–0.2)
BASOPHILS # BLD AUTO: 0.04 K/UL (ref 0–0.2)
BASOPHILS # BLD AUTO: 0.04 K/UL (ref 0–0.2)
BASOPHILS NFR BLD: 0.2 % (ref 0–1.9)
BASOPHILS NFR BLD: 0.3 % (ref 0–1.9)
BASOPHILS NFR BLD: 0.3 % (ref 0–1.9)
BILIRUB SERPL-MCNC: 0.7 MG/DL (ref 0.1–1)
BLD GP AB SCN CELLS X3 SERPL QL: NORMAL
BLD PROD TYP BPU: NORMAL
BLOOD UNIT EXPIRATION DATE: NORMAL
BLOOD UNIT TYPE CODE: 5100
BLOOD UNIT TYPE: NORMAL
BNP SERPL-MCNC: 37 PG/ML (ref 0–99)
BUN SERPL-MCNC: 10 MG/DL (ref 6–20)
CALCIUM SERPL-MCNC: 8.8 MG/DL (ref 8.7–10.5)
CHLORIDE SERPL-SCNC: 110 MMOL/L (ref 95–110)
CLUE CELLS VAG QL WET PREP: ABNORMAL
CO2 SERPL-SCNC: 22 MMOL/L (ref 23–29)
CODING SYSTEM: NORMAL
CREAT SERPL-MCNC: 0.8 MG/DL (ref 0.5–1.4)
CROSSMATCH INTERPRETATION: NORMAL
DACRYOCYTES BLD QL SMEAR: ABNORMAL
DACRYOCYTES BLD QL SMEAR: ABNORMAL
DIFFERENTIAL METHOD BLD: ABNORMAL
DISPENSE STATUS: NORMAL
DOHLE BOD BLD QL SMEAR: PRESENT
EOSINOPHIL # BLD AUTO: 0 K/UL (ref 0–0.5)
EOSINOPHIL # BLD AUTO: 0.1 K/UL (ref 0–0.5)
EOSINOPHIL # BLD AUTO: 0.1 K/UL (ref 0–0.5)
EOSINOPHIL NFR BLD: 0.3 % (ref 0–8)
EOSINOPHIL NFR BLD: 0.4 % (ref 0–8)
EOSINOPHIL NFR BLD: 0.5 % (ref 0–8)
ERYTHROCYTE [DISTWIDTH] IN BLOOD BY AUTOMATED COUNT: 25.1 % (ref 11.5–14.5)
ERYTHROCYTE [DISTWIDTH] IN BLOOD BY AUTOMATED COUNT: 26.5 % (ref 11.5–14.5)
ERYTHROCYTE [DISTWIDTH] IN BLOOD BY AUTOMATED COUNT: 28.2 % (ref 11.5–14.5)
EST. GFR  (NO RACE VARIABLE): >60 ML/MIN/1.73 M^2
FERRITIN SERPL-MCNC: 6 NG/ML (ref 20–300)
FILAMENT FUNGI VAG WET PREP-#/AREA: ABNORMAL
FOLATE SERPL-MCNC: 11.8 NG/ML (ref 4–24)
GIANT PLATELETS BLD QL SMEAR: PRESENT
GIANT PLATELETS BLD QL SMEAR: PRESENT
GLUCOSE SERPL-MCNC: 95 MG/DL (ref 70–110)
HAPTOGLOB SERPL-MCNC: 154 MG/DL (ref 30–250)
HCT VFR BLD AUTO: 15.3 % (ref 37–48.5)
HCT VFR BLD AUTO: 17.5 % (ref 37–48.5)
HCT VFR BLD AUTO: 23.2 % (ref 37–48.5)
HGB BLD-MCNC: 3.8 G/DL (ref 12–16)
HGB BLD-MCNC: 4.7 G/DL (ref 12–16)
HGB BLD-MCNC: 6.7 G/DL (ref 12–16)
HYPOCHROMIA BLD QL SMEAR: ABNORMAL
HYPOCHROMIA BLD QL SMEAR: ABNORMAL
IMM GRANULOCYTES # BLD AUTO: 0.02 K/UL (ref 0–0.04)
IMM GRANULOCYTES # BLD AUTO: 0.09 K/UL (ref 0–0.04)
IMM GRANULOCYTES # BLD AUTO: 0.14 K/UL (ref 0–0.04)
IMM GRANULOCYTES NFR BLD AUTO: 0.3 % (ref 0–0.5)
IMM GRANULOCYTES NFR BLD AUTO: 0.6 % (ref 0–0.5)
IMM GRANULOCYTES NFR BLD AUTO: 0.6 % (ref 0–0.5)
IRON SERPL-MCNC: 461 UG/DL (ref 30–160)
LACTATE SERPL-SCNC: 1.4 MMOL/L (ref 0.5–2.2)
LDH SERPL L TO P-CCNC: 365 U/L (ref 110–260)
LYMPHOCYTES # BLD AUTO: 1.2 K/UL (ref 1–4.8)
LYMPHOCYTES # BLD AUTO: 1.3 K/UL (ref 1–4.8)
LYMPHOCYTES # BLD AUTO: 1.8 K/UL (ref 1–4.8)
LYMPHOCYTES NFR BLD: 15.7 % (ref 18–48)
LYMPHOCYTES NFR BLD: 7.9 % (ref 18–48)
LYMPHOCYTES NFR BLD: 8.2 % (ref 18–48)
MAGNESIUM SERPL-MCNC: 2.1 MG/DL (ref 1.6–2.6)
MCH RBC QN AUTO: 16.5 PG (ref 27–31)
MCH RBC QN AUTO: 18.5 PG (ref 27–31)
MCH RBC QN AUTO: 21.4 PG (ref 27–31)
MCHC RBC AUTO-ENTMCNC: 24.8 G/DL (ref 32–36)
MCHC RBC AUTO-ENTMCNC: 26.9 G/DL (ref 32–36)
MCHC RBC AUTO-ENTMCNC: 28.9 G/DL (ref 32–36)
MCV RBC AUTO: 66 FL (ref 82–98)
MCV RBC AUTO: 69 FL (ref 82–98)
MCV RBC AUTO: 74 FL (ref 82–98)
MONOCYTES # BLD AUTO: 0.6 K/UL (ref 0.3–1)
MONOCYTES # BLD AUTO: 0.8 K/UL (ref 0.3–1)
MONOCYTES # BLD AUTO: 1.1 K/UL (ref 0.3–1)
MONOCYTES NFR BLD: 5 % (ref 4–15)
MONOCYTES NFR BLD: 5.2 % (ref 4–15)
MONOCYTES NFR BLD: 8.2 % (ref 4–15)
NEUTROPHILS # BLD AUTO: 13.6 K/UL (ref 1.8–7.7)
NEUTROPHILS # BLD AUTO: 19.5 K/UL (ref 1.8–7.7)
NEUTROPHILS # BLD AUTO: 5.6 K/UL (ref 1.8–7.7)
NEUTROPHILS NFR BLD: 75 % (ref 38–73)
NEUTROPHILS NFR BLD: 85.3 % (ref 38–73)
NEUTROPHILS NFR BLD: 86 % (ref 38–73)
NRBC BLD-RTO: 0 /100 WBC
NRBC BLD-RTO: 0 /100 WBC
NRBC BLD-RTO: 1 /100 WBC
OHS QRS DURATION: 94 MS
OHS QTC CALCULATION: 472 MS
OVALOCYTES BLD QL SMEAR: ABNORMAL
OVALOCYTES BLD QL SMEAR: ABNORMAL
PATH REV BLD -IMP: NORMAL
PLATELET # BLD AUTO: 617 K/UL (ref 150–450)
PLATELET # BLD AUTO: 695 K/UL (ref 150–450)
PLATELET # BLD AUTO: 699 K/UL (ref 150–450)
PLATELET BLD QL SMEAR: ABNORMAL
PLATELET BLD QL SMEAR: ABNORMAL
PMV BLD AUTO: 10 FL (ref 9.2–12.9)
PMV BLD AUTO: 10.2 FL (ref 9.2–12.9)
PMV BLD AUTO: 10.3 FL (ref 9.2–12.9)
POCT GLUCOSE: 127 MG/DL (ref 70–110)
POIKILOCYTOSIS BLD QL SMEAR: ABNORMAL
POIKILOCYTOSIS BLD QL SMEAR: SLIGHT
POLYCHROMASIA BLD QL SMEAR: ABNORMAL
POLYCHROMASIA BLD QL SMEAR: ABNORMAL
POTASSIUM SERPL-SCNC: 4.1 MMOL/L (ref 3.5–5.1)
PROT SERPL-MCNC: 7.1 G/DL (ref 6–8.4)
RBC # BLD AUTO: 2.31 M/UL (ref 4–5.4)
RBC # BLD AUTO: 2.54 M/UL (ref 4–5.4)
RBC # BLD AUTO: 3.13 M/UL (ref 4–5.4)
SATURATED IRON: 97 % (ref 20–50)
SCHISTOCYTES BLD QL SMEAR: ABNORMAL
SCHISTOCYTES BLD QL SMEAR: ABNORMAL
SCHISTOCYTES BLD QL SMEAR: PRESENT
SCHISTOCYTES BLD QL SMEAR: PRESENT
SODIUM SERPL-SCNC: 140 MMOL/L (ref 136–145)
SPECIMEN OUTDATE: NORMAL
SPECIMEN SOURCE: ABNORMAL
T VAGINALIS GENITAL QL WET PREP: ABNORMAL
TOTAL IRON BINDING CAPACITY: 475 UG/DL (ref 250–450)
TOXIC GRANULES BLD QL SMEAR: PRESENT
TOXIC GRANULES BLD QL SMEAR: PRESENT
TRANS ERYTHROCYTES VOL PATIENT: NORMAL ML
TRANSFERRIN SERPL-MCNC: 321 MG/DL (ref 200–375)
TROPONIN I SERPL DL<=0.01 NG/ML-MCNC: <3 NG/L (ref 0–14)
VIT B12 SERPL-MCNC: 432 PG/ML (ref 210–950)
WBC # BLD AUTO: 15.88 K/UL (ref 3.9–12.7)
WBC # BLD AUTO: 22.61 K/UL (ref 3.9–12.7)
WBC # BLD AUTO: 7.47 K/UL (ref 3.9–12.7)
WBC #/AREA VAG WET PREP: ABNORMAL
YEAST GENITAL QL WET PREP: ABNORMAL

## 2025-03-18 PROCEDURE — 25000003 PHARM REV CODE 250

## 2025-03-18 PROCEDURE — 83605 ASSAY OF LACTIC ACID: CPT

## 2025-03-18 PROCEDURE — 85025 COMPLETE CBC W/AUTO DIFF WBC: CPT | Mod: 91

## 2025-03-18 PROCEDURE — P9021 RED BLOOD CELLS UNIT: HCPCS | Performed by: STUDENT IN AN ORGANIZED HEALTH CARE EDUCATION/TRAINING PROGRAM

## 2025-03-18 PROCEDURE — 36430 TRANSFUSION BLD/BLD COMPNT: CPT

## 2025-03-18 PROCEDURE — 93010 ELECTROCARDIOGRAM REPORT: CPT | Mod: ,,, | Performed by: INTERNAL MEDICINE

## 2025-03-18 PROCEDURE — 96374 THER/PROPH/DIAG INJ IV PUSH: CPT

## 2025-03-18 PROCEDURE — 93005 ELECTROCARDIOGRAM TRACING: CPT

## 2025-03-18 PROCEDURE — 85060 BLOOD SMEAR INTERPRETATION: CPT | Mod: ,,, | Performed by: PATHOLOGY

## 2025-03-18 PROCEDURE — 99285 EMERGENCY DEPT VISIT HI MDM: CPT | Mod: 25

## 2025-03-18 PROCEDURE — 63600175 PHARM REV CODE 636 W HCPCS

## 2025-03-18 PROCEDURE — 82746 ASSAY OF FOLIC ACID SERUM: CPT

## 2025-03-18 PROCEDURE — 82728 ASSAY OF FERRITIN: CPT

## 2025-03-18 PROCEDURE — 82962 GLUCOSE BLOOD TEST: CPT

## 2025-03-18 PROCEDURE — 83880 ASSAY OF NATRIURETIC PEPTIDE: CPT

## 2025-03-18 PROCEDURE — 80053 COMPREHEN METABOLIC PANEL: CPT

## 2025-03-18 PROCEDURE — 96376 TX/PRO/DX INJ SAME DRUG ADON: CPT

## 2025-03-18 PROCEDURE — 85025 COMPLETE CBC W/AUTO DIFF WBC: CPT

## 2025-03-18 PROCEDURE — 83615 LACTATE (LD) (LDH) ENZYME: CPT

## 2025-03-18 PROCEDURE — 83010 ASSAY OF HAPTOGLOBIN QUANT: CPT

## 2025-03-18 PROCEDURE — 82607 VITAMIN B-12: CPT

## 2025-03-18 PROCEDURE — 87210 SMEAR WET MOUNT SALINE/INK: CPT

## 2025-03-18 PROCEDURE — 96375 TX/PRO/DX INJ NEW DRUG ADDON: CPT

## 2025-03-18 PROCEDURE — 84466 ASSAY OF TRANSFERRIN: CPT

## 2025-03-18 PROCEDURE — 86920 COMPATIBILITY TEST SPIN: CPT | Performed by: STUDENT IN AN ORGANIZED HEALTH CARE EDUCATION/TRAINING PROGRAM

## 2025-03-18 PROCEDURE — 99222 1ST HOSP IP/OBS MODERATE 55: CPT | Mod: ,,, | Performed by: COLON & RECTAL SURGERY

## 2025-03-18 PROCEDURE — G0378 HOSPITAL OBSERVATION PER HR: HCPCS

## 2025-03-18 PROCEDURE — 83735 ASSAY OF MAGNESIUM: CPT

## 2025-03-18 PROCEDURE — 86850 RBC ANTIBODY SCREEN: CPT | Performed by: STUDENT IN AN ORGANIZED HEALTH CARE EDUCATION/TRAINING PROGRAM

## 2025-03-18 PROCEDURE — 84484 ASSAY OF TROPONIN QUANT: CPT

## 2025-03-18 PROCEDURE — 63600175 PHARM REV CODE 636 W HCPCS: Performed by: STUDENT IN AN ORGANIZED HEALTH CARE EDUCATION/TRAINING PROGRAM

## 2025-03-18 PROCEDURE — 25500020 PHARM REV CODE 255: Performed by: STUDENT IN AN ORGANIZED HEALTH CARE EDUCATION/TRAINING PROGRAM

## 2025-03-18 RX ORDER — TALC
6 POWDER (GRAM) TOPICAL NIGHTLY PRN
Status: DISCONTINUED | OUTPATIENT
Start: 2025-03-18 | End: 2025-03-19

## 2025-03-18 RX ORDER — ONDANSETRON 8 MG/1
8 TABLET, ORALLY DISINTEGRATING ORAL EVERY 8 HOURS PRN
Status: DISCONTINUED | OUTPATIENT
Start: 2025-03-18 | End: 2025-03-19

## 2025-03-18 RX ORDER — OXYCODONE HYDROCHLORIDE 10 MG/1
10 TABLET ORAL EVERY 6 HOURS PRN
Refills: 0 | Status: DISCONTINUED | OUTPATIENT
Start: 2025-03-18 | End: 2025-03-19 | Stop reason: HOSPADM

## 2025-03-18 RX ORDER — PROCHLORPERAZINE EDISYLATE 5 MG/ML
5 INJECTION INTRAMUSCULAR; INTRAVENOUS EVERY 6 HOURS PRN
Status: DISCONTINUED | OUTPATIENT
Start: 2025-03-18 | End: 2025-03-19

## 2025-03-18 RX ORDER — OLANZAPINE 2.5 MG/1
2.5 TABLET ORAL NIGHTLY
Status: DISCONTINUED | OUTPATIENT
Start: 2025-03-18 | End: 2025-03-18

## 2025-03-18 RX ORDER — ACETAMINOPHEN 500 MG
1000 TABLET ORAL EVERY 8 HOURS PRN
Status: DISCONTINUED | OUTPATIENT
Start: 2025-03-18 | End: 2025-03-19 | Stop reason: HOSPADM

## 2025-03-18 RX ORDER — NALOXONE HCL 0.4 MG/ML
0.02 VIAL (ML) INJECTION
Status: DISCONTINUED | OUTPATIENT
Start: 2025-03-18 | End: 2025-03-19

## 2025-03-18 RX ORDER — PANTOPRAZOLE SODIUM 40 MG/10ML
80 INJECTION, POWDER, LYOPHILIZED, FOR SOLUTION INTRAVENOUS ONCE
Status: COMPLETED | OUTPATIENT
Start: 2025-03-18 | End: 2025-03-18

## 2025-03-18 RX ORDER — MORPHINE SULFATE 4 MG/ML
4 INJECTION, SOLUTION INTRAMUSCULAR; INTRAVENOUS
Refills: 0 | Status: COMPLETED | OUTPATIENT
Start: 2025-03-18 | End: 2025-03-18

## 2025-03-18 RX ORDER — METHOCARBAMOL 500 MG/1
500 TABLET, FILM COATED ORAL 4 TIMES DAILY
Status: DISCONTINUED | OUTPATIENT
Start: 2025-03-18 | End: 2025-03-19 | Stop reason: HOSPADM

## 2025-03-18 RX ORDER — MORPHINE SULFATE 2 MG/ML
2 INJECTION, SOLUTION INTRAMUSCULAR; INTRAVENOUS EVERY 6 HOURS PRN
Status: DISCONTINUED | OUTPATIENT
Start: 2025-03-18 | End: 2025-03-19

## 2025-03-18 RX ORDER — SODIUM CHLORIDE 0.9 % (FLUSH) 0.9 %
10 SYRINGE (ML) INJECTION EVERY 12 HOURS PRN
Status: DISCONTINUED | OUTPATIENT
Start: 2025-03-18 | End: 2025-03-19

## 2025-03-18 RX ORDER — PANTOPRAZOLE SODIUM 40 MG/10ML
40 INJECTION, POWDER, LYOPHILIZED, FOR SOLUTION INTRAVENOUS 2 TIMES DAILY
Status: DISCONTINUED | OUTPATIENT
Start: 2025-03-18 | End: 2025-03-19 | Stop reason: HOSPADM

## 2025-03-18 RX ORDER — POLYETHYLENE GLYCOL 3350 17 G/17G
17 POWDER, FOR SOLUTION ORAL DAILY PRN
Status: DISCONTINUED | OUTPATIENT
Start: 2025-03-18 | End: 2025-03-19

## 2025-03-18 RX ORDER — HYDROCODONE BITARTRATE AND ACETAMINOPHEN 500; 5 MG/1; MG/1
TABLET ORAL
Status: DISCONTINUED | OUTPATIENT
Start: 2025-03-18 | End: 2025-03-19

## 2025-03-18 RX ORDER — FERROUS SULFATE 300 MG/5ML
300 LIQUID (ML) ORAL DAILY
Status: DISCONTINUED | OUTPATIENT
Start: 2025-03-19 | End: 2025-03-18

## 2025-03-18 RX ORDER — OXYCODONE HYDROCHLORIDE 5 MG/1
5 TABLET ORAL EVERY 6 HOURS PRN
Refills: 0 | Status: DISCONTINUED | OUTPATIENT
Start: 2025-03-18 | End: 2025-03-19 | Stop reason: HOSPADM

## 2025-03-18 RX ORDER — OXCARBAZEPINE 300 MG/1
300 TABLET, FILM COATED ORAL 2 TIMES DAILY
Status: DISCONTINUED | OUTPATIENT
Start: 2025-03-18 | End: 2025-03-18

## 2025-03-18 RX ORDER — CYANOCOBALAMIN (VITAMIN B-12) 250 MCG
500 TABLET ORAL DAILY
Status: DISCONTINUED | OUTPATIENT
Start: 2025-03-19 | End: 2025-03-18

## 2025-03-18 RX ORDER — ACETAMINOPHEN 325 MG/1
650 TABLET ORAL EVERY 4 HOURS PRN
Status: DISCONTINUED | OUTPATIENT
Start: 2025-03-18 | End: 2025-03-19

## 2025-03-18 RX ORDER — PRAZOSIN HYDROCHLORIDE 5 MG/1
5 CAPSULE ORAL NIGHTLY
Status: DISCONTINUED | OUTPATIENT
Start: 2025-03-18 | End: 2025-03-18

## 2025-03-18 RX ORDER — IBUPROFEN 200 MG
24 TABLET ORAL
Status: DISCONTINUED | OUTPATIENT
Start: 2025-03-18 | End: 2025-03-19

## 2025-03-18 RX ORDER — IBUPROFEN 200 MG
16 TABLET ORAL
Status: DISCONTINUED | OUTPATIENT
Start: 2025-03-18 | End: 2025-03-19

## 2025-03-18 RX ORDER — GLUCAGON 1 MG
1 KIT INJECTION
Status: DISCONTINUED | OUTPATIENT
Start: 2025-03-18 | End: 2025-03-19

## 2025-03-18 RX ADMIN — METHOCARBAMOL 500 MG: 500 TABLET ORAL at 08:03

## 2025-03-18 RX ADMIN — PANTOPRAZOLE SODIUM 80 MG: 40 INJECTION, POWDER, LYOPHILIZED, FOR SOLUTION INTRAVENOUS at 11:03

## 2025-03-18 RX ADMIN — MORPHINE SULFATE 4 MG: 4 INJECTION INTRAVENOUS at 02:03

## 2025-03-18 RX ADMIN — PANTOPRAZOLE SODIUM 40 MG: 40 INJECTION, POWDER, LYOPHILIZED, FOR SOLUTION INTRAVENOUS at 09:03

## 2025-03-18 RX ADMIN — OXYCODONE 10 MG: 5 TABLET ORAL at 08:03

## 2025-03-18 RX ADMIN — IOHEXOL 100 ML: 350 INJECTION, SOLUTION INTRAVENOUS at 12:03

## 2025-03-18 NOTE — ED NOTES
Received report from Kamille GUNTER and assumed care of patient at this time. Pt presents to ED w/ c/o numbness and sob.   Patient is AAOx4 with a calm affect and aware of environment. Airway is open and patent, respirations are spontaneous, normal effort and rate noted. Pt denies chest pain at this time. Skin warm and dry. Movement to all extremities noted. Bed placed in low position, side rails up x 2, call light is within reach of patient. Explanation of care provided to patient, pt placed on BP, pulse-ox and cardiac monitoring. Patient offers no complaints at this time. Awaiting further MD orders and bed assignment, POC continues.

## 2025-03-18 NOTE — ASSESSMENT & PLAN NOTE
Patient's blood pressure range in the last 24 hours was: BP  Min: 120/56  Max: 144/85.The patient's inpatient anti-hypertensive regimen is listed below:  Current Antihypertensives       Plan  - BP is controlled, no changes needed to their regimen  - on no home antihypertensives, continue to monitor

## 2025-03-18 NOTE — H&P
[facility] - Emergency Dept  [facility] Medicine  History & Physical    Patient Name: Janay Mathis  MRN: [id number]  Patient Class: OP- Observation  Admission Date: 3/18/2025  Attending Physician: Valorie Lanier MD   Primary Care Provider: No primary care provider on file.         Patient information was obtained from patient, past medical records, and ER records.     Subjective:     Principal Problem:GI bleed    Chief Complaint:   Chief Complaint   Patient presents with    Numbness     Left arm and face numbness, onset 10 days ago    Shortness of Breath     Feels a stabbing pain in central chest         HPI: Janay Mathis is a 44 y.o.F with hx of HTN, GERD, thrombocytopenia who presents to [facility] ED with reports of BRBPR, shortness of breath with associated chest pain. Patient reports this problem has been occurring for at least a year. Reports that she notices blood in her stool with every bowel movement, last one being yesterday. Of note, patient with recent colonoscopy/endoscopy evaluation in Feb of this year for her symptoms  which was relatively normal aside from internal and external hemorrhoids. Denies fever, chills, chest pain, palpitations, SOB, cough, hematemesis, hematuria, vaginal bleeding, dysuria, headaches, or any other symptoms at this time.     In ED: AF, initially tachycardic (now improved), otherwise VSS. CBC with leukocytosis 7.47 (then elevated to 22.61 - most likely reactionary) and Hgb 3.8 (improved to 4.7 on repeat). CMP unremarkable. BNP 37, HS trop <3. Vaginal screen with few WBC, many bacteria. CXR with no significant intrathoracic abnormality. CTA without evidence of active GI bleeding. S/p 3u pRBCs ordered in ED, morphine, and 80mg PPI inj.     History reviewed. No pertinent past medical history.    Past Surgical History:   Procedure Laterality Date    COLONOSCOPY N/A 2/3/2025    Procedure: COLONOSCOPY;  Surgeon: Erasto Reyes MD;  Location: [facility] ([address] [place] [address]);  Service:  Endoscopy;  Laterality: N/A;    ESOPHAGOGASTRODUODENOSCOPY N/A 2/3/2025    Procedure: EGD (ESOPHAGOGASTRODUODENOSCOPY);  Surgeon: Erasto Reyes MD;  Location: [address]);  Service: Endoscopy;  Laterality: N/A;       Review of patient's allergies indicates:   Allergen Reactions    Tramadol Itching and Swelling       No current facility-administered medications on file prior to encounter.     Current Outpatient Medications on File Prior to Encounter   Medication Sig    cyanocobalamin 500 MCG tablet Take 500 mcg by mouth once daily.    ferrous sulfate 300 mg (60 mg iron)/5 mL syrup Take 300 mg by mouth once daily.    OLANZapine (ZYPREXA) 2.5 MG tablet Take 1 tablet by mouth every evening.    OXcarbazepine (TRILEPTAL) 300 MG Tab Take 1 tablet by mouth 2 (two) times daily.    pantoprazole (PROTONIX) 40 MG tablet Take 1 tablet (40 mg total) by mouth once daily.    prazosin (MINIPRESS) 5 MG capsule Take 5 mg by mouth every evening.    zolpidem (AMBIEN) 5 MG Tab Take 5 mg by mouth nightly as needed (INSOMMNIA).     Family History    None       Tobacco Use    Smoking status: Never    Smokeless tobacco: Never   Substance and Sexual Activity    Alcohol use: Not Currently    Drug use: Yes     Types: Marijuana     Comment: occasionally    Sexual activity: Not on file     Review of Systems   Constitutional:  Negative for activity change, chills and fever.   HENT:  Negative for trouble swallowing.    Eyes:  Negative for photophobia and visual disturbance.   Respiratory:  Positive for shortness of breath. Negative for cough and chest tightness.    Cardiovascular:  Positive for chest pain. Negative for palpitations and leg swelling.   Gastrointestinal:  Positive for abdominal pain, blood in stool and nausea. Negative for constipation, diarrhea and vomiting.   Genitourinary:  Negative for dysuria, frequency and hematuria.   Musculoskeletal:  Negative for back pain, gait problem and neck pain.   Skin:  Negative for rash and  wound.   Neurological:  Negative for dizziness, syncope, speech difficulty and light-headedness.   Psychiatric/Behavioral:  Negative for agitation and confusion. The patient is not nervous/anxious.      Objective:     Vital Signs (Most Recent):  Temp: 99.2 °F (37.3 °C) (03/18/25 1631)  Pulse: 83 (03/18/25 1631)  Resp: (!) 22 (03/18/25 1631)  BP: 128/67 (03/18/25 1631)  SpO2: 100 % (03/18/25 1631) Vital Signs (24h Range):  Temp:  [98.3 °F (36.8 °C)-99.2 °F (37.3 °C)] 99.2 °F (37.3 °C)  Pulse:  [] 83  Resp:  [19-22] 22  SpO2:  [97 %-100 %] 100 %  BP: (120-144)/(56-85) 128/67     Weight: 81.6 kg (180 lb)  Body mass index is 34.01 kg/m².     Physical Exam  Vitals and nursing note reviewed.   Constitutional:       General: She is not in acute distress.     Appearance: She is well-developed.   HENT:      Head: Normocephalic and atraumatic.      Mouth/Throat:      Mouth: Mucous membranes are pale.      Pharynx: No oropharyngeal exudate.   Eyes:      Extraocular Movements: Extraocular movements intact.      Conjunctiva/sclera: Conjunctivae normal.   Cardiovascular:      Rate and Rhythm: Normal rate and regular rhythm.      Heart sounds: Normal heart sounds.   Pulmonary:      Effort: Pulmonary effort is normal. No respiratory distress.      Breath sounds: Normal breath sounds. No wheezing.   Abdominal:      General: Bowel sounds are normal. There is no distension.      Palpations: Abdomen is soft.      Tenderness: There is abdominal tenderness (slight suprapubic discomfort).   Musculoskeletal:         General: No tenderness. Normal range of motion.      Cervical back: Normal range of motion and neck supple.   Lymphadenopathy:      Cervical: No cervical adenopathy.   Skin:     General: Skin is warm and dry.      Findings: No rash.   Neurological:      Mental Status: She is alert and oriented to person, place, and time.      Cranial Nerves: No cranial nerve deficit.      Sensory: No sensory deficit.      Coordination:  "Coordination normal.   Psychiatric:         Behavior: Behavior normal.         Thought Content: Thought content normal.         Judgment: Judgment normal.                Significant Labs: All pertinent labs within the past 24 hours have been reviewed.    Bilirubin:   Recent Labs   Lab 03/18/25  1040   BILITOT 0.7     Blood Culture: No results for input(s): "LABBLOO" in the last 48 hours.  BMP:   Recent Labs   Lab 03/18/25  1040   GLU 95      K 4.1      CO2 22*   BUN 10   CREATININE 0.8   CALCIUM 8.8   MG 2.1     CBC:   Recent Labs   Lab 03/18/25  1040 03/18/25  1548   WBC 7.47 22.61*   HGB 3.8* 4.7*   HCT 15.3* 17.5*   * 695*     CMP:   Recent Labs   Lab 03/18/25  1040      K 4.1      CO2 22*   GLU 95   BUN 10   CREATININE 0.8   CALCIUM 8.8   PROT 7.1   ALBUMIN 3.8   BILITOT 0.7   ALKPHOS 91   AST 18   ALT 17   ANIONGAP 8     Cardiac Markers:   Recent Labs   Lab 03/18/25  1050   BNP 37     Magnesium:   Recent Labs   Lab 03/18/25  1040   MG 2.1     Pathology Results  (Last 10 years)      None          POCT Glucose:   Recent Labs   Lab 03/18/25  0910   POCTGLUCOSE 127*       Troponin:   Recent Labs   Lab 03/18/25  1040   TROPONINIHS <3     Significant Imaging: I have reviewed all pertinent imaging results/findings within the past 24 hours.  Imaging Results              CTA Acute GI Bleed, Abdomen and Pelvis (Final result)  Result time 03/18/25 12:33:51      Final result by Jose Odonnell MD (03/18/25 12:33:51)                   Impression:      No CT angiographic evidence of active gastrointestinal bleeding.      Electronically signed by: Jose Odonnell  Date:    03/18/2025  Time:    12:33               Narrative:    EXAMINATION:  CTA ACUTE GI BLEED, ABDOMEN AND PELVIS    CLINICAL HISTORY:  Gastrointestinal bleeding    TECHNIQUE:  Contrast enhanced CTA of theabdomen and pelvis was obtained. Images were reformatted and reviewed in coronal and sagittal planes. Volume-rendered 3D reconstructed " images were acquired by post processing for a better visualization of the vascular anomalies, with concurrent supervision and archived for permanent records.    Images were acquired during arterial, and venous phases after 100 cc of non ionic intravenous contrast administration.  Precontrast images were also acquired.    COMPARISON:  01/30/2025    FINDINGS:  Abdomen:    - Lung bases: No infiltrates and no nodules.    - Liver: No focal mass.    - Gallbladder: No calcified gallstones.    - Bile Ducts: No evidence of intra or extra hepatic biliary ductal dilation.    - Spleen: Negative.    - Kidneys: No mass or hydronephrosis.    - Adrenals: Unremarkable.    - Pancreas: No mass or peripancreatic fat stranding.    - Retroperitoneum:  No significant adenopathy.    - Vascular: No abdominal aortic aneurysm.    - Abdominal wall:  There is diastasis of the rectus sheath in the periumbilical region without hernia.    Pelvis:    Uterus noted.  No calcified stones within the bladder.    Bowel/Mesentery:    There is no contrast extravasation within the bowel.  No bowel wall thickening.  No obstruction.    Bones:  No acute osseous abnormality and no suspicious lytic or blastic lesion.                                       X-Ray Chest AP Portable (Final result)  Result time 03/18/25 10:53:01      Final result by Gee Pedraza MD (03/18/25 10:53:01)                   Impression:      No significant intrathoracic abnormality, allowing for technical factors mentioned above.  Allowing for an even poorer inspiratory depth level on the current examination, there has been no significant detrimental interval change in the appearance of the chest since 01/30/2025.      Electronically signed by: Gee Pedraza MD  Date:    03/18/2025  Time:    10:53               Narrative:    EXAMINATION:  XR CHEST AP PORTABLE    CLINICAL HISTORY:  chest pain;    TECHNIQUE:  One view    COMPARISON:  Comparison is made to 01/30/2025 at 15:39.  Clinical  information of shortness of breath is obtained from the electronic medical record.    FINDINGS:  Allowing for magnification of the cardiomediastinal silhouette related both to projection and to a poor inspiratory depth level, the true cardiac size is felt to be no larger than upper limit of normal.  Cardiomediastinal silhouette demonstrates no detrimental change since the examination referenced above.  Pulmonary vascularity is normal, and there are no findings indicating current cardiac decompensation.  Lung zones are clear, and are free of significant airspace consolidation or volume loss.  No pleural fluid.  No pneumothorax.                                      Assessment/Plan:     * GI bleed  Patient's hemorrhage is due to gastrointestinal bleed, this bleeding is not associated with a medication or a coagulopathy. Patients most recent Hgb, Hct, platelets, and INR are listed below.  Recent Labs     03/18/25  1040 03/18/25  1548   HGB 3.8* 4.7*   HCT 15.3* 17.5*   * 695*     Plan  - patient started on GIB pathway   - Will trend hemoglobin/hematocrit Every 8 hours  - Will monitor and correct any coagulation defects  - check CMP, Lactate, Lipase, PT/PTT  - received 80mg IV Protonix in the ED, continue 40mg IV BID  - obtain 2 large bore IVs, start IVF with NS @100 cc/hr  - consult GI for acute GIB, appreciate recommendations.  - consult CRS for acute GIB, potentially rectal bleeding, appreciate recommendations  - transfuse blood products as necessary for hgb <7   - use IV anti-emetics as needed (if appropriate with Qtc).   - Will transfuse if Hgb is <7g/dl (<8g/dl in cases of active ACS) or if patient has rapid bleeding leading to hemodynamic instability    Gastroesophageal reflux disease without esophagitis  - PPI inj for GI bleed      Essential hypertension  Patient's blood pressure range in the last 24 hours was: BP  Min: 120/56  Max: 144/85.The patient's inpatient anti-hypertensive regimen is listed  below:  Current Antihypertensives       Plan  - BP is controlled, no changes needed to their regimen  - on no home antihypertensives, continue to monitor    B12 deficiency  - patient denies taking home B12 supplement  - B12 levels pending      Bipolar affective disorder, currently active  - history noted   - patient reports no longer taking any medications for this      Acute blood loss anemia  Anemia is likely due to acute blood loss which was from GI bleed and Iron deficiency. Most recent hemoglobin and hematocrit are listed below.  Recent Labs     03/18/25  1040 03/18/25  1548   HGB 3.8* 4.7*   HCT 15.3* 17.5*     Plan  - Monitor serial CBC: Every 8 hours  - Transfuse PRBC if patient becomes hemodynamically unstable, symptomatic or H/H drops below 7/21.  - Patient has received 3 units of PRBCs on 3/18  - Patient's anemia is currently improving  - anemia labs pending      VTE Risk Mitigation (From admission, onward)           Ordered     IP VTE HIGH RISK PATIENT  Once         03/18/25 1655     Place sequential compression device  Until discontinued         03/18/25 1655     Reason for No Pharmacological VTE Prophylaxis  Once        Question:  Reasons:  Answer:  Active Bleeding    03/18/25 1655                         On 03/18/2025, patient should be placed in hospital observation services under my care in collaboration with Dr. Valorie Lanier.           Kinjla Vega PA-C  Department of Hospital Medicine  [facility] - Emergency Dept

## 2025-03-18 NOTE — SUBJECTIVE & OBJECTIVE
History reviewed. No pertinent past medical history.    Past Surgical History:   Procedure Laterality Date    COLONOSCOPY N/A 2/3/2025    Procedure: COLONOSCOPY;  Surgeon: Erasto Reyes MD;  Location: [facility] ([address] [place] [address]);  Service: Endoscopy;  Laterality: N/A;    ESOPHAGOGASTRODUODENOSCOPY N/A 2/3/2025    Procedure: EGD (ESOPHAGOGASTRODUODENOSCOPY);  Surgeon: Erasto Reyes MD;  Location: [address]);  Service: Endoscopy;  Laterality: N/A;       Review of patient's allergies indicates:   Allergen Reactions    Tramadol Itching and Swelling       No current facility-administered medications on file prior to encounter.     Current Outpatient Medications on File Prior to Encounter   Medication Sig    cyanocobalamin 500 MCG tablet Take 500 mcg by mouth once daily.    ferrous sulfate 300 mg (60 mg iron)/5 mL syrup Take 300 mg by mouth once daily.    OLANZapine (ZYPREXA) 2.5 MG tablet Take 1 tablet by mouth every evening.    OXcarbazepine (TRILEPTAL) 300 MG Tab Take 1 tablet by mouth 2 (two) times daily.    pantoprazole (PROTONIX) 40 MG tablet Take 1 tablet (40 mg total) by mouth once daily.    prazosin (MINIPRESS) 5 MG capsule Take 5 mg by mouth every evening.    zolpidem (AMBIEN) 5 MG Tab Take 5 mg by mouth nightly as needed (INSOMMNIA).     Family History    None       Tobacco Use    Smoking status: Never    Smokeless tobacco: Never   Substance and Sexual Activity    Alcohol use: Not Currently    Drug use: Yes     Types: Marijuana     Comment: occasionally    Sexual activity: Not on file     Review of Systems   Constitutional:  Negative for activity change, chills and fever.   HENT:  Negative for trouble swallowing.    Eyes:  Negative for photophobia and visual disturbance.   Respiratory:  Positive for shortness of breath. Negative for cough and chest tightness.    Cardiovascular:  Positive for chest pain. Negative for palpitations and leg swelling.   Gastrointestinal:  Positive for abdominal pain, blood in  stool and nausea. Negative for constipation, diarrhea and vomiting.   Genitourinary:  Negative for dysuria, frequency and hematuria.   Musculoskeletal:  Negative for back pain, gait problem and neck pain.   Skin:  Negative for rash and wound.   Neurological:  Negative for dizziness, syncope, speech difficulty and light-headedness.   Psychiatric/Behavioral:  Negative for agitation and confusion. The patient is not nervous/anxious.      Objective:     Vital Signs (Most Recent):  Temp: 99.2 °F (37.3 °C) (03/18/25 1631)  Pulse: 83 (03/18/25 1631)  Resp: (!) 22 (03/18/25 1631)  BP: 128/67 (03/18/25 1631)  SpO2: 100 % (03/18/25 1631) Vital Signs (24h Range):  Temp:  [98.3 °F (36.8 °C)-99.2 °F (37.3 °C)] 99.2 °F (37.3 °C)  Pulse:  [] 83  Resp:  [19-22] 22  SpO2:  [97 %-100 %] 100 %  BP: (120-144)/(56-85) 128/67     Weight: 81.6 kg (180 lb)  Body mass index is 34.01 kg/m².     Physical Exam  Vitals and nursing note reviewed.   Constitutional:       General: She is not in acute distress.     Appearance: She is well-developed.   HENT:      Head: Normocephalic and atraumatic.      Mouth/Throat:      Mouth: Mucous membranes are pale.      Pharynx: No oropharyngeal exudate.   Eyes:      Extraocular Movements: Extraocular movements intact.      Conjunctiva/sclera: Conjunctivae normal.   Cardiovascular:      Rate and Rhythm: Normal rate and regular rhythm.      Heart sounds: Normal heart sounds.   Pulmonary:      Effort: Pulmonary effort is normal. No respiratory distress.      Breath sounds: Normal breath sounds. No wheezing.   Abdominal:      General: Bowel sounds are normal. There is no distension.      Palpations: Abdomen is soft.      Tenderness: There is abdominal tenderness (slight suprapubic discomfort).   Musculoskeletal:         General: No tenderness. Normal range of motion.      Cervical back: Normal range of motion and neck supple.   Lymphadenopathy:      Cervical: No cervical adenopathy.   Skin:     General:  "Skin is warm and dry.      Findings: No rash.   Neurological:      Mental Status: She is alert and oriented to person, place, and time.      Cranial Nerves: No cranial nerve deficit.      Sensory: No sensory deficit.      Coordination: Coordination normal.   Psychiatric:         Behavior: Behavior normal.         Thought Content: Thought content normal.         Judgment: Judgment normal.                Significant Labs: All pertinent labs within the past 24 hours have been reviewed.    Bilirubin:   Recent Labs   Lab 03/18/25  1040   BILITOT 0.7     Blood Culture: No results for input(s): "LABBLOO" in the last 48 hours.  BMP:   Recent Labs   Lab 03/18/25  1040   GLU 95      K 4.1      CO2 22*   BUN 10   CREATININE 0.8   CALCIUM 8.8   MG 2.1     CBC:   Recent Labs   Lab 03/18/25  1040 03/18/25  1548   WBC 7.47 22.61*   HGB 3.8* 4.7*   HCT 15.3* 17.5*   * 695*     CMP:   Recent Labs   Lab 03/18/25  1040      K 4.1      CO2 22*   GLU 95   BUN 10   CREATININE 0.8   CALCIUM 8.8   PROT 7.1   ALBUMIN 3.8   BILITOT 0.7   ALKPHOS 91   AST 18   ALT 17   ANIONGAP 8     Cardiac Markers:   Recent Labs   Lab 03/18/25  1050   BNP 37     Magnesium:   Recent Labs   Lab 03/18/25  1040   MG 2.1     Pathology Results  (Last 10 years)      None          POCT Glucose:   Recent Labs   Lab 03/18/25  0910   POCTGLUCOSE 127*       Troponin:   Recent Labs   Lab 03/18/25  1040   TROPONINIHS <3     Significant Imaging: I have reviewed all pertinent imaging results/findings within the past 24 hours.  Imaging Results              CTA Acute GI Bleed, Abdomen and Pelvis (Final result)  Result time 03/18/25 12:33:51      Final result by Jose dOonnell MD (03/18/25 12:33:51)                   Impression:      No CT angiographic evidence of active gastrointestinal bleeding.      Electronically signed by: Jose Odonnell  Date:    03/18/2025  Time:    12:33               Narrative:    EXAMINATION:  CTA ACUTE GI BLEED, ABDOMEN " AND PELVIS    CLINICAL HISTORY:  Gastrointestinal bleeding    TECHNIQUE:  Contrast enhanced CTA of theabdomen and pelvis was obtained. Images were reformatted and reviewed in coronal and sagittal planes. Volume-rendered 3D reconstructed images were acquired by post processing for a better visualization of the vascular anomalies, with concurrent supervision and archived for permanent records.    Images were acquired during arterial, and venous phases after 100 cc of non ionic intravenous contrast administration.  Precontrast images were also acquired.    COMPARISON:  01/30/2025    FINDINGS:  Abdomen:    - Lung bases: No infiltrates and no nodules.    - Liver: No focal mass.    - Gallbladder: No calcified gallstones.    - Bile Ducts: No evidence of intra or extra hepatic biliary ductal dilation.    - Spleen: Negative.    - Kidneys: No mass or hydronephrosis.    - Adrenals: Unremarkable.    - Pancreas: No mass or peripancreatic fat stranding.    - Retroperitoneum:  No significant adenopathy.    - Vascular: No abdominal aortic aneurysm.    - Abdominal wall:  There is diastasis of the rectus sheath in the periumbilical region without hernia.    Pelvis:    Uterus noted.  No calcified stones within the bladder.    Bowel/Mesentery:    There is no contrast extravasation within the bowel.  No bowel wall thickening.  No obstruction.    Bones:  No acute osseous abnormality and no suspicious lytic or blastic lesion.                                       X-Ray Chest AP Portable (Final result)  Result time 03/18/25 10:53:01      Final result by Gee Pedraza MD (03/18/25 10:53:01)                   Impression:      No significant intrathoracic abnormality, allowing for technical factors mentioned above.  Allowing for an even poorer inspiratory depth level on the current examination, there has been no significant detrimental interval change in the appearance of the chest since 01/30/2025.      Electronically signed by: Gee Pedraza  MD  Date:    03/18/2025  Time:    10:53               Narrative:    EXAMINATION:  XR CHEST AP PORTABLE    CLINICAL HISTORY:  chest pain;    TECHNIQUE:  One view    COMPARISON:  Comparison is made to 01/30/2025 at 15:39.  Clinical information of shortness of breath is obtained from the electronic medical record.    FINDINGS:  Allowing for magnification of the cardiomediastinal silhouette related both to projection and to a poor inspiratory depth level, the true cardiac size is felt to be no larger than upper limit of normal.  Cardiomediastinal silhouette demonstrates no detrimental change since the examination referenced above.  Pulmonary vascularity is normal, and there are no findings indicating current cardiac decompensation.  Lung zones are clear, and are free of significant airspace consolidation or volume loss.  No pleural fluid.  No pneumothorax.

## 2025-03-18 NOTE — ED TRIAGE NOTES
Janay Mathis, a 44 y.o. female presents to the ED w/ complaint of shortness of breath. Pt arrives to the ED via private vehicle with complaints of shortness of breath. Pt states that she has been short of breath for over a month. Pt states she was recently seen at another facility for the same symptoms and states that  they just treated the symptoms for a short period of time. Pt states she is short of breath constantly. Pt also endorses nausea with emesis.Pt states that she also gets very dizzy easily with just standing or sitting. Pt also complains of vaginal bleeding for months at random times along with left arm and facial numbness.       Triage note:  Chief Complaint   Patient presents with    Numbness     Left arm and face numbness, onset 10 days ago    Shortness of Breath     Feels a stabbing pain in central chest      Review of patient's allergies indicates:   Allergen Reactions    Tramadol Itching and Swelling     No past medical history on file.

## 2025-03-18 NOTE — ASSESSMENT & PLAN NOTE
Anemia is likely due to acute blood loss which was from GI bleed and Iron deficiency. Most recent hemoglobin and hematocrit are listed below.  Recent Labs     03/18/25  1040 03/18/25  1548   HGB 3.8* 4.7*   HCT 15.3* 17.5*     Plan  - Monitor serial CBC: Every 8 hours  - Transfuse PRBC if patient becomes hemodynamically unstable, symptomatic or H/H drops below 7/21.  - Patient has received 3 units of PRBCs on 3/18  - Patient's anemia is currently improving  - anemia labs pending

## 2025-03-18 NOTE — ASSESSMENT & PLAN NOTE
Patient's hemorrhage is due to gastrointestinal bleed, this bleeding is not associated with a medication or a coagulopathy. Patients most recent Hgb, Hct, platelets, and INR are listed below.  Recent Labs     03/18/25  1040 03/18/25  1548   HGB 3.8* 4.7*   HCT 15.3* 17.5*   * 695*     Plan  - patient started on GIB pathway   - Will trend hemoglobin/hematocrit Every 8 hours  - Will monitor and correct any coagulation defects  - check CMP, Lactate, Lipase, PT/PTT  - received 80mg IV Protonix in the ED, continue 40mg IV BID  - obtain 2 large bore IVs, start IVF with NS @100 cc/hr  - consult GI for acute GIB, appreciate recommendations.  - consult CRS for acute GIB, potentially rectal bleeding, appreciate recommendations  - transfuse blood products as necessary for hgb <7   - use IV anti-emetics as needed (if appropriate with Qtc).   - Will transfuse if Hgb is <7g/dl (<8g/dl in cases of active ACS) or if patient has rapid bleeding leading to hemodynamic instability

## 2025-03-18 NOTE — ED PROVIDER NOTES
Encounter Date: 3/18/2025       History     Chief Complaint   Patient presents with    Numbness     Left arm and face numbness, onset 10 days ago    Shortness of Breath     Feels a stabbing pain in central chest      HPI    Patient is a 44-year-old female with a history of hypertension, GERD, thrombocytopenia, acute blood loss anemia presenting today with concern of BRBPR, shortness of breath, chest pain.    Patient endorses that for the last few years, she has experienced bleeding that she is confident is coming from her rectum.  Has had a recent colonoscopy and endoscopy, relatively normal aside from internal and external hemorrhoids.  She is confident that she isn't having any vaginal bleeding.    Chest pain and shortness of breath attributed to her suspected anemia, reports that she has had it when she has bled a lot in the past, but this is worse now.  Also reporting some intermittent left hand/arm and leg numbness with activity.    Review of patient's allergies indicates:   Allergen Reactions    Tramadol Itching and Swelling     History reviewed. No pertinent past medical history.  Past Surgical History:   Procedure Laterality Date    COLONOSCOPY N/A 2/3/2025    Procedure: COLONOSCOPY;  Surgeon: Erasto Reyes MD;  Location: [address]);  Service: Endoscopy;  Laterality: N/A;    ESOPHAGOGASTRODUODENOSCOPY N/A 2/3/2025    Procedure: EGD (ESOPHAGOGASTRODUODENOSCOPY);  Surgeon: Erasto Reyes MD;  Location: [address]);  Service: Endoscopy;  Laterality: N/A;     No family history on file.  Social History[1]    Physical Exam     Initial Vitals [03/18/25 0911]   BP Pulse Resp Temp SpO2   (!) 144/85 (!) 122 20 98.3 °F (36.8 °C) 97 %      MAP       --         Physical Exam    Constitutional: She appears well-developed and well-nourished. She is not diaphoretic. No distress.   HENT:   Head: Normocephalic and atraumatic.   Cardiovascular:  Regular rhythm.           Tachycardia   Pulmonary/Chest:  Breath sounds normal. No respiratory distress. She has no wheezes. She has no rales.   Abdominal: She exhibits no distension. There is no abdominal tenderness.   Genitourinary:    Genitourinary Comments: BRANDON and pelvic exams performed with chaperone  No stool obtained on BRANDON, external hemorrhoids appreciated with no BRBPR  Pelvic exam revealing discharge, no blood     Musculoskeletal:         General: No edema. Normal range of motion.     Neurological: She is alert. No sensory deficit.   Skin: Skin is warm and dry. There is pallor.   Psychiatric: She has a normal mood and affect. Thought content normal.         ED Course   Procedures  Labs Reviewed   VAGINAL SCREEN - Abnormal       Result Value    Trichomonas None      Clue Cells None      Budding Yeast None      Fungal Hyphae None      WBC - Vaginal Screen Few (*)     Bacteria - Vaginal Screen Many (*)     Wet Prep Source Vagina      Narrative:     Release to patient->Immediate   COMPREHENSIVE METABOLIC PANEL - Abnormal    Sodium 140      Potassium 4.1      Chloride 110      CO2 22 (*)     Glucose 95      BUN 10      Creatinine 0.8      Calcium 8.8      Total Protein 7.1      Albumin 3.8      Total Bilirubin 0.7      Alkaline Phosphatase 91      AST 18      ALT 17      eGFR >60.0      Anion Gap 8     CBC W/ AUTO DIFFERENTIAL - Abnormal    WBC 7.47      RBC 2.31 (*)     Hemoglobin 3.8 (*)     Hematocrit 15.3 (*)     MCV 66 (*)     MCH 16.5 (*)     MCHC 24.8 (*)     RDW 25.1 (*)     Platelets 699 (*)     MPV 10.2      Immature Granulocytes 0.3      Gran # (ANC) 5.6      Immature Grans (Abs) 0.02      Lymph # 1.2      Mono # 0.6      Eos # 0.0      Baso # 0.02      nRBC 1 (*)     Gran % 75.0 (*)     Lymph % 15.7 (*)     Mono % 8.2      Eosinophil % 0.5      Basophil % 0.3      Platelet Estimate Increased (*)     Aniso Moderate      Poik Moderate      Poly Moderate      Hypo Moderate      Ovalocytes Occasional      Tear Drop Cells Moderate      Schistocytes Present       Basophilic Stippling Occasional      Dohle Bodies Present      Toxic Granulation Present      Large/Giant Platelets Present      Fragmented Cells Marked      Differential Method Automated      Narrative:     Kamille Pollock RN acknowledged and accepted results on test(s) H&H via   secure chat.  by MAB3 03/18/2025 11:12   CBC W/ AUTO DIFFERENTIAL - Abnormal    WBC 22.61 (*)     RBC 2.54 (*)     Hemoglobin 4.7 (*)     Hematocrit 17.5 (*)     MCV 69 (*)     MCH 18.5 (*)     MCHC 26.9 (*)     RDW 28.2 (*)     Platelets 695 (*)     MPV 10.3      Narrative:     After first unit of blood  HGB and HCT critical result(s) called and verbal readback obtained   from Jose Huerta RN by ANGEO 03/18/2025 16:43   POCT GLUCOSE - Abnormal    POCT Glucose 127 (*)    MAGNESIUM    Magnesium 2.1     TROPONIN I HIGH SENSITIVITY    Troponin I High Sensitivity <3     B-TYPE NATRIURETIC PEPTIDE    BNP 37     CBC W/ AUTO DIFFERENTIAL   FERRITIN   FOLATE   VITAMIN B12   IRON AND TIBC   TYPE & SCREEN    Group & Rh O POS      Indirect Kavon NEG      Specimen Outdate 03/21/2025 23:59     PREPARE RBC SOFT    UNIT NUMBER [id number]      Product Code U9405B87      DISPENSE STATUS ISSUED      CODING SYSTEM LGWS518      Unit Blood Type Code 5100      Unit Blood Type O POS      Unit Expiration [id number]      CROSSMATCH INTERPRETATION Compatible      UNIT NUMBER [id number]      Product Code [id number]      DISPENSE STATUS CROSSMATCHED      CODING SYSTEM OTNT882      Unit Blood Type Code 5100      Unit Blood Type O POS      Unit Expiration [id number]      CROSSMATCH INTERPRETATION Compatible      UNIT NUMBER [id number]      Product Code E3693A46      DISPENSE STATUS ISSUED      CODING SYSTEM VAEC167      Unit Blood Type Code 5100      Unit Blood Type O POS      Unit Expiration [id number]      CROSSMATCH INTERPRETATION Compatible       EKG Readings: (Independently Interpreted)   Rhythm: Sinus Tachycardia. Heart Rate: 100. Ectopy: No  Ectopy. Conduction: Normal. T Waves Flipped: III, AVR, V1 and V2. Axis: Left Axis Deviation. Clinical Impression: Sinus Tachycardia     ECG Results              EKG 12-lead (Final result)        Collection Time Result Time QRS Duration OHS QTC Calculation    03/18/25 09:15:40 03/18/25 10:02:44 94 472                     Final result by Interface, Lab In [facility] (03/18/25 10:02:49)                   Narrative:    Test Reason : R06.02,    Vent. Rate : 100 BPM     Atrial Rate : 100 BPM     P-R Int : 136 ms          QRS Dur :  94 ms      QT Int : 366 ms       P-R-T Axes :  48 -24  24 degrees    QTcB Int : 472 ms    Normal sinus rhythm  Possible Left atrial enlargement  Low septal forces ; Abnormal R wave progression in the precordial leads  Borderline Abnormal ECG  When compared with ECG of 30-Jan-2025 16:36,  No significant change was found  Confirmed by Monty Ponce ([id number]) on 3/18/2025 10:02:40 AM    Referred By:            Confirmed By: Monty Ponce                                  Imaging Results              CTA Acute GI Bleed, Abdomen and Pelvis (Final result)  Result time 03/18/25 12:33:51      Final result by Jose Odonnell MD (03/18/25 12:33:51)                   Impression:      No CT angiographic evidence of active gastrointestinal bleeding.      Electronically signed by: Jose Odonnell  Date:    03/18/2025  Time:    12:33               Narrative:    EXAMINATION:  CTA ACUTE GI BLEED, ABDOMEN AND PELVIS    CLINICAL HISTORY:  Gastrointestinal bleeding    TECHNIQUE:  Contrast enhanced CTA of theabdomen and pelvis was obtained. Images were reformatted and reviewed in coronal and sagittal planes. Volume-rendered 3D reconstructed images were acquired by post processing for a better visualization of the vascular anomalies, with concurrent supervision and archived for permanent records.    Images were acquired during arterial, and venous phases after 100 cc of non ionic intravenous contrast administration.  Precontrast  images were also acquired.    COMPARISON:  01/30/2025    FINDINGS:  Abdomen:    - Lung bases: No infiltrates and no nodules.    - Liver: No focal mass.    - Gallbladder: No calcified gallstones.    - Bile Ducts: No evidence of intra or extra hepatic biliary ductal dilation.    - Spleen: Negative.    - Kidneys: No mass or hydronephrosis.    - Adrenals: Unremarkable.    - Pancreas: No mass or peripancreatic fat stranding.    - Retroperitoneum:  No significant adenopathy.    - Vascular: No abdominal aortic aneurysm.    - Abdominal wall:  There is diastasis of the rectus sheath in the periumbilical region without hernia.    Pelvis:    Uterus noted.  No calcified stones within the bladder.    Bowel/Mesentery:    There is no contrast extravasation within the bowel.  No bowel wall thickening.  No obstruction.    Bones:  No acute osseous abnormality and no suspicious lytic or blastic lesion.                                       X-Ray Chest AP Portable (Final result)  Result time 03/18/25 10:53:01      Final result by Gee Pedraza MD (03/18/25 10:53:01)                   Impression:      No significant intrathoracic abnormality, allowing for technical factors mentioned above.  Allowing for an even poorer inspiratory depth level on the current examination, there has been no significant detrimental interval change in the appearance of the chest since 01/30/2025.      Electronically signed by: Gee Pedraza MD  Date:    03/18/2025  Time:    10:53               Narrative:    EXAMINATION:  XR CHEST AP PORTABLE    CLINICAL HISTORY:  chest pain;    TECHNIQUE:  One view    COMPARISON:  Comparison is made to 01/30/2025 at 15:39.  Clinical information of shortness of breath is obtained from the electronic medical record.    FINDINGS:  Allowing for magnification of the cardiomediastinal silhouette related both to projection and to a poor inspiratory depth level, the true cardiac size is felt to be no larger than upper limit of normal.   Cardiomediastinal silhouette demonstrates no detrimental change since the examination referenced above.  Pulmonary vascularity is normal, and there are no findings indicating current cardiac decompensation.  Lung zones are clear, and are free of significant airspace consolidation or volume loss.  No pleural fluid.  No pneumothorax.                                       Medications   0.9%  NaCl infusion (for blood administration) (has no administration in time range)   sodium chloride 0.9% flush 10 mL (has no administration in time range)   melatonin tablet 6 mg (has no administration in time range)   ondansetron disintegrating tablet 8 mg (has no administration in time range)   prochlorperazine injection Soln 5 mg (has no administration in time range)   polyethylene glycol packet 17 g (has no administration in time range)   acetaminophen tablet 650 mg (has no administration in time range)   acetaminophen tablet 1,000 mg (has no administration in time range)   naloxone 0.4 mg/mL injection 0.02 mg (has no administration in time range)   glucose chewable tablet 16 g (has no administration in time range)   glucose chewable tablet 24 g (has no administration in time range)   dextrose 50% injection 12.5 g (has no administration in time range)   dextrose 50% injection 25 g (has no administration in time range)   glucagon (human recombinant) injection 1 mg (has no administration in time range)   pantoprazole injection 40 mg (has no administration in time range)   cyanocobalamin tablet 500 mcg (has no administration in time range)   ferrous sulfate 300 mg (60 mg iron)/5 mL syrup 300 mg (has no administration in time range)   OLANZapine tablet 2.5 mg (has no administration in time range)   OXcarbazepine tablet 300 mg (has no administration in time range)   prazosin capsule 5 mg (has no administration in time range)   pantoprazole injection 80 mg (80 mg Intravenous Given 3/18/25 1144)   iohexoL (OMNIPAQUE 350) injection 100 mL  (100 mLs Intravenous Given 3/18/25 1210)   morphine injection 4 mg (4 mg Intravenous Given 3/18/25 1442)     Medical Decision Making  Amount and/or Complexity of Data Reviewed  Labs: ordered.  Radiology: ordered.    Risk  Prescription drug management.    Patient is a 44-year-old female with a history of hypertension, GERD, thrombocytopenia, acute blood loss anemia presenting today with concern of BRBPR, shortness of breath, chest pain.    On exam, patient is tachycardic but overall well-appearing.  Endorsing a significant amount of blood loss via rectum, showing a picture of a large blood clot that she had passed.    BNP and troponin sent for chest pain and shortness of breath eval, however this is likely secondary to suspected anemia.    Patient's exam was also relatively benign, aside from tachycardia.  Pelvic exam not revealing blood.  No stool obtained on BRANDON, so negative Hemoccult.     CMP relatively unremarkable, however CBC revealing hemoglobin of 3.8.  Patient was ordered 3 units for transfusion.    CTA abdomen and pelvis negative.    After 1 unit of PRBCs, hemoglobin improved to 4.7.  She was admitted to Hospital Medicine to the step-down unit.                                  Clinical Impression:  Final diagnoses:  [R06.02] Shortness of breath  [R00.0] Tachycardia  [D64.9] Anemia          ED Disposition Condition    Observation                     [1]   Social History  Tobacco Use    Smoking status: Never    Smokeless tobacco: Never   Substance Use Topics    Alcohol use: Not Currently    Drug use: Yes     Types: Marijuana     Comment: occasionally        Lyubov Acosta DO  Resident  03/18/25 6962

## 2025-03-18 NOTE — HPI
Janay Mathis is a 44 y.o.F with hx of HTN, GERD, thrombocytopenia who presents to [facility] ED with reports of BRBPR, shortness of breath with associated chest pain. Patient reports this problem has been occurring for at least a year. Reports that she notices blood in her stool with every bowel movement, last one being yesterday. Of note, patient with recent colonoscopy/endoscopy evaluation in Feb of this year for her symptoms  which was relatively normal aside from internal and external hemorrhoids. Denies fever, chills, chest pain, palpitations, SOB, cough, hematemesis, hematuria, vaginal bleeding, dysuria, headaches, or any other symptoms at this time.     In ED: AF, initially tachycardic (now improved), otherwise VSS. CBC with leukocytosis 7.47 (then elevated to 22.61 - most likely reactionary) and Hgb 3.8 (improved to 4.7 on repeat). CMP unremarkable. BNP 37, HS trop <3. Vaginal screen with few WBC, many bacteria. CXR with no significant intrathoracic abnormality. CTA without evidence of active GI bleeding. S/p 3u pRBCs ordered in ED, morphine, and 80mg PPI inj.

## 2025-03-19 ENCOUNTER — ANESTHESIA (OUTPATIENT)
Dept: SURGERY | Facility: HOSPITAL | Age: 45
End: 2025-03-19
Payer: MEDICAID

## 2025-03-19 ENCOUNTER — ANESTHESIA EVENT (OUTPATIENT)
Dept: SURGERY | Facility: HOSPITAL | Age: 45
End: 2025-03-19
Payer: MEDICAID

## 2025-03-19 VITALS
SYSTOLIC BLOOD PRESSURE: 127 MMHG | OXYGEN SATURATION: 100 % | DIASTOLIC BLOOD PRESSURE: 69 MMHG | HEIGHT: 61 IN | RESPIRATION RATE: 22 BRPM | WEIGHT: 180 LBS | BODY MASS INDEX: 33.99 KG/M2 | HEART RATE: 70 BPM | TEMPERATURE: 97 F

## 2025-03-19 LAB
ALBUMIN SERPL BCP-MCNC: 3.6 G/DL (ref 3.5–5.2)
ALP SERPL-CCNC: 85 U/L (ref 40–150)
ALT SERPL W/O P-5'-P-CCNC: 15 U/L (ref 10–44)
ANION GAP SERPL CALC-SCNC: 11 MMOL/L (ref 8–16)
AST SERPL-CCNC: 21 U/L (ref 10–40)
B-HCG UR QL: NEGATIVE
BASOPHILS # BLD AUTO: 0.03 K/UL (ref 0–0.2)
BASOPHILS NFR BLD: 0.2 % (ref 0–1.9)
BILIRUB SERPL-MCNC: 4.3 MG/DL (ref 0.1–1)
BUN SERPL-MCNC: 9 MG/DL (ref 6–20)
CALCIUM SERPL-MCNC: 8.7 MG/DL (ref 8.7–10.5)
CHLORIDE SERPL-SCNC: 107 MMOL/L (ref 95–110)
CO2 SERPL-SCNC: 19 MMOL/L (ref 23–29)
CREAT SERPL-MCNC: 0.7 MG/DL (ref 0.5–1.4)
CTP QC/QA: YES
DIFFERENTIAL METHOD BLD: ABNORMAL
EOSINOPHIL # BLD AUTO: 0 K/UL (ref 0–0.5)
EOSINOPHIL NFR BLD: 0.3 % (ref 0–8)
ERYTHROCYTE [DISTWIDTH] IN BLOOD BY AUTOMATED COUNT: 24.5 % (ref 11.5–14.5)
EST. GFR  (NO RACE VARIABLE): >60 ML/MIN/1.73 M^2
GLUCOSE SERPL-MCNC: 88 MG/DL (ref 70–110)
HCT VFR BLD AUTO: 25.4 % (ref 37–48.5)
HGB BLD-MCNC: 7.2 G/DL (ref 12–16)
IMM GRANULOCYTES # BLD AUTO: 0.04 K/UL (ref 0–0.04)
IMM GRANULOCYTES NFR BLD AUTO: 0.3 % (ref 0–0.5)
LYMPHOCYTES # BLD AUTO: 0.9 K/UL (ref 1–4.8)
LYMPHOCYTES NFR BLD: 7.3 % (ref 18–48)
MAGNESIUM SERPL-MCNC: 1.9 MG/DL (ref 1.6–2.6)
MCH RBC QN AUTO: 21.6 PG (ref 27–31)
MCHC RBC AUTO-ENTMCNC: 28.3 G/DL (ref 32–36)
MCV RBC AUTO: 76 FL (ref 82–98)
MONOCYTES # BLD AUTO: 0.7 K/UL (ref 0.3–1)
MONOCYTES NFR BLD: 5.2 % (ref 4–15)
NEUTROPHILS # BLD AUTO: 11 K/UL (ref 1.8–7.7)
NEUTROPHILS NFR BLD: 86.7 % (ref 38–73)
NRBC BLD-RTO: 1 /100 WBC
OHS QRS DURATION: 98 MS
OHS QTC CALCULATION: 443 MS
PATH REV BLD -IMP: NORMAL
PHOSPHATE SERPL-MCNC: 3.3 MG/DL (ref 2.7–4.5)
PLATELET # BLD AUTO: 571 K/UL (ref 150–450)
PMV BLD AUTO: 10 FL (ref 9.2–12.9)
POTASSIUM SERPL-SCNC: 4.2 MMOL/L (ref 3.5–5.1)
PROT SERPL-MCNC: 6.7 G/DL (ref 6–8.4)
RBC # BLD AUTO: 3.33 M/UL (ref 4–5.4)
SODIUM SERPL-SCNC: 137 MMOL/L (ref 136–145)
WBC # BLD AUTO: 12.73 K/UL (ref 3.9–12.7)

## 2025-03-19 PROCEDURE — 63600175 PHARM REV CODE 636 W HCPCS: Mod: JZ,TB | Performed by: COLON & RECTAL SURGERY

## 2025-03-19 PROCEDURE — 88304 TISSUE EXAM BY PATHOLOGIST: CPT | Mod: 59 | Performed by: PATHOLOGY

## 2025-03-19 PROCEDURE — 25000003 PHARM REV CODE 250

## 2025-03-19 PROCEDURE — 85025 COMPLETE CBC W/AUTO DIFF WBC: CPT

## 2025-03-19 PROCEDURE — 36000707: Performed by: COLON & RECTAL SURGERY

## 2025-03-19 PROCEDURE — 25000003 PHARM REV CODE 250: Performed by: COLON & RECTAL SURGERY

## 2025-03-19 PROCEDURE — 25000003 PHARM REV CODE 250: Performed by: STUDENT IN AN ORGANIZED HEALTH CARE EDUCATION/TRAINING PROGRAM

## 2025-03-19 PROCEDURE — 83735 ASSAY OF MAGNESIUM: CPT

## 2025-03-19 PROCEDURE — 80053 COMPREHEN METABOLIC PANEL: CPT

## 2025-03-19 PROCEDURE — 37000009 HC ANESTHESIA EA ADD 15 MINS: Performed by: COLON & RECTAL SURGERY

## 2025-03-19 PROCEDURE — 63600175 PHARM REV CODE 636 W HCPCS

## 2025-03-19 PROCEDURE — G0378 HOSPITAL OBSERVATION PER HR: HCPCS

## 2025-03-19 PROCEDURE — 25000242 PHARM REV CODE 250 ALT 637 W/ HCPCS: Performed by: STUDENT IN AN ORGANIZED HEALTH CARE EDUCATION/TRAINING PROGRAM

## 2025-03-19 PROCEDURE — 84100 ASSAY OF PHOSPHORUS: CPT

## 2025-03-19 PROCEDURE — 81025 URINE PREGNANCY TEST: CPT | Performed by: STUDENT IN AN ORGANIZED HEALTH CARE EDUCATION/TRAINING PROGRAM

## 2025-03-19 PROCEDURE — 63600175 PHARM REV CODE 636 W HCPCS: Mod: JZ,TB

## 2025-03-19 PROCEDURE — 71000016 HC POSTOP RECOV ADDL HR: Performed by: COLON & RECTAL SURGERY

## 2025-03-19 PROCEDURE — 36000706: Performed by: COLON & RECTAL SURGERY

## 2025-03-19 PROCEDURE — 63600175 PHARM REV CODE 636 W HCPCS: Performed by: STUDENT IN AN ORGANIZED HEALTH CARE EDUCATION/TRAINING PROGRAM

## 2025-03-19 PROCEDURE — 46260 REMOVE IN/EX HEM GROUPS 2+: CPT | Mod: ,,, | Performed by: COLON & RECTAL SURGERY

## 2025-03-19 PROCEDURE — 71000015 HC POSTOP RECOV 1ST HR: Performed by: COLON & RECTAL SURGERY

## 2025-03-19 PROCEDURE — 71000044 HC DOSC ROUTINE RECOVERY FIRST HOUR: Performed by: COLON & RECTAL SURGERY

## 2025-03-19 PROCEDURE — 37000008 HC ANESTHESIA 1ST 15 MINUTES: Performed by: COLON & RECTAL SURGERY

## 2025-03-19 RX ORDER — LIDOCAINE HYDROCHLORIDE 20 MG/ML
INJECTION INTRAVENOUS
Status: DISCONTINUED | OUTPATIENT
Start: 2025-03-19 | End: 2025-03-19

## 2025-03-19 RX ORDER — SUCCINYLCHOLINE CHLORIDE 20 MG/ML
INJECTION INTRAMUSCULAR; INTRAVENOUS
Status: DISCONTINUED | OUTPATIENT
Start: 2025-03-19 | End: 2025-03-19

## 2025-03-19 RX ORDER — HYDROMORPHONE HYDROCHLORIDE 1 MG/ML
0.2 INJECTION, SOLUTION INTRAMUSCULAR; INTRAVENOUS; SUBCUTANEOUS EVERY 5 MIN PRN
Status: DISCONTINUED | OUTPATIENT
Start: 2025-03-19 | End: 2025-03-19

## 2025-03-19 RX ORDER — HALOPERIDOL LACTATE 5 MG/ML
INJECTION, SOLUTION INTRAMUSCULAR
Status: DISCONTINUED | OUTPATIENT
Start: 2025-03-19 | End: 2025-03-19

## 2025-03-19 RX ORDER — IBUPROFEN 600 MG/1
600 TABLET ORAL EVERY 8 HOURS
Qty: 21 TABLET | Refills: 0 | Status: SHIPPED | OUTPATIENT
Start: 2025-03-19 | End: 2025-03-26

## 2025-03-19 RX ORDER — POLYETHYLENE GLYCOL 3350 17 G/17G
17 POWDER, FOR SOLUTION ORAL DAILY
Qty: 510 G | Refills: 0 | Status: SHIPPED | OUTPATIENT
Start: 2025-03-19 | End: 2025-03-19

## 2025-03-19 RX ORDER — OXYCODONE HYDROCHLORIDE 5 MG/1
5 TABLET ORAL EVERY 4 HOURS PRN
Qty: 25 TABLET | Refills: 0 | Status: SHIPPED | OUTPATIENT
Start: 2025-03-19 | End: 2025-03-22 | Stop reason: SDUPTHER

## 2025-03-19 RX ORDER — SODIUM CHLORIDE 0.9 % (FLUSH) 0.9 %
10 SYRINGE (ML) INJECTION
Status: DISCONTINUED | OUTPATIENT
Start: 2025-03-19 | End: 2025-03-19

## 2025-03-19 RX ORDER — POLYETHYLENE GLYCOL 3350 17 G/17G
17 POWDER, FOR SOLUTION ORAL 3 TIMES DAILY
Start: 2025-03-19 | End: 2025-04-18

## 2025-03-19 RX ORDER — GLUCAGON 1 MG
1 KIT INJECTION
Status: DISCONTINUED | OUTPATIENT
Start: 2025-03-19 | End: 2025-03-19

## 2025-03-19 RX ORDER — KETAMINE HCL IN 0.9 % NACL 50 MG/5 ML
SYRINGE (ML) INTRAVENOUS
Status: DISCONTINUED | OUTPATIENT
Start: 2025-03-19 | End: 2025-03-19

## 2025-03-19 RX ORDER — OXYCODONE HYDROCHLORIDE 5 MG/1
5 TABLET ORAL EVERY 4 HOURS PRN
Status: DISCONTINUED | OUTPATIENT
Start: 2025-03-19 | End: 2025-03-19

## 2025-03-19 RX ORDER — PHENYLEPHRINE HCL IN 0.9% NACL 1 MG/10 ML
SYRINGE (ML) INTRAVENOUS
Status: DISCONTINUED | OUTPATIENT
Start: 2025-03-19 | End: 2025-03-19

## 2025-03-19 RX ORDER — ALBUTEROL SULFATE 90 UG/1
INHALANT RESPIRATORY (INHALATION)
Status: DISCONTINUED | OUTPATIENT
Start: 2025-03-19 | End: 2025-03-19

## 2025-03-19 RX ORDER — FENTANYL CITRATE 50 UG/ML
INJECTION, SOLUTION INTRAMUSCULAR; INTRAVENOUS
Status: DISCONTINUED | OUTPATIENT
Start: 2025-03-19 | End: 2025-03-19

## 2025-03-19 RX ORDER — ACETAMINOPHEN 10 MG/ML
INJECTION, SOLUTION INTRAVENOUS
Status: DISCONTINUED | OUTPATIENT
Start: 2025-03-19 | End: 2025-03-19

## 2025-03-19 RX ORDER — PROPOFOL 10 MG/ML
VIAL (ML) INTRAVENOUS
Status: DISCONTINUED | OUTPATIENT
Start: 2025-03-19 | End: 2025-03-19

## 2025-03-19 RX ORDER — ACETAMINOPHEN 500 MG
1000 TABLET ORAL EVERY 8 HOURS
Qty: 42 TABLET | Refills: 0 | Status: SHIPPED | OUTPATIENT
Start: 2025-03-19 | End: 2025-03-26

## 2025-03-19 RX ORDER — MIDAZOLAM HYDROCHLORIDE 1 MG/ML
INJECTION INTRAMUSCULAR; INTRAVENOUS
Status: DISCONTINUED | OUTPATIENT
Start: 2025-03-19 | End: 2025-03-19

## 2025-03-19 RX ORDER — BUPIVACAINE 13.3 MG/ML
20 INJECTION, SUSPENSION, LIPOSOMAL INFILTRATION ONCE
Status: COMPLETED | OUTPATIENT
Start: 2025-03-19 | End: 2025-03-19

## 2025-03-19 RX ORDER — ONDANSETRON HYDROCHLORIDE 2 MG/ML
4 INJECTION, SOLUTION INTRAVENOUS EVERY 12 HOURS PRN
Status: DISCONTINUED | OUTPATIENT
Start: 2025-03-19 | End: 2025-03-19

## 2025-03-19 RX ORDER — BUPIVACAINE HYDROCHLORIDE AND EPINEPHRINE 2.5; 5 MG/ML; UG/ML
INJECTION, SOLUTION EPIDURAL; INFILTRATION; INTRACAUDAL; PERINEURAL
Status: DISCONTINUED | OUTPATIENT
Start: 2025-03-19 | End: 2025-03-19 | Stop reason: HOSPADM

## 2025-03-19 RX ORDER — DEXAMETHASONE SODIUM PHOSPHATE 10 MG/ML
INJECTION INTRAMUSCULAR; INTRAVENOUS
Status: DISCONTINUED | OUTPATIENT
Start: 2025-03-19 | End: 2025-03-19

## 2025-03-19 RX ORDER — HALOPERIDOL LACTATE 5 MG/ML
0.5 INJECTION, SOLUTION INTRAMUSCULAR EVERY 10 MIN PRN
Status: DISCONTINUED | OUTPATIENT
Start: 2025-03-19 | End: 2025-03-19

## 2025-03-19 RX ORDER — ONDANSETRON HYDROCHLORIDE 2 MG/ML
INJECTION, SOLUTION INTRAVENOUS
Status: DISCONTINUED | OUTPATIENT
Start: 2025-03-19 | End: 2025-03-19

## 2025-03-19 RX ADMIN — DEXAMETHASONE SODIUM PHOSPHATE 4 MG: 10 INJECTION INTRAMUSCULAR; INTRAVENOUS at 02:03

## 2025-03-19 RX ADMIN — FENTANYL CITRATE 25 MCG: 50 INJECTION, SOLUTION INTRAMUSCULAR; INTRAVENOUS at 03:03

## 2025-03-19 RX ADMIN — Medication 100 MCG: at 03:03

## 2025-03-19 RX ADMIN — METHOCARBAMOL 500 MG: 500 TABLET ORAL at 09:03

## 2025-03-19 RX ADMIN — LIDOCAINE HYDROCHLORIDE 100 MG: 20 INJECTION INTRAVENOUS at 02:03

## 2025-03-19 RX ADMIN — OXYCODONE 5 MG: 5 TABLET ORAL at 04:03

## 2025-03-19 RX ADMIN — HALOPERIDOL LACTATE 1 MG: 5 INJECTION, SOLUTION INTRAMUSCULAR at 02:03

## 2025-03-19 RX ADMIN — ACETAMINOPHEN 1000 MG: 10 INJECTION INTRAVENOUS at 02:03

## 2025-03-19 RX ADMIN — FENTANYL CITRATE 50 MCG: 50 INJECTION, SOLUTION INTRAMUSCULAR; INTRAVENOUS at 02:03

## 2025-03-19 RX ADMIN — ALBUTEROL SULFATE 4 PUFF: 108 INHALANT RESPIRATORY (INHALATION) at 03:03

## 2025-03-19 RX ADMIN — SUCCINYLCHOLINE 80 MG: 20 INJECTION, SOLUTION INTRAMUSCULAR; INTRAVENOUS at 02:03

## 2025-03-19 RX ADMIN — Medication 20 MG: at 02:03

## 2025-03-19 RX ADMIN — PANTOPRAZOLE SODIUM 40 MG: 40 INJECTION, POWDER, LYOPHILIZED, FOR SOLUTION INTRAVENOUS at 09:03

## 2025-03-19 RX ADMIN — HYDROMORPHONE HYDROCHLORIDE 0.2 MG: 1 INJECTION, SOLUTION INTRAMUSCULAR; INTRAVENOUS; SUBCUTANEOUS at 04:03

## 2025-03-19 RX ADMIN — MIDAZOLAM HYDROCHLORIDE 2 MG: 2 INJECTION, SOLUTION INTRAMUSCULAR; INTRAVENOUS at 02:03

## 2025-03-19 RX ADMIN — BUPIVACAINE 266 MG: 13.3 INJECTION, SUSPENSION, LIPOSOMAL INFILTRATION at 03:03

## 2025-03-19 RX ADMIN — SODIUM CHLORIDE: 0.9 INJECTION, SOLUTION INTRAVENOUS at 02:03

## 2025-03-19 RX ADMIN — ONDANSETRON 4 MG: 2 INJECTION INTRAMUSCULAR; INTRAVENOUS at 02:03

## 2025-03-19 RX ADMIN — METHOCARBAMOL 500 MG: 500 TABLET ORAL at 04:03

## 2025-03-19 RX ADMIN — PROPOFOL 150 MG: 10 INJECTION, EMULSION INTRAVENOUS at 02:03

## 2025-03-19 NOTE — BRIEF OP NOTE
[facility] - Surgery ([facility])  Brief Operative Note    Surgery Date: 3/19/2025     Surgeons and Role:     * Josie Salazar MD - Primary     * Angelica Emmanuel MD - Resident - Assisting        Pre-op Diagnosis:  Hemorrhoids, unspecified hemorrhoid type [K64.9]    Post-op Diagnosis:  Post-Op Diagnosis Codes:     * Hemorrhoids, unspecified hemorrhoid type [K64.9]    Procedure(s) (LRB):  HEMORRHOIDECTOMY (N/A)    Anesthesia: General    Operative Findings: two column hemorrhoidectomy with anterior right column hemorrhoidopexy    Estimated Blood Loss: * No values recorded between 3/19/2025  2:22 PM and 3/19/2025  3:51 PM *         Specimens:   Specimen (24h ago, onward)       Start     Ordered    03/19/25 1548  Specimen to Pathology, Surgery Gynecology and Obstetrics  Once        Comments: Pre-op Diagnosis: Hemorrhoids, unspecified hemorrhoid type [K64.9]Procedure(s):HEMORRHOIDECTOMY Number of specimens: 2Name of specimens: Right Posterior Hemorrhoidectomy- permLeft Lateral Hemorrhoid-perm     References:    Click here for ordering Quick Tip   Question Answer Comment   Procedure Type: Gynecology and Obstetrics    Specimen Class: Routine/Screening    Release to patient Immediate        03/19/25 1549                      Discharge Note    OUTCOME: Patient tolerated treatment/procedure well without complication and is now ready for discharge.    DISPOSITION: Home or Self Care    FINAL DIAGNOSIS:  GI bleed    FOLLOWUP: In clinic    DISCHARGE INSTRUCTIONS:    Anal Surgery Post Op Instructions:    You had a hemorrhoidectomy (two column) and a hemorhoidopexy performed today.     Wound care:  Expect some drainage over the next few weeks as the wound heals.  Please wear a pad to help with drainage.   You may have stitches in place that will absorb.  They may break down early and cause an open wound - this is very common and it will continue to heal fine.  There may be foam packing in your anus - once this comes out with a  bowel movement or passing gas, flush down the toilet or throw away and do not replace.  Showering is okay starting tomorrow. You can take warm water soaks in your own bathtub (aka sitz baths) to relax tight pelvic muscles and cleanse the area gently after bowel movements. Using a gently bidet, squirt bottle, or shower head can help cleanse the area.  Do not put creams or ointments over the area as it heals.    Medications:  You should take acetaminophen (tylenol) and ibuprofen (motrin) around the clock for the first 24hrs for continued baseline pain control then wean off, but it is safe to continue taking then around the clock for over a week if needed.  For example, take 1000mg tylenol every 6 hours and 600mg motrin every six hours. You can leap-frog these mediations to have continued pain control: tylenol at 9am, motrin at 12pm, tylenol at 3pm, motrin at 6pm, ect. A narcotic pain medication has been prescribed for severe pain.  Please only take this pain medication as needed since this can cause painful constipation and painful bowel movements.    You can resume your home medications.    It is important to take miralax 1 capful THREE TIMES A DAY to reduce discomfort with bowel movements and to prevent constipation associated with narcotic pain medications.  You can add docusate (colace) 200mg twice a day as well. Prune juice or milk of magnesium are also good medications to try if you continue to have constipation. Senna (senakot) can also be added, 2 tabs once a day.    Restrictions:  No swimming or hot tubs until healed.  You have no activity restrictions. Return to work/school when pain is controlled without narcotics and you feel well to do your job/pay attention in class.  No dietary restrictions. Keep well hydrated to have soft bowel movements.    Follow up:  Return to clinic in about 4 weeks for follow up and wound check. Call [phone] for worsening pain, inability to urinate, or fever > 101.  Call or  schedule follow up in about 7 days via [employer] if you are not otherwise contacted or see an appointment in the portal.

## 2025-03-19 NOTE — ANESTHESIA PREPROCEDURE EVALUATION
[facility]  Anesthesia Pre-Operative Evaluation     Patient Name: Janay Mathis  YOB: 1980  MRN: [id number]  [facility]: [id number]       Admit Date: 3/18/2025   Admit Team: [facility]  Hospital Day: 2  Date of Procedure: 3/19/2025  Anesthesia: General Procedure: Procedure(s) (LRB):  HEMORRHOIDECTOMY (N/A)  Pre-Operative Diagnosis: Hemorrhoids, unspecified hemorrhoid type [K64.9]  Proceduralist:Surgeons and Role:     * Josie Salazar MD - Primary  Code Status: Full Code   Advanced Directive: <no information>  Isolation Precautions: No active isolations  Capacity: Full capacity       SUBJECTIVE:     Pre-operative evaluation for Procedure(s) (LRB):  HEMORRHOIDECTOMY (N/A)  03/19/2025  Hospital LOS: 0 days  ICU LOS: Patient does not have an ICU stay during this admission.    Janay Mathis is a 44 y.o. female w/ a significant PMHx of bipolar, anemia, HTN, GERD, PTSD, GIB presented with blood in stool, chest pain, and shortness of breath. Has required transfusions 3 units transfused  due severe anemia from blood in stool. CRS planning for hemorrhoidectomy.          Patient now presents for the above procedure(s).    NPO Status:     TTE:  No results found for this or any previous visit.  LDA:        Peripheral IV - Single Lumen 03/18/25 1051 16 G Anterior;Left Upper Arm (Active)   Site Assessment Clean;Dry;Intact;No redness;No swelling 03/18/25 1051   Extremity Assessment Distal to IV No abnormal discoloration;No redness;No swelling;No warmth 03/18/25 1051   Line Status Blood return noted;Flushed;Saline locked 03/18/25 1051   Dressing Status Clean;Dry;Intact 03/18/25 1051   Number of days: 1     Vent/Oxygen: None documented         Drips: None documented.    Previous Airway: None documented  Allergies:  Review of patient's allergies indicates:   Allergen Reactions    Tramadol Itching and Swelling     Medications:     Current Outpatient Medications   Medication Instructions     acetaminophen (TYLENOL) 1,000 mg, Oral, Every 8 hours    cyanocobalamin 500 mcg, Oral, Daily    ferrous sulfate 300 mg, Daily    ibuprofen (ADVIL,MOTRIN) 600 mg, Oral, Every 8 hours    OLANZapine (ZYPREXA) 2.5 MG tablet 1 tablet, Oral, Nightly    OXcarbazepine (TRILEPTAL) 300 MG Tab 1 tablet, Oral, 2 times daily    oxyCODONE (ROXICODONE) 5 mg, Oral, Every 4 hours PRN    pantoprazole (PROTONIX) 40 mg, Oral, Daily    polyethylene glycol (GLYCOLAX) 17 g, Oral, Daily    prazosin (MINIPRESS) 5 mg, Oral, Nightly    zolpidem (AMBIEN) 5 mg, Oral, Nightly PRN     Inpatient Medications:   methocarbamoL  500 mg Oral QID    pantoprazole  40 mg Intravenous BID     History:     Active Hospital Problems    Diagnosis  POA    *GI bleed [K92.2]  Yes    B12 deficiency [E53.8]  Yes    Gastroesophageal reflux disease without esophagitis [K21.9]  Yes    Acute blood loss anemia [D62]  Yes    Bipolar affective disorder, currently active [F31.9]  Yes    Essential hypertension [I10]  Yes      Resolved Hospital Problems   No resolved problems to display.     Medical History:  History reviewed. No pertinent past medical history.  Surgical History:    has a past surgical history that includes Esophagogastroduodenoscopy (N/A, 2/3/2025) and Colonoscopy (N/A, 2/3/2025).   Social History:    has no history on file for sexual activity.  reports that she has never smoked. She has never used smokeless tobacco. She reports that she does not currently use alcohol. She reports current drug use. Drug: Marijuana.    OBJECTIVE:   Vital Signs Range (Last 24H):  Temp:  [36.7 °C (98.1 °F)-37.3 °C (99.2 °F)]   Pulse:  [69-93]   Resp:  [16-22]   BP: (114-150)/(56-79)   SpO2:  [95 %-100 %]   Lab Results   Component Value Date    WBC 12.73 (H) 03/19/2025    WBC 12.73 (H) 03/19/2025    WBC 12.73 (H) 03/19/2025    HGB 7.2 (L) 03/19/2025    HGB 7.2 (L) 03/19/2025    HGB 7.2 (L) 03/19/2025    HCT 25.4 (L) 03/19/2025    HCT 25.4 (L) 03/19/2025    HCT 25.4 (L)  "03/19/2025     (H) 03/19/2025     (H) 03/19/2025     (H) 03/19/2025     03/19/2025    K 4.2 03/19/2025     03/19/2025    CREATININE 0.7 03/19/2025    BUN 9 03/19/2025    CO2 19 (L) 03/19/2025    GLU 88 03/19/2025    CALCIUM 8.7 03/19/2025    MG 1.9 03/19/2025    PHOS 3.3 03/19/2025    ALKPHOS 85 03/19/2025    ALT 15 03/19/2025    AST 21 03/19/2025    ALBUMIN 3.6 03/19/2025    INR 1.0 01/30/2025    APTT 21.1 01/30/2025    HGBA1C 4.2 (L) 08/05/2024     01/01/2025    BNP 37 03/18/2025     Recent Labs     03/18/25  1040 03/18/25  1548 03/18/25 2005 03/19/25  0330   WBC 7.47 22.61* 15.88* 12.73*  12.73*  12.73*   HGB 3.8* 4.7* 6.7* 7.2*  7.2*  7.2*   HCT 15.3* 17.5* 23.2* 25.4*  25.4*  25.4*   * 695* 617* 571*  571*  571*     --   --  137   K 4.1  --   --  4.2   CREATININE 0.8  --   --  0.7   GLU 95  --   --  88     No results for input(s): "PH", "PCO2", "PO2", "HCO3", "POCSATURATED", "BE" in the last 72 hours.   No LMP recorded.    Please see Results Review for additional labs & imaging.     EKG:   Results for orders placed or performed during the hospital encounter of 03/18/25   EKG 12-lead    Collection Time: 03/18/25  8:13 PM   Result Value Ref Range    QRS Duration 98 ms    [facility] QTC Calculation 443 ms    Narrative    Test Reason : R00.0,    Vent. Rate :  77 BPM     Atrial Rate :  77 BPM     P-R Int : 150 ms          QRS Dur :  98 ms      QT Int : 392 ms       P-R-T Axes :  57 -40  40 degrees    QTcB Int : 443 ms    Normal sinus rhythm  Left axis deviation  Low septal forces ; Abnormal R wave progression in the precordial leads -  Possible Anterior infarct ,age undetermined  Abnormal ECG  When compared with ECG of 18-Mar-2025 09:15,  No significant change was found  Confirmed by Monty Ponce ([id number]) on 3/19/2025 8:49:22 AM    Referred By: AAAREFERRAL SELF           Confirmed By: Monty Ponce     ECHO & Other Cardiac Studies:  See subjective, if " available.  ASSESSMENT/PLAN:         Pre-op Assessment    I have reviewed the Patient Summary Reports.     I have reviewed the Nursing Notes. I have reviewed the NPO Status.   I have reviewed the Medications.     Review of Systems  Anesthesia Hx:             Denies Family Hx of Anesthesia complications.    Denies Personal Hx of Anesthesia complications.                    Cardiovascular:     Hypertension                                    Hypertension         Hepatic/GI:     GERD         Gerd          Psych:  Psychiatric History                  Physical Exam  General: Well nourished, Cooperative and Alert    Airway:  Mallampati: II / II  Mouth Opening: Normal  TM Distance: Normal  Tongue: Normal  Neck ROM: Normal ROM    Dental:  Intact        Anesthesia Plan  Type of Anesthesia, risks & benefits discussed:    Anesthesia Type: Gen ETT  Intra-op Monitoring Plan: Standard ASA Monitors  Post Op Pain Control Plan: multimodal analgesia and IV/PO Opioids PRN  Induction:  IV  Airway Plan: Direct and Video  Informed Consent: Informed consent signed with the Patient and all parties understand the risks and agree with anesthesia plan.  All questions answered. Patient consented to blood products? Yes  ASA Score: 3  Day of Surgery Review of History & Physical: H&P Update referred to the surgeon/provider.    Ready For Surgery From Anesthesia Perspective.     .

## 2025-03-19 NOTE — TRANSFER OF CARE
"Anesthesia Transfer of Care Note    Patient: Janay Mathis    Procedure(s) Performed: Procedure(s) (LRB):  HEMORRHOIDECTOMY (N/A)    Patient location: PACU    Anesthesia Type: general    Transport from OR: Transported from OR on 6-10 L/min O2 by face mask with adequate spontaneous ventilation    Post pain: adequate analgesia    Post assessment: no apparent anesthetic complications    Post vital signs: stable    Level of consciousness: awake    Nausea/Vomiting: no nausea/vomiting    Complications: none    Transfer of care protocol was followed      Last vitals: Visit Vitals  /66 (BP Location: Right arm, Patient Position: Lying)   Pulse 66   Temp 36.6 °C (97.9 °F) (Temporal)   Resp (!) 30   Ht 5' 1" (1.549 m)   Wt 81.6 kg (180 lb)   LMP 03/04/2025 (Approximate)   SpO2 99%   Breastfeeding No   BMI 34.01 kg/m²     "

## 2025-03-19 NOTE — PROGRESS NOTES
[facility] - Emergency Dept  Colorectal Surgery  Progress Note    Patient Name: Janay Mathis  MRN: [id number]  Admission Date: 3/18/2025  Hospital Length of Stay: 0 days  Attending Physician: Valorie Lanier MD    Subjective:     Interval History: no furthering hematochezia episodes    Post-Op Info:  Procedure(s) (LRB):  HEMORRHOIDECTOMY (N/A)          Medications:  Continuous Infusions:  Scheduled Meds:   methocarbamoL  500 mg Oral QID    pantoprazole  40 mg Intravenous BID     PRN Meds:   methocarbamoL tablet 500 mg    pantoprazole injection 40 mg        Objective:     Vital Signs (Most Recent):  Temp: 98.3 °F (36.8 °C) (03/19/25 0738)  Pulse: 78 (03/19/25 0738)  Resp: 17 (03/19/25 0738)  BP: 129/76 (03/19/25 0738)  SpO2: 98 % (03/19/25 0738) Vital Signs (24h Range):  Temp:  [98.3 °F (36.8 °C)-99.2 °F (37.3 °C)] 98.3 °F (36.8 °C)  Pulse:  [69-93] 78  Resp:  [16-22] 17  SpO2:  [95 %-100 %] 98 %  BP: (114-150)/(56-79) 129/76     Intake/Output - Last 3 Shifts         03/17 0700  03/18 0659 03/18 0700 03/19 0659 03/19 0700  03/20 0659    Blood  939     Total Intake(mL/kg)  939 (11.5)     Net  +939                General: alert, awake  HEENT: EOMI, no scleral icterus, CN grossly intact  Cardiac: RRR, BP stable, warm well perfused  Pulm: non labored breathing on room air, no audible wheeze  Abdomen: soft, non distended, non TTP, obese  Extremities: moving all four extremities, no pedal edema, RUE with IV infiltration soft without skin changes  Skin: no obvious rashes, incisions clean and intact  Neuro: no obvious deficits  Psych: anxious and frustrated  Anorectal: deferred    Significant Labs:  CBC (Last 3 Results):   Recent Labs   Lab 03/18/25  1548 03/18/25 2005 03/19/25  0330   WBC 22.61* 15.88* 12.73*  12.73*  12.73*   RBC 2.54* 3.13* 3.33*  3.33*  3.33*   HGB 4.7* 6.7* 7.2*  7.2*  7.2*   HCT 17.5* 23.2* 25.4*  25.4*  25.4*   * 617* 571*  571*  571*   MCV 69* 74* 76*  76*  76*   MCH 18.5*  21.4* 21.6*  21.6*  21.6*   MCHC 26.9* 28.9* 28.3*  28.3*  28.3*       Significant Diagnostics:  None    Assessment/Plan:     * GI bleed  45yo F with chronic hematochezia. Likely related to internal hemorrhoids given negative colonoscopy recently and negative CTA.     - plan for hemorrhoidectomy this afternoon then DC home  - no abx needed  - keep NPO  - transfer to CRS post op  - consent signed, I have the form with me      Angelica Emmanuel MD  Colorectal Surgery  [facility] - Emergency Dept

## 2025-03-19 NOTE — HPI
43yo F with hematochezia. States she has had blood-coated BM since 2022 with every bowel movement up to 3+ xday and sometimes without. Painless, denies prolapse, straining, or prolonged sitting on the toilet. Waits until she is severely short of breath to get evaluated since she has presented to an ED often for hematochezia. Last cscope Feb 2025 unremarkable, UGI unremarkable. Hx of GERD and Hpylori, no family Hx of CRC. She presented to the ED today for SOB, chest pain, orthostasis. Hgb 3.8 and , now HD stable after 2u PRBC and ongoing blood transfusions. CRS and GI consulted.

## 2025-03-19 NOTE — ASSESSMENT & PLAN NOTE
Patient's hemorrhage is due to gastrointestinal bleed, this bleeding is not associated with a medication or a coagulopathy. Patients most recent Hgb, Hct, platelets, and INR are listed below.  Recent Labs     03/18/25  1548 03/18/25 2005 03/19/25  0330   HGB 4.7* 6.7* 7.2*  7.2*  7.2*   HCT 17.5* 23.2* 25.4*  25.4*  25.4*   * 617* 571*  571*  571*     Plan  - patient started on GIB pathway   - Will trend hemoglobin/hematocrit Every 8 hours  - Will monitor and correct any coagulation defects  - check CMP, Lactate, Lipase, PT/PTT  - received 80mg IV Protonix in the ED, continue 40mg IV BID  - obtain 2 large bore IVs, start IVF with NS @100 cc/hr  - consult CRS for acute GIB, potentially rectal bleeding, appreciate recommendations:  - plan for hemorrhoidectomy this afternoon then DC home  - no abx needed  - keep NPO  - transfer to CRS post op  - transfuse blood products as necessary for hgb <7   - use IV anti-emetics as needed (if appropriate with Qtc).   - Will transfuse if Hgb is <7g/dl (<8g/dl in cases of active ACS) or if patient has rapid bleeding leading to hemodynamic instability

## 2025-03-19 NOTE — ASSESSMENT & PLAN NOTE
45yo F with chronic hematochezia. Likely related to internal hemorrhoids given negative colonoscopy recently and negative CTA.     - plan for hemorrhoidectomy this afternoon then DC home  - no abx needed  - keep NPO  - transfer to CRS post op  - consent signed, I have the form with me

## 2025-03-19 NOTE — PHARMACY MED REC
"    Admission Medication History     The home medication history was taken by Meg Portillo.    You may go to "Admission" then "Reconcile Home Medications" tabs to review and/or act upon these items.     The home medication list has been updated by the Pharmacy department.   Please read ALL comments highlighted in yellow.   Please address this information as you see fit.    Feel free to contact us if you have any questions or require assistance.      The medications listed below were removed from the home medication list. Please reorder if appropriate:  Patient reports no longer taking the following medication(s):  Cyanocabalamin   Ferrous Sulfate   Ambien     Medications listed below were obtained from: Patient/family and Analytic software- [employer]  Current Outpatient Medications on File Prior to Encounter   Medication Sig    OLANZapine (ZYPREXA) 2.5 MG tablet Take 1 tablet by mouth every evening.      OXcarbazepine (TRILEPTAL) 300 MG Tab Take 1 tablet by mouth 2 (two) times daily.      pantoprazole (PROTONIX) 40 MG tablet Take 1 tablet (40 mg total) by mouth once daily.      prazosin (MINIPRESS) 5 MG capsule Take 5 mg by mouth every evening.         Meg Portillo  [id number]              .          "

## 2025-03-19 NOTE — ANESTHESIA PROCEDURE NOTES
Intubation    Date/Time: 3/19/2025 2:35 PM    Performed by: Fabrice Fish DO  Authorized by: Analilia Dutton MD    Intubation:     Induction:  Intravenous    Intubated:  Postinduction    Mask Ventilation:  Easy mask    Attempts:  1    Attempted By:  Resident anesthesiologist    Method of Intubation:  Direct    Blade:  Nielsen 2    Laryngeal View Grade: Grade I - full view of cords      Difficult Airway Encountered?: No      Complications:  None    Airway Device:  Oral endotracheal tube    Airway Device Size:  7.0    Style/Cuff Inflation:  Cuffed    Inflation Amount (mL):  8    Tube secured:  21    Secured at:  The lips    Placement Verified By:  Capnometry    Complicating Factors:  None    Findings Post-Intubation:  BS equal bilateral and atraumatic/condition of teeth unchanged

## 2025-03-19 NOTE — ED NOTES
Pt transported to surgery via wheel chair with surgery PCT  Pt condition stable on leaving the ED , pt belongings are with and pt notified family

## 2025-03-19 NOTE — SUBJECTIVE & OBJECTIVE
Review of patient's allergies indicates:   Allergen Reactions    Tramadol Itching and Swelling       History reviewed. No pertinent past medical history.  Past Surgical History:   Procedure Laterality Date    COLONOSCOPY N/A 2/3/2025    Procedure: COLONOSCOPY;  Surgeon: Erasto Reyes MD;  Location: [address]);  Service: Endoscopy;  Laterality: N/A;    ESOPHAGOGASTRODUODENOSCOPY N/A 2/3/2025    Procedure: EGD (ESOPHAGOGASTRODUODENOSCOPY);  Surgeon: Erasto Reyes MD;  Location: [address]);  Service: Endoscopy;  Laterality: N/A;     Family History    None       Tobacco Use    Smoking status: Never    Smokeless tobacco: Never   Substance and Sexual Activity    Alcohol use: Not Currently    Drug use: Yes     Types: Marijuana     Comment: occasionally    Sexual activity: Not on file     Review of Systems   Constitutional:  Negative for appetite change.   Gastrointestinal:  Positive for anal bleeding, blood in stool and nausea. Negative for abdominal distention, abdominal pain, constipation, diarrhea, rectal pain and vomiting.   Genitourinary:  Negative for hematuria and vaginal bleeding.     Objective:     Vital Signs (Most Recent):  Temp: 98.7 °F (37.1 °C) (03/18/25 1916)  Pulse: 93 (03/18/25 1916)  Resp: (!) 22 (03/18/25 1916)  BP: (!) 150/69 (03/18/25 1916)  SpO2: 100 % (03/18/25 1916) Vital Signs (24h Range):  Temp:  [98.3 °F (36.8 °C)-99.2 °F (37.3 °C)] 98.7 °F (37.1 °C)  Pulse:  [] 93  Resp:  [18-22] 22  SpO2:  [97 %-100 %] 100 %  BP: (120-150)/(56-85) 150/69     Weight: 81.6 kg (180 lb)  Body mass index is 34.01 kg/m².     Physical Exam  Constitutional:       General: She is not in acute distress.     Appearance: Normal appearance. She is obese. She is not ill-appearing or toxic-appearing.   HENT:      Head: Normocephalic.      Mouth/Throat:      Mouth: Mucous membranes are moist.      Pharynx: Oropharynx is clear.   Eyes:      Extraocular Movements: Extraocular movements intact.       Conjunctiva/sclera: Conjunctivae normal.   Cardiovascular:      Rate and Rhythm: Normal rate and regular rhythm.   Pulmonary:      Effort: Pulmonary effort is normal. No respiratory distress.   Abdominal:      General: Abdomen is flat. There is no distension.      Palpations: Abdomen is soft.      Tenderness: There is no abdominal tenderness. There is no guarding.   Genitourinary:     Comments: Mildly swollen external hemorrhoids, increased sphincter resting tone and internal hemorrhoids moderate in size with irritation but no stigmata of bleeding or ulceration on exam, rectal mucosa pink and healthy. No other perianal pathology.  Musculoskeletal:         General: No swelling.   Skin:     General: Skin is warm and dry.      Coloration: Skin is pale.   Neurological:      General: No focal deficit present.      Mental Status: She is alert.   Psychiatric:         Mood and Affect: Mood normal.         Behavior: Behavior normal.         Thought Content: Thought content normal.         Judgment: Judgment normal.        Significant Labs:  BMP (Last 3 Results):   Recent Labs   Lab 03/18/25  1040   GLU 95      K 4.1      CO2 22*   BUN 10   CREATININE 0.8   CALCIUM 8.8   MG 2.1     CBC (Last 3 Results):   Recent Labs   Lab 03/18/25  1040 03/18/25  1548   WBC 7.47 22.61*   RBC 2.31* 2.54*   HGB 3.8* 4.7*   HCT 15.3* 17.5*   * 695*   MCV 66* 69*   MCH 16.5* 18.5*   MCHC 24.8* 26.9*       Significant Diagnostics:  CT: I have reviewed all pertinent results/findings within the past 24 hours:  negative for active bleed

## 2025-03-19 NOTE — DISCHARGE INSTRUCTIONS
Anal Surgery Post Op Instructions:    You had a hemorrhoidectomy (two column) and a hemorhoidopexy performed today.     Wound care:  Expect some drainage over the next few weeks as the wound heals.  Please wear a pad to help with drainage.   You may have stitches in place that will absorb.  They may break down early and cause an open wound - this is very common and it will continue to heal fine.  There may be foam packing in your anus - once this comes out with a bowel movement or passing gas, flush down the toilet or throw away and do not replace.  Showering is okay starting tomorrow. You can take warm water soaks in your own bathtub (aka sitz baths) to relax tight pelvic muscles and cleanse the area gently after bowel movements. Using a gently bidet, squirt bottle, or shower head can help cleanse the area.  Do not put creams or ointments over the area as it heals.    Medications:  You should take acetaminophen (tylenol) and ibuprofen (motrin) around the clock for the first 24hrs for continued baseline pain control then wean off, but it is safe to continue taking then around the clock for over a week if needed.  For example, take 1000mg tylenol every 6 hours and 600mg motrin every six hours. You can leap-frog these mediations to have continued pain control: tylenol at 9am, motrin at 12pm, tylenol at 3pm, motrin at 6pm, ect. A narcotic pain medication has been prescribed for severe pain.  Please only take this pain medication as needed since this can cause painful constipation and painful bowel movements.    You can resume your home medications.    It is important to take miralax 1 capful THREE TIMES A DAY to reduce discomfort with bowel movements and to prevent constipation associated with narcotic pain medications.  You can add docusate (colace) 200mg twice a day as well. Prune juice or milk of magnesium are also good medications to try if you continue to have constipation. Senna (senakot) can also be added, 2  tabs once a day.    Restrictions:  No swimming or hot tubs until healed.  You have no activity restrictions. Return to work/school when pain is controlled without narcotics and you feel well to do your job/pay attention in class.  No dietary restrictions. Keep well hydrated to have soft bowel movements.    Follow up:  Return to clinic in about 4 weeks for follow up and wound check. Call [phone] for worsening pain, inability to urinate, or fever > 101.  Call or schedule follow up in about 7 days via [employer] if you are not otherwise contacted or see an appointment in the portal.

## 2025-03-19 NOTE — TREATMENT PLAN
CRS Treatment Plan    Possible OR today for hemorrhoidectomy.     Please keep NPO.     Kanwal Yu MD  General Surgery, PGY-5

## 2025-03-19 NOTE — ED NOTES
Pt identifiers Janay Mathis were checked and are correct Pt requested to remain in street clothes   LOC: The patient is awake, alert, aware of environment with an appropriate affect. Oriented x4, speaking appropriately  APPEARANCE: Pt rates all over body aches a 9/10 , in no acute distress, pt is clean and well groomed, clothing properly fastened  SKIN: Skin warm, dry and intact, normal skin turgor, moist mucus membranes  RESPIRATORY: Airway is open and patent, respirations are spontaneous, even and unlabored, normal effort and rate  CARDIAC: Normal rate and rhythm, no peripheral edema noted, capillary refill < 3 seconds, bilateral radial pulses 2+  Pt is on a cardiac monitor and pulse oximetry   ABDOMEN: Soft, nontender, nondistended. Bowel sounds present   NEUROLOGIC: PERRL, facial expression is symmetrical, patient moving all extremities spontaneously, normal sensation in all extremities when touched with a finger.  Follows all commands appropriately  MUSCULOSKELETAL: No obvious deformities.

## 2025-03-19 NOTE — ED NOTES
Received report and assumed care of patient at this time.  Patient is AAOx4 with a calm affect and aware of environment. Airway is open and patent, respirations are spontaneous, normal effort and rate noted. Pt denies chest pain at this time. Skin warm and dry. Movement to all extremities noted. Pt notes 8/10 generalized pain in her muscles.  Bed placed in low position, side rails up x 2, call light is within reach of patient. Explanation of care provided to patient, pt placed on BP, pulse-ox and cardiac monitoring. Awaiting further MD orders and bed assignment, POC continues.

## 2025-03-19 NOTE — PROGRESS NOTES
[facility] - Emergency Dept  [facility] Medicine  Progress Note    Patient Name: Janay Mathis  MRN: [id number]  Patient Class: OP- Observation   Admission Date: 3/18/2025  Length of Stay: 0 days  Attending Physician: Valorie Lanier MD  Primary Care Provider: No primary care provider on file.        Subjective     Principal Problem:GI bleed        HPI:  Janay Mathis is a 44 y.o.F with hx of HTN, GERD, thrombocytopenia who presents to [facility] [place] with reports of BRBPR, shortness of breath with associated chest pain. Patient reports this problem has been occurring for at least a year. Reports that she notices blood in her stool with every bowel movement, last one being yesterday. Of note, patient with recent colonoscopy/endoscopy evaluation in Feb of this year for her symptoms  which was relatively normal aside from internal and external hemorrhoids. Denies fever, chills, chest pain, palpitations, SOB, cough, hematemesis, hematuria, vaginal bleeding, dysuria, headaches, or any other symptoms at this time.     In ED: AF, initially tachycardic (now improved), otherwise VSS. CBC with leukocytosis 7.47 (then elevated to 22.61 - most likely reactionary) and Hgb 3.8 (improved to 4.7 on repeat). CMP unremarkable. BNP 37, HS trop <3. Vaginal screen with few WBC, many bacteria. CXR with no significant intrathoracic abnormality. CTA without evidence of active GI bleeding. S/p 3u pRBCs ordered in ED, morphine, and 80mg PPI inj.     Overview/Hospital Course:  Janay Mathis is a 44 y.o. F who was admitted to  for further evaluation of GI bleed and acute blood loss anemia. Patient with Hgb on 3.8 admission, s/p 3u pRBCs with improvement. CRS consulted, planning for hemorroidectomy today with transfer to their service following.       Interval History: Patient seen and assessed at bedside. Afebrile,  VSS. Patient with some improvement in symptoms this morning. Hgb 7.2 this am s/p 3u pRBCs. Continue to trend Hgb. Planning for  OR with CRS today with transfer to their service following.       Review of Systems   Constitutional:  Negative for appetite change.   Respiratory:  Positive for shortness of breath.    Gastrointestinal:  Positive for anal bleeding, blood in stool and nausea. Negative for abdominal distention, abdominal pain, constipation, diarrhea, rectal pain and vomiting.   Genitourinary:  Negative for hematuria and vaginal bleeding.     Objective:     Vital Signs (Most Recent):  Temp: 98.1 °F (36.7 °C) (03/19/25 1022)  Pulse: 74 (03/19/25 1022)  Resp: 16 (03/19/25 1022)  BP: 127/70 (03/19/25 1022)  SpO2: 98 % (03/19/25 1022) Vital Signs (24h Range):  Temp:  [98.1 °F (36.7 °C)-99.2 °F (37.3 °C)] 98.1 °F (36.7 °C)  Pulse:  [69-93] 74  Resp:  [16-22] 16  SpO2:  [95 %-100 %] 98 %  BP: (114-150)/(56-79) 127/70     Weight: 81.6 kg (180 lb)  Body mass index is 34.01 kg/m².    Intake/Output Summary (Last 24 hours) at 3/19/2025 1049  Last data filed at 3/18/2025 2136  Gross per 24 hour   Intake 939 ml   Output --   Net 939 ml         Physical Exam  Vitals and nursing note reviewed.   Constitutional:       General: She is not in acute distress.     Appearance: She is well-developed.   HENT:      Head: Normocephalic and atraumatic.      Mouth/Throat:      Mouth: Mucous membranes are pale.      Pharynx: No oropharyngeal exudate.   Eyes:      Extraocular Movements: Extraocular movements intact.      Conjunctiva/sclera: Conjunctivae normal.   Cardiovascular:      Rate and Rhythm: Normal rate and regular rhythm.      Heart sounds: Normal heart sounds.   Pulmonary:      Effort: Pulmonary effort is normal. No respiratory distress.      Breath sounds: Normal breath sounds. No wheezing.   Abdominal:      General: Bowel sounds are normal. There is no distension.      Palpations: Abdomen is soft.   Musculoskeletal:         General: No tenderness. Normal range of motion.      Cervical back: Normal range of motion and neck supple.   Lymphadenopathy:       Cervical: No cervical adenopathy.   Skin:     General: Skin is warm and dry.      Findings: No rash.   Neurological:      Mental Status: She is alert and oriented to person, place, and time.      Cranial Nerves: No cranial nerve deficit.      Sensory: No sensory deficit.      Coordination: Coordination normal.   Psychiatric:         Behavior: Behavior normal.         Thought Content: Thought content normal.         Judgment: Judgment normal.               Significant Labs: All pertinent labs within the past 24 hours have been reviewed.    Significant Imaging: I have reviewed all pertinent imaging results/findings within the past 24 hours.      Assessment & Plan  GI bleed  Patient's hemorrhage is due to gastrointestinal bleed, this bleeding is not associated with a medication or a coagulopathy. Patients most recent Hgb, Hct, platelets, and INR are listed below.  Recent Labs     03/18/25  1548 03/18/25 2005 03/19/25  0330   HGB 4.7* 6.7* 7.2*  7.2*  7.2*   HCT 17.5* 23.2* 25.4*  25.4*  25.4*   * 617* 571*  571*  571*     Plan  - patient started on GIB pathway   - Will trend hemoglobin/hematocrit Every 8 hours  - Will monitor and correct any coagulation defects  - check CMP, Lactate, Lipase, PT/PTT  - received 80mg IV Protonix in the ED, continue 40mg IV BID  - obtain 2 large bore IVs, start IVF with NS @100 cc/hr  - consult CRS for acute GIB, potentially rectal bleeding, appreciate recommendations:  - plan for hemorrhoidectomy this afternoon then DC home  - no abx needed  - keep NPO  - transfer to CRS post op  - transfuse blood products as necessary for hgb <7   - use IV anti-emetics as needed (if appropriate with Qtc).   - Will transfuse if Hgb is <7g/dl (<8g/dl in cases of active ACS) or if patient has rapid bleeding leading to hemodynamic instability  Acute blood loss anemia  Anemia is likely due to acute blood loss which was from GI bleed and Iron deficiency. Most recent hemoglobin and  hematocrit are listed below.  Recent Labs     03/18/25  1548 03/18/25 2005 03/19/25  0330   HGB 4.7* 6.7* 7.2*  7.2*  7.2*   HCT 17.5* 23.2* 25.4*  25.4*  25.4*     Plan  - Monitor serial CBC: Every 8 hours  - Transfuse PRBC if patient becomes hemodynamically unstable, symptomatic or H/H drops below 7/21.  - Patient has received 3 units of PRBCs on 3/18  - Patient's anemia is currently improving  - anemia labs pending  Bipolar affective disorder, currently active  - history noted   - patient reports no longer taking any medications for this    B12 deficiency  - patient denies taking home B12 supplement  - B12 levels pending    Essential hypertension  Patient's blood pressure range in the last 24 hours was: BP  Min: 114/61  Max: 150/69.The patient's inpatient anti-hypertensive regimen is listed below:  Current Antihypertensives       Plan  - BP is controlled, no changes needed to their regimen  - on no home antihypertensives, continue to monitor  Gastroesophageal reflux disease without esophagitis  - PPI inj for GI bleed    VTE Risk Mitigation (From admission, onward)           Ordered     IP VTE HIGH RISK PATIENT  Once         03/18/25 1655     Place sequential compression device  Until discontinued         03/18/25 1655     Reason for No Pharmacological VTE Prophylaxis  Once        Question:  Reasons:  Answer:  Active Bleeding    03/18/25 1655                    Discharge Planning   MARTIN: 3/19/2025     Code Status: Full Code   Medical Readiness for Discharge Date: 3/19/2025                           Kinjal Vega PA-C  Department of Hospital Medicine   [facility] - Emergency Dept

## 2025-03-19 NOTE — ASSESSMENT & PLAN NOTE
Patient's blood pressure range in the last 24 hours was: BP  Min: 114/61  Max: 150/69.The patient's inpatient anti-hypertensive regimen is listed below:  Current Antihypertensives       Plan  - BP is controlled, no changes needed to their regimen  - on no home antihypertensives, continue to monitor

## 2025-03-19 NOTE — CONSULTS
[facility] - Emergency Dept  Colorectal Surgery  Consult Note    Patient Name: Janay Mathis  MRN: [id number]  Admission Date: 3/18/2025  Hospital Length of Stay: 0 days  Attending Physician: Valorie Lanier MD  Primary Care Provider: No primary care provider on file.    Inpatient consult to Colorectal Surgery  Consult performed by: Angelica Emmanuel MD  Consult ordered by: Kinjal Vega PA-C        Subjective:     Chief Complaint/Reason for Admission: hematochezia    History of Present Illness:  45yo F with hematochezia. States she has had blood-coated BM since 2022 with every bowel movement up to 3+ xday and sometimes without. Painless, denies prolapse, straining, or prolonged sitting on the toilet. Waits until she is severely short of breath to get evaluated since she has presented to an ED often for hematochezia. Last cscope Feb 2025 unremarkable, UGI unremarkable. Hx of GERD and Hpylori, no family Hx of CRC. She presented to the ED today for SOB, chest pain, orthostasis. Hgb 3.8 and , now HD stable after 2u PRBC and ongoing blood transfusions. CRS and GI consulted.      Review of patient's allergies indicates:   Allergen Reactions    Tramadol Itching and Swelling       History reviewed. No pertinent past medical history.  Past Surgical History:   Procedure Laterality Date    COLONOSCOPY N/A 2/3/2025    Procedure: COLONOSCOPY;  Surgeon: Erasto Reyes MD;  Location: [address]);  Service: Endoscopy;  Laterality: N/A;    ESOPHAGOGASTRODUODENOSCOPY N/A 2/3/2025    Procedure: EGD (ESOPHAGOGASTRODUODENOSCOPY);  Surgeon: Erasto Reyes MD;  Location: [address]);  Service: Endoscopy;  Laterality: N/A;     Family History    None       Tobacco Use    Smoking status: Never    Smokeless tobacco: Never   Substance and Sexual Activity    Alcohol use: Not Currently    Drug use: Yes     Types: Marijuana     Comment: occasionally    Sexual activity: Not on file     Review of Systems    Constitutional:  Negative for appetite change.   Gastrointestinal:  Positive for anal bleeding, blood in stool and nausea. Negative for abdominal distention, abdominal pain, constipation, diarrhea, rectal pain and vomiting.   Genitourinary:  Negative for hematuria and vaginal bleeding.     Objective:     Vital Signs (Most Recent):  Temp: 98.7 °F (37.1 °C) (03/18/25 1916)  Pulse: 93 (03/18/25 1916)  Resp: (!) 22 (03/18/25 1916)  BP: (!) 150/69 (03/18/25 1916)  SpO2: 100 % (03/18/25 1916) Vital Signs (24h Range):  Temp:  [98.3 °F (36.8 °C)-99.2 °F (37.3 °C)] 98.7 °F (37.1 °C)  Pulse:  [] 93  Resp:  [18-22] 22  SpO2:  [97 %-100 %] 100 %  BP: (120-150)/(56-85) 150/69     Weight: 81.6 kg (180 lb)  Body mass index is 34.01 kg/m².     Physical Exam  Constitutional:       General: She is not in acute distress.     Appearance: Normal appearance. She is obese. She is not ill-appearing or toxic-appearing.   HENT:      Head: Normocephalic.      Mouth/Throat:      Mouth: Mucous membranes are moist.      Pharynx: Oropharynx is clear.   Eyes:      Extraocular Movements: Extraocular movements intact.      Conjunctiva/sclera: Conjunctivae normal.   Cardiovascular:      Rate and Rhythm: Normal rate and regular rhythm.   Pulmonary:      Effort: Pulmonary effort is normal. No respiratory distress.   Abdominal:      General: Abdomen is flat. There is no distension.      Palpations: Abdomen is soft.      Tenderness: There is no abdominal tenderness. There is no guarding.   Genitourinary:     Comments: Mildly swollen external hemorrhoids, increased sphincter resting tone and internal hemorrhoids moderate in size with irritation but no stigmata of bleeding or ulceration on exam, rectal mucosa pink and healthy. No other perianal pathology.  Musculoskeletal:         General: No swelling.   Skin:     General: Skin is warm and dry.      Coloration: Skin is pale.   Neurological:      General: No focal deficit present.      Mental  Status: She is alert.   Psychiatric:         Mood and Affect: Mood normal.         Behavior: Behavior normal.         Thought Content: Thought content normal.         Judgment: Judgment normal.        Significant Labs:  BMP (Last 3 Results):   Recent Labs   Lab 03/18/25  1040   GLU 95      K 4.1      CO2 22*   BUN 10   CREATININE 0.8   CALCIUM 8.8   MG 2.1     CBC (Last 3 Results):   Recent Labs   Lab 03/18/25  1040 03/18/25  1548   WBC 7.47 22.61*   RBC 2.31* 2.54*   HGB 3.8* 4.7*   HCT 15.3* 17.5*   * 695*   MCV 66* 69*   MCH 16.5* 18.5*   MCHC 24.8* 26.9*       Significant Diagnostics:  CT: I have reviewed all pertinent results/findings within the past 24 hours:  negative for active bleed    Assessment/Plan:     GI  * GI bleed  43yo F with chronic hematochezia. Likely related to internal hemorrhoids given negative colonoscopy recently and negative CTA. She has not had another bloody BM since yesterday and tachycardia has stabilized. Recommend continued resuscitation by primary team, allow GI to assess the patient and provide recommendations, and we will plan for hemorrhoidectomy, date/time pending. Okay for her to eat tonight.        Thank you for your consult. I will follow-up with patient. Please contact us if you have any additional questions.    Angelica Emmanuel MD  Colorectal Surgery  [facility] - Emergency Dept

## 2025-03-19 NOTE — SUBJECTIVE & OBJECTIVE
Subjective:     Interval History: no furthering hematochezia episodes    Post-Op Info:  Procedure(s) (LRB):  HEMORRHOIDECTOMY (N/A)          Medications:  Continuous Infusions:  Scheduled Meds:   methocarbamoL  500 mg Oral QID    pantoprazole  40 mg Intravenous BID     PRN Meds:   methocarbamoL tablet 500 mg    pantoprazole injection 40 mg        Objective:     Vital Signs (Most Recent):  Temp: 98.3 °F (36.8 °C) (03/19/25 0738)  Pulse: 78 (03/19/25 0738)  Resp: 17 (03/19/25 0738)  BP: 129/76 (03/19/25 0738)  SpO2: 98 % (03/19/25 0738) Vital Signs (24h Range):  Temp:  [98.3 °F (36.8 °C)-99.2 °F (37.3 °C)] 98.3 °F (36.8 °C)  Pulse:  [69-93] 78  Resp:  [16-22] 17  SpO2:  [95 %-100 %] 98 %  BP: (114-150)/(56-79) 129/76     Intake/Output - Last 3 Shifts         03/17 0700  03/18 0659 03/18 0700  03/19 0659 03/19 0700  03/20 0659    Blood  939     Total Intake(mL/kg)  939 (11.5)     Net  +939                General: alert, awake  HEENT: EOMI, no scleral icterus, CN grossly intact  Cardiac: RRR, BP stable, warm well perfused  Pulm: non labored breathing on room air, no audible wheeze  Abdomen: soft, non distended, non TTP, obese  Extremities: moving all four extremities, no pedal edema, RUE with IV infiltration soft without skin changes  Skin: no obvious rashes, incisions clean and intact  Neuro: no obvious deficits  Psych: anxious and frustrated  Anorectal: deferred    Significant Labs:  CBC (Last 3 Results):   Recent Labs   Lab 03/18/25  1548 03/18/25 2005 03/19/25  0330   WBC 22.61* 15.88* 12.73*  12.73*  12.73*   RBC 2.54* 3.13* 3.33*  3.33*  3.33*   HGB 4.7* 6.7* 7.2*  7.2*  7.2*   HCT 17.5* 23.2* 25.4*  25.4*  25.4*   * 617* 571*  571*  571*   MCV 69* 74* 76*  76*  76*   MCH 18.5* 21.4* 21.6*  21.6*  21.6*   MCHC 26.9* 28.9* 28.3*  28.3*  28.3*       Significant Diagnostics:  None

## 2025-03-19 NOTE — SUBJECTIVE & OBJECTIVE
Interval History: Patient seen and assessed at bedside. Afebrile,  VSS. Patient with some improvement in symptoms this morning. Hgb 7.2 this am s/p 3u pRBCs. Continue to trend Hgb. Planning for OR with CRS today with transfer to their service following.       Review of Systems   Constitutional:  Negative for appetite change.   Respiratory:  Positive for shortness of breath.    Gastrointestinal:  Positive for anal bleeding, blood in stool and nausea. Negative for abdominal distention, abdominal pain, constipation, diarrhea, rectal pain and vomiting.   Genitourinary:  Negative for hematuria and vaginal bleeding.     Objective:     Vital Signs (Most Recent):  Temp: 98.1 °F (36.7 °C) (03/19/25 1022)  Pulse: 74 (03/19/25 1022)  Resp: 16 (03/19/25 1022)  BP: 127/70 (03/19/25 1022)  SpO2: 98 % (03/19/25 1022) Vital Signs (24h Range):  Temp:  [98.1 °F (36.7 °C)-99.2 °F (37.3 °C)] 98.1 °F (36.7 °C)  Pulse:  [69-93] 74  Resp:  [16-22] 16  SpO2:  [95 %-100 %] 98 %  BP: (114-150)/(56-79) 127/70     Weight: 81.6 kg (180 lb)  Body mass index is 34.01 kg/m².    Intake/Output Summary (Last 24 hours) at 3/19/2025 1049  Last data filed at 3/18/2025 2136  Gross per 24 hour   Intake 939 ml   Output --   Net 939 ml         Physical Exam  Vitals and nursing note reviewed.   Constitutional:       General: She is not in acute distress.     Appearance: She is well-developed.   HENT:      Head: Normocephalic and atraumatic.      Mouth/Throat:      Mouth: Mucous membranes are pale.      Pharynx: No oropharyngeal exudate.   Eyes:      Extraocular Movements: Extraocular movements intact.      Conjunctiva/sclera: Conjunctivae normal.   Cardiovascular:      Rate and Rhythm: Normal rate and regular rhythm.      Heart sounds: Normal heart sounds.   Pulmonary:      Effort: Pulmonary effort is normal. No respiratory distress.      Breath sounds: Normal breath sounds. No wheezing.   Abdominal:      General: Bowel sounds are normal. There is no  distension.      Palpations: Abdomen is soft.   Musculoskeletal:         General: No tenderness. Normal range of motion.      Cervical back: Normal range of motion and neck supple.   Lymphadenopathy:      Cervical: No cervical adenopathy.   Skin:     General: Skin is warm and dry.      Findings: No rash.   Neurological:      Mental Status: She is alert and oriented to person, place, and time.      Cranial Nerves: No cranial nerve deficit.      Sensory: No sensory deficit.      Coordination: Coordination normal.   Psychiatric:         Behavior: Behavior normal.         Thought Content: Thought content normal.         Judgment: Judgment normal.               Significant Labs: All pertinent labs within the past 24 hours have been reviewed.    Significant Imaging: I have reviewed all pertinent imaging results/findings within the past 24 hours.

## 2025-03-19 NOTE — ED NOTES
Took report from Nida RN and Adilia RN, and assumed care of pt at this time. Pt resting comfortably and independently repositioned in stretcher with bed locked in lowest position for safety. NAD noted at this time. Respirations even and unlabored and visible chest rise noted.  Patient offered bathroom assistance and denies need at this time. Pt instructed to call if assistance is needed. Pt on continuous cardiac, BP, and O2 monitoring. Call light within reach. No needs at this time. Will continue to monitor.

## 2025-03-19 NOTE — OP NOTE
[facility]  Operative Note    Date: 03/19/2025    Pre-Op Diagnosis: Hemorrhoids with bleeding    Post-Op Diagnosis: Same    Patient: Janay Mathis    MRN: [id number]    Procedure(s) Performed: Two-column excisional hemorrhoidectomy, ligation of internal hemorrhoids x 1    Specimen(s): Right posterior hemorrhoid, left lateral hemorrhoid    Staff Surgeon: Josie Salazar MD    Assistant Surgeon: Angelica Emmanuel (fellow)    Anesthesia: General, Exparel    Operative procedure:   The patient was taken to the operating room and SCDs were placed. They were placed under general anesthesia by the anesthesia team and placed into the prone position.  The buttocks were taped apart and the perianal area was prepped with Betadine and draped.  After an appropriate time-out we performed an examination under anesthesia. An anoscope was placed into the rectum and all 4 quadrants were carefully examined.  We performed a pudendal nerve and perianal block with a mixture of 0.25% Marcaine and Exparel.    A Hill-James retractor was placed, and we elected to remove the right posterior column first.  The hemorrhoid tissue was grasped and a narrow incision outlined with electrocautery at the base.  The hemorrhoid complex was dissected off from the external and internal sphincter and ligated with a Ligasure.  The wound was then closed with a locked running chromic suture. The area was carefully examined for hemostasis.     We then examined the left side of the anal canal. We placed a grasper on the left lateral column and created a narrow incision with electrocautery near the base.  The hemorrhoid complex was dissected off of the external and internal sphincter muscle and ligated with a ligasure.  The wound was again closed with a locked running chromic suture. This column was carefully examined for hemostasis.     At this point and there was no significant redundant mucosa remaining.  Because of her significant bleeding we  suture ligated the right anterior column. We were able to easily fit for a medium Hill-James retractor into the anal canal.    A roll of the Gelfoam was placed into the anal canal.  Following this we terminated the procedure.     Sponge needle and instrument counts were correct.  The patient tolerated the procedure well. I was present for the entire procedure procedure. The patient was then extubated and transferred to the recovery room in good condition.    Estimated Blood Loss: 25mL    Drains: None    Wound Class: IV    Josie Salazar

## 2025-03-19 NOTE — HOSPITAL COURSE
Janay Mahtis is a 44 y.o. F who was admitted to  for further evaluation of GI bleed and acute blood loss anemia. Patient with Hgb on 3.8 admission, s/p 3u pRBCs with improvement. CRS consulted, planning for hemorroidectomy today with transfer to their service following.

## 2025-03-19 NOTE — ASSESSMENT & PLAN NOTE
43yo F with chronic hematochezia. Likely related to internal hemorrhoids given negative colonoscopy recently and negative CTA. She has not had another bloody BM since yesterday and tachycardia has stabilized. Recommend continued resuscitation by primary team, allow GI to assess the patient and provide recommendations, and we will plan for hemorrhoidectomy, date/time pending. Okay for her to eat tonight.

## 2025-03-19 NOTE — ASSESSMENT & PLAN NOTE
Anemia is likely due to acute blood loss which was from GI bleed and Iron deficiency. Most recent hemoglobin and hematocrit are listed below.  Recent Labs     03/18/25  1548 03/18/25 2005 03/19/25  0330   HGB 4.7* 6.7* 7.2*  7.2*  7.2*   HCT 17.5* 23.2* 25.4*  25.4*  25.4*     Plan  - Monitor serial CBC: Every 8 hours  - Transfuse PRBC if patient becomes hemodynamically unstable, symptomatic or H/H drops below 7/21.  - Patient has received 3 units of PRBCs on 3/18  - Patient's anemia is currently improving  - anemia labs pending

## 2025-03-20 NOTE — ANESTHESIA POSTPROCEDURE EVALUATION
Anesthesia Post Evaluation    Patient: Janay Mathis    Procedure(s) Performed: Procedure(s) (LRB):  HEMORRHOIDECTOMY (N/A)    Final Anesthesia Type: general      Patient location during evaluation: PACU  Patient participation: Yes- Able to Participate  Level of consciousness: awake and alert  Post-procedure vital signs: reviewed and stable  Pain management: adequate  Airway patency: patent    PONV status at discharge: No PONV  Anesthetic complications: no      Cardiovascular status: blood pressure returned to baseline  Respiratory status: unassisted, spontaneous ventilation and room air  Hydration status: euvolemic  Follow-up not needed.              Vitals Value Taken Time   /75 03/19/25 17:16   Temp 36.1 °C (97 °F) 03/19/25 16:00   Pulse 68 03/19/25 17:29   Resp 16 03/19/25 17:29   SpO2 99 % 03/19/25 17:29   Vitals shown include unfiled device data.      No case tracking events are documented in the log.      Pain/Merary Score: Pain Rating Prior to Med Admin: 10 (3/19/2025  4:29 PM)  Merary Score: 9 (3/19/2025  4:30 PM)

## 2025-03-21 LAB
FINAL PATHOLOGIC DIAGNOSIS: NORMAL
GROSS: NORMAL
Lab: NORMAL

## 2025-03-22 ENCOUNTER — RESULTS FOLLOW-UP (OUTPATIENT)
Dept: SURGERY | Facility: CLINIC | Age: 45
End: 2025-03-22

## 2025-03-22 DIAGNOSIS — K64.9 HEMORRHOIDS, UNSPECIFIED HEMORRHOID TYPE: ICD-10-CM

## 2025-03-22 RX ORDER — OXYCODONE HYDROCHLORIDE 5 MG/1
5 TABLET ORAL EVERY 4 HOURS PRN
Qty: 25 TABLET | Refills: 0 | Status: SHIPPED | OUTPATIENT
Start: 2025-03-22 | End: 2025-03-27 | Stop reason: SDUPTHER

## 2025-03-27 ENCOUNTER — PATIENT MESSAGE (OUTPATIENT)
Dept: SURGERY | Facility: CLINIC | Age: 45
End: 2025-03-27
Payer: MEDICAID

## 2025-03-27 DIAGNOSIS — K64.9 HEMORRHOIDS, UNSPECIFIED HEMORRHOID TYPE: ICD-10-CM

## 2025-03-27 RX ORDER — OXYCODONE HYDROCHLORIDE 5 MG/1
5 TABLET ORAL EVERY 4 HOURS PRN
Qty: 25 TABLET | Refills: 0 | Status: SHIPPED | OUTPATIENT
Start: 2025-03-27 | End: 2025-03-27

## 2025-03-27 RX ORDER — OXYCODONE HYDROCHLORIDE 5 MG/1
5 TABLET ORAL EVERY 4 HOURS PRN
Qty: 25 TABLET | Refills: 0 | Status: SHIPPED | OUTPATIENT
Start: 2025-03-27

## (undated) DEVICE — ELECTRODE MEGADYNE RETURN DUAL

## (undated) DEVICE — PENCIL ROCKER SWITCH 10FT CORD

## (undated) DEVICE — PANTIES FEMININE NAPKIN LG/XLG

## (undated) DEVICE — DRAPE LAP T SHT W/ INSTR PAD

## (undated) DEVICE — ELECTRODE REM PLYHSV RETURN 9

## (undated) DEVICE — TAPE SILK 3IN

## (undated) DEVICE — TRAY MINOR GEN SURG OMC

## (undated) DEVICE — TRAY SKIN SCRUB WET PREMIUM

## (undated) DEVICE — SUT CHROMIC 2-0 SH 27IN BRN

## (undated) DEVICE — BOWL STERILE LARGE 32OZ

## (undated) DEVICE — SYR 10CC LUER LOCK

## (undated) DEVICE — TIP YANKAUERS BULB NO VENT